# Patient Record
Sex: MALE | Race: WHITE | NOT HISPANIC OR LATINO | Employment: FULL TIME | URBAN - METROPOLITAN AREA
[De-identification: names, ages, dates, MRNs, and addresses within clinical notes are randomized per-mention and may not be internally consistent; named-entity substitution may affect disease eponyms.]

---

## 2018-07-30 ENCOUNTER — OFFICE VISIT (OUTPATIENT)
Dept: OBGYN CLINIC | Facility: HOSPITAL | Age: 48
End: 2018-07-30
Payer: COMMERCIAL

## 2018-07-30 VITALS
HEIGHT: 70 IN | HEART RATE: 64 BPM | BODY MASS INDEX: 28.06 KG/M2 | DIASTOLIC BLOOD PRESSURE: 81 MMHG | SYSTOLIC BLOOD PRESSURE: 128 MMHG | WEIGHT: 196 LBS

## 2018-07-30 DIAGNOSIS — M54.50 ACUTE MIDLINE LOW BACK PAIN WITHOUT SCIATICA: Primary | ICD-10-CM

## 2018-07-30 PROCEDURE — 99203 OFFICE O/P NEW LOW 30 MIN: CPT | Performed by: PHYSICIAN ASSISTANT
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
## 2018-07-30 NOTE — PATIENT INSTRUCTIONS
Call or follow up with any new or worsening symptoms as discussed  Ice Pack Application   WHAT YOU NEED TO KNOW:   Ice can be used to decrease swelling and pain after an injury or surgery  Common injuries that may benefit from ice therapy are sprains, strains, and bruises  The use of ice is most effective in the first 1 to 3 days after an injury  DISCHARGE INSTRUCTIONS:   How to apply ice:   · Fill a bag with crushed ice about half full  Remove the air from the bag before you close it  You can also use a bag of frozen vegetables  · Wrap the ice pack in a cloth to protect your skin from frostbite or other injury  · Put the ice over the injured area for 20 to 30 minutes or as long as directed  · Check your skin after about 30 seconds for color changes or blistering  Remove the ice if you notice skin changes or you feel burning or numbness in the area  · Throw the ice pack away after use  · Apply ice to your injured area 4 times each day or as directed  Ask your healthcare provider how many days you should apply ice  Contact your healthcare provider if:   · You see blisters, whitening of your skin, or a bluish color to your skin after using ice  · You feel burning or numbness when using ice  · You have questions about the use of ice packs  © 2017 [employer]  Information is for End User's use only and may not be sold, redistributed or otherwise used for commercial purposes  All illustrations and images included in CareNotes® are the copyrighted property of A [employer] M [employer]  or Brandon Herman  The above information is an  only  It is not intended as medical advice for individual conditions or treatments  Talk to your doctor, nurse or pharmacist before following any medical regimen to see if it is safe and effective for you                Safe Use of NSAIDs   WHAT YOU NEED TO KNOW:   NSAIDs are medicines that are used to decrease pain, swelling, and fever  NSAIDs are available with or without a doctor's order  NSAIDs that you can buy without a doctor's order include aspirin, ibuprofen, and naproxen  DISCHARGE INSTRUCTIONS:   Return to the emergency department if:   · You have swelling around your mouth or trouble breathing  · You are breathing fast or you have a fast heartbeat  · You have nausea, vomiting, or abdominal pain  · You have blood in your vomit or bowel movements  · You have a seizure  Contact your healthcare provider if:   · You have a headache or become confused  · You develop hearing loss or ringing in your ears  · You develop itching, a rash, or hives  · You have swelling around your lower legs, feet, ankles, and hands  · You do not know how much NSAIDs to give to your child  · You have questions or concerns about your condition or care  How to give NSAIDs to your child safely:   · Read the directions on the label  Find out if the medicine is right for your child's age and how much to give to your child  The dose for your child's weight or age should be listed  Do not  give your child more than the recommended amount  · Use the measuring tool that came with the medicine  Do not  use another measuring tool, such as a kitchen spoon  Other measuring tools do not provide the right amount of medicine  How to take NSAIDs safely:   · Read the directions on the label to learn how much medicine you should take and often to take it  Do not take more than the recommended amount  · Talk to your healthcare provider if you need take NSAIDs for more than 30 days  The longer you take NSAIDs, the higher your risk of side effects will be  You may need to take other medicines to decrease your risk of side effects such as stomach bleeding  · Do not take an over-the-counter NSAIDs with prescription NSAIDs  The combined amount of NSAIDs may be too high  · Tell your healthcare provider about other medicines you take  Some medicines can increase the risk of side effects from NSAIDs  Your healthcare provider will tell you if it is okay to take NSAIDs and how to take them  Who should not take NSAIDs:  Certain people should avoid or limit NSAIDs  Do not  give NSAIDs to children under 10months of age without direction from your child's doctor  Do not give aspirin to children under 25years of age  Your child could develop Reye syndrome if he takes aspirin  Reye syndrome can cause life-threatening brain and liver damage  Check your child's medicine labels for aspirin, salicylates, or oil of wintergreen  Talk to your healthcare provider before you take NSAIDs if any of the following apply to you:  · You have reflux disease, a peptic ulcer, H pylori infection, or bleeding in your stomach or intestines  · You have a bleeding disorder, or you take blood-thinning medicine  · You are allergic to aspirin or other NSAIDs  · You have liver or kidney or disease  · You have high blood pressure or heart disease  · You have 3 or more alcoholic drinks each day  · You are pregnant  What you need to know about an NSAID overdose:  Certain health problems can occur if you take too much NSAID medicine at one time or over time  Problems include nausea, vomiting, and abdominal pain  You may develop gastritis, peptic ulcers, and stomach bleeding  You may also develop fluid retention, heart problems, and kidney problems  NSAIDs can worsen high blood pressure  You may become confused, or you may have a headache, hearing loss, or hallucinations  An overdose of aspirin may also cause rapid breathing, a rapid heartbeat, or seizures  What to do if you think you or your child took too much NSAID medicine:  Call the [facility] at [phone] immediately  © 2017 [employer] Information is for End User's use only and may not be sold, redistributed or otherwise used for commercial purposes   All illustrations and images included in CareNotes® are the copyrighted property of [employer] Inc  or Brandon Herman  The above information is an  only  It is not intended as medical advice for individual conditions or treatments  Talk to your doctor, nurse or pharmacist before following any medical regimen to see if it is safe and effective for you

## 2018-08-02 ENCOUNTER — EVALUATION (OUTPATIENT)
Dept: PHYSICAL THERAPY | Age: 48
End: 2018-08-02
Payer: COMMERCIAL

## 2018-08-02 DIAGNOSIS — M54.50 ACUTE MIDLINE LOW BACK PAIN WITHOUT SCIATICA: Primary | ICD-10-CM

## 2018-08-02 PROCEDURE — 97112 NEUROMUSCULAR REEDUCATION: CPT | Performed by: PHYSICAL THERAPIST

## 2018-08-02 PROCEDURE — 97110 THERAPEUTIC EXERCISES: CPT | Performed by: PHYSICAL THERAPIST

## 2018-08-02 PROCEDURE — G8990 OTHER PT/OT CURRENT STATUS: HCPCS | Performed by: PHYSICAL THERAPIST

## 2018-08-02 PROCEDURE — 97161 PT EVAL LOW COMPLEX 20 MIN: CPT | Performed by: PHYSICAL THERAPIST

## 2018-08-02 PROCEDURE — G8991 OTHER PT/OT GOAL STATUS: HCPCS | Performed by: PHYSICAL THERAPIST

## 2018-08-02 NOTE — PROGRESS NOTES
PT Evaluation     Today's date: 2018  Patient name: Flash Nixon  : 1970  MRN: [id number]  Referring provider: Kathy Morris  Dx:   Encounter Diagnosis     ICD-10-CM    1  Acute midline low back pain without sciatica M54 5 Ambulatory referral to Physical Therapy                  Assessment/Plan    Flash Nixon is a 46 y o  male who was referred to physical therapy with complaint of LBP  Symptom report and clinical presentation consistent with hypermobility classification  Primary impairments include poor TVA/multifidus activation, decreased gluteal recruitment/strength, lumbar segmental and gross hypermobility, and report of constant pain that can exacerbate to 8/10 on NPRS  Consequently, patient reports increased difficulty with sit to stand transfers following long periods of inactivity and ability to ambulate first thing in the morning  He would benefit from skilled intervention to address all deficits and improve patient's tolerance to ADLs  He is a good candidate for therapy, as he is motivated to participate and reports no lower extremity referral of sx  Thank you for the referral and please do not hesitate to contact me with any questions or concerns regarding Angelo's care! Plan  2 x week for 6 weeks  Therapeutic exercise/activity, neuromuscular reeducation, manual therapy, and modalities  Patient understands and agrees to plan of care  Goals  Short Term--4 weeks  1  Good TVA/Multifidus activation  2   1/2 point increase in hip MMT  3  Patient will report 2 point decrease in pain levels  Long Term--By Discharge  1  Patient will report that pain is no longer constant  2   Patient will report <2 point increase in pain with morning ADLs  3   Patient will demonstrate 4+/5 hip abduction and 5/5 hip EXT strength with good gluteal activation  4   FOTO score will increase by 5 points       Subjective Evaluation    History of Present Illness  Date of onset: 2018  Mechanism of injury: Patient reports that he started a new job in January where he is required to stand for prolonged periods of time and some heavy lifting is involved as well   6 months ago, he noticed that he would experience pain in his lumbar spine for a day or two and then symptoms would fully resolve for a while  After about three months, he noticed that the pain was consistently occurring after his work shifts when he would be resting in the evening or when he first got up in the morning  Over the past month, pain has been constant throughout the entire day  He saw a specialist, who ordered radiographs that were unremarkable, and referred him to physical therapy to address muscular-related sx  Patient was also prescribed Mobic, which has provided significant relief  Symptoms  Constant midline pain in his lumbar spine and upper buttocks that he describes as a "steady ache "  Pain is a 4/10 at its best and 8/10 at its worst  He is unable to relate any specific activities or movements that provoke or exacerbate symptoms but does notice that pain is worse when he is inactive  He denies any referral into his lower extremities, numbness, paresthesia, or bowel/bladder issues related to the low back pain  Social Support  Steps to enter house: yes  Stairs in house: yes   Lives in: multiple-level home  Lives with: spouse and adult children    Employment status: working    Diagnostic Tests  X-ray: normal        Objective  GAIT: unremarkable  Squat assess: Patient able to squat to parallel, painfree, inc anterior tib translation    SLS: RLE: 30 sec, contralateral hip drop,   LLE: 30 sec, contralateral hip drop      Lumbar  % of normal   Flex  100   Extn  100   SB Left 100   SB Right 100   ROT Left 100   ROT Right 100   Repetitive testing: extension= no change    Flexion= no change      MMT         AROM          PROM    Hip       L       R        L           R      L     R   Flex   4+ 4+ WNL WNL WNL WNL   Extn  4 (inc HS actn) 4 (inc HS act)       Abd  4- 4-       Add  5 5       IR  5 5 WNL WNL WNL WNL   ER  4+ 4+ WNL WNL WNL WNL                 MMT    Knee         L        R   Flex  5 5   Extn  5 5                     MMT    Ankle       L        R   PF (heel raises) 15  15   DF  5 5   EHL 5 5   Inv  5 5   Ever   5 5     Palpation: No tenderness reported with PA spring testing or sidelying segmental mobility  Myotomes (L/R):intact  Dermatome: (pinprick- L/R):  intact       Slump test: L= neg    R=  neg     Straight leg raise:   L= neg     R=  neg            Transverse abdominis: Bent knee fall out= Poor supine march=Poor       Hip/SI joint:   scour= neg      haleigh = neg    capsular mobility=  normal      Active SLR=  neg               Innominate position= L AI        Flowsheet Rows      Most Recent Value   PT/OT G-Codes   Current Score  83   Projected Score  85   FOTO information reviewed  Yes   G code set  Other PT/OT Primary          Precautions: None    Daily Treatment Diary   8/2 patient education re: findings, POC, pathoanatomy  Manual  8/2            L AI correction performed                                                                    Exercise Diary  8/2            Transverse abd HEP; 10x10"            Clamshells; bilat 2x10; 5"                                                                                                                                                                                                                                                          Modalities

## 2018-08-09 ENCOUNTER — OFFICE VISIT (OUTPATIENT)
Dept: PHYSICAL THERAPY | Age: 48
End: 2018-08-09
Payer: COMMERCIAL

## 2018-08-09 DIAGNOSIS — M54.50 ACUTE MIDLINE LOW BACK PAIN WITHOUT SCIATICA: Primary | ICD-10-CM

## 2018-08-09 PROCEDURE — 97110 THERAPEUTIC EXERCISES: CPT | Performed by: PHYSICAL THERAPIST

## 2018-08-09 PROCEDURE — 97112 NEUROMUSCULAR REEDUCATION: CPT | Performed by: PHYSICAL THERAPIST

## 2018-08-09 NOTE — PROGRESS NOTES
Daily Note     Today's date: 2018  Patient name: Hilaria Evans  : 1970  MRN: [id number]  Referring provider: Jean Dixon  Dx:   Encounter Diagnosis     ICD-10-CM    1  Acute midline low back pain without sciatica M54 5                   Subjective: Increased fatigue in his lower back today following long shift at work  Objective: See treatment diary below      Assessment: patient tolerated first treatment well with no aggravation of sx or adverse side effects reported  He was challenged with progression of TVA isometrics today and cueing required in order to properly maintain contraction with addition of dynamic LE movements  Plan: Per POC with progression of stabilization exercises, as tolerated by patient       Daily Treatment Diary    patient education re: findings, POC, pathoanatomy  Manual                       L AI correction performed                                                                                                                           Exercise Diary                     Retro treadmill  4% incline 1 0 MPH           Lateral treadmill  0 5 MPH  3' R, L           Transverse abd HEP; 10x10"  10 x10"  DC home                   Clamshells; bilat 2x10; 5"  2x10 5"  DC home                    TVA w/ ball squeeze    20 x 5"                    TVA w/ hip ABD    20 x 5"; RTB                    TVA w/ bridge    20 x 5"                                                                                                                                                                                                                                                                                                                                                                                                 Modalities

## 2018-08-13 ENCOUNTER — OFFICE VISIT (OUTPATIENT)
Dept: PHYSICAL THERAPY | Age: 48
End: 2018-08-13
Payer: COMMERCIAL

## 2018-08-13 DIAGNOSIS — M54.50 ACUTE MIDLINE LOW BACK PAIN WITHOUT SCIATICA: Primary | ICD-10-CM

## 2018-08-13 PROCEDURE — 97110 THERAPEUTIC EXERCISES: CPT | Performed by: PHYSICAL THERAPIST

## 2018-08-13 PROCEDURE — 97112 NEUROMUSCULAR REEDUCATION: CPT | Performed by: PHYSICAL THERAPIST

## 2018-08-13 NOTE — PROGRESS NOTES
Daily Note     Today's date: 2018  Patient name: Claudio Holt  : 1970  MRN: [id number]  Referring provider: Amy Bhatt  Dx:   Encounter Diagnosis     ICD-10-CM    1  Acute midline low back pain without sciatica M54 5        Subjective: No adverse effects following last session  He reports that TVA isometrics are easier to control with HEP but still has difficulty with bridging exercise  Objective: See treatment diary below      Assessment: Patient able to progress TVA isometrics today with initiation of BKFO and marching  Significant difficulty demonstrated attempting to avoid excessive lumbopelvic movement with these exercises, but he was able to correct with cueing  Gluteal musculature also fatigued quickly with prone hip extension exercise today  Plan: Per POC with progression of stabilization exercises, as tolerated by patient       Daily Treatment Diary    patient education re: findings, POC, pathoanatomy  Manual                       L AI correction performed                                                                                                                           Exercise Diary                   Retro treadmill  4% incline 1 0 MPH 4% inc 1 0 MPH; 5'          Lateral treadmill  0 5 MPH  3' R, L 0 5MPH 3' R,L          Transverse abd HEP; 10x10"  10 x10"  DC home                   Clamshells; bilat 2x10; 5"  2x10 5"  DC home                    TVA w/ ball squeeze    20 x 5"                    TVA w/ hip ABD    20 x 5"; RTB                    TVA w/ bridge    20 x 5"  10 x 5"                  TVA w/ march      20x                  TVA w/ BKFO      20x                  prone hip ext w/ knee bent      15 x 5" R,L                                           fwd/retro monster walks  (length of gym)      2 trips with RTB                  lateral step (length of gym)      2 trips with RTB                                                                                                                                                                                                                                             Modalities

## 2018-08-15 ENCOUNTER — OFFICE VISIT (OUTPATIENT)
Dept: PHYSICAL THERAPY | Age: 48
End: 2018-08-15
Payer: COMMERCIAL

## 2018-08-15 DIAGNOSIS — M54.50 ACUTE MIDLINE LOW BACK PAIN WITHOUT SCIATICA: Primary | ICD-10-CM

## 2018-08-15 PROCEDURE — 97110 THERAPEUTIC EXERCISES: CPT | Performed by: PHYSICAL THERAPIST

## 2018-08-15 PROCEDURE — 97112 NEUROMUSCULAR REEDUCATION: CPT | Performed by: PHYSICAL THERAPIST

## 2018-08-15 NOTE — PROGRESS NOTES
Daily Note     Today's date: 8/15/2018  Patient name: Siria José  : 1970  MRN: [id number]  Referring provider: Bernardo Ruvalcaba PA-C  Dx:   No diagnosis found  Subjective: No adverse effects following last session  He reports minimal pain following today's work shift  Objective: See treatment diary below      Assessment: Patient challenged with addition of hip abduction to bridging exercise today and min cueing required to avoid excessive pelvic rotation  Cueing also provided during [address] to maintain upright trunk  Plan: Per POC with progression of stabilization exercises, as tolerated by patient       Daily Treatment Diary    patient education re: findings, POC, pathoanatomy  Manual                       L AI correction performed                                                                                                                           Exercise Diary  8/2  8/9  8/13  8/15               Retro treadmill  4% incline 1 0 MPH 4% inc 1 0 MPH; 5'     "         Lateral treadmill  0 5 MPH  3' R, L 0 5MPH 3' R,L     "         Transverse abd HEP; 10x10"  10 x10"  DC home                   Clamshells; bilat 2x10; 5"  2x10 5"  DC home                    TVA w/ ball squeeze    20 x 5"                    TVA w/ hip ABD    20 x 5"; RTB                    TVA w/ bridge    20 x 5"  10 x 5"  2 x10 5" with hip ABD GTB                TVA w/ march      20x                  TVA w/ BKFO      20x                  prone hip ext w/ knee bent      15 x 5" R,L                                           fwd/retro monster walks  (length of gym)      2 trips with RTB                  lateral step (length of gym)      2 trips with RTB                  3-way hip kicks        YTB; 3 x10 R,L                [employer]        20# 3 x 20 R,L                                                                                                                                                                                             Modalities

## 2018-08-20 ENCOUNTER — OFFICE VISIT (OUTPATIENT)
Dept: PHYSICAL THERAPY | Age: 48
End: 2018-08-20
Payer: COMMERCIAL

## 2018-08-20 DIAGNOSIS — M54.50 ACUTE MIDLINE LOW BACK PAIN WITHOUT SCIATICA: Primary | ICD-10-CM

## 2018-08-20 PROCEDURE — 97112 NEUROMUSCULAR REEDUCATION: CPT | Performed by: PHYSICAL THERAPIST

## 2018-08-20 NOTE — PROGRESS NOTES
Daily Note     Today's date: 2018  Patient name: Tahmina Ruiz  : 1970  MRN: [id number]  Referring provider: Fernando Sheldon  Dx:   Encounter Diagnosis     ICD-10-CM    1  Acute midline low back pain without sciatica M54 5        Subjective: Patient reports occasional "pinch" in his lower back but pain is significantly improved compared to initial evaluation  Objective: See treatment diary below      Assessment: Patient challenged with progression of TVA activities today  Dead bug exercise was modified to avoid excessive lumbar extension  Min cueing provided during quadruped hip ABD to avoid compensatory hip flexion  With this correction, excursion of motion significantly decreased  Plan: Per POC with progression of stabilization exercises, as tolerated by patient       Daily Treatment Diary    patient education re: findings, POC, pathoanatomy  Manual                       L AI correction performed                                                                                                                           Exercise Diary  8/2  8/9  8/13  8/15  8/20             Retro treadmill  4% incline 1 0 MPH 4% inc 1 0 MPH; 5'     "     "        Lateral treadmill  0 5 MPH  3' R, L 0 5MPH 3' R,L     "      "        Transverse abd HEP; 10x10"  10 x10"  DC home                   Clamshells; bilat 2x10; 5"  2x10 5"  DC home                    TVA w/ ball squeeze    20 x 5"                    TVA w/ hip ABD    20 x 5"; RTB                    TVA w/ bridge    20 x 5"  10 x 5"  2 x10 5" with hip ABD GTB  with march 20 x              TVA w/ march      20x                  TVA w/ BKFO      20x                  prone hip ext w/ knee bent      15 x 5" R,L                                           fwd/retro monster walks  (length of gym)      2 trips with RTB                  lateral step (length of gym)      2 trips with RTB                  3-way hip kicks        YTB; 3 x10 R,L              [employer]        20# 3 x 20 R,L  25# 3 x15 R,L              fire hydrants          15 x R, L 5"              mod dead bug          3 x 5 each                                                                                                                                           Modalities

## 2018-08-22 ENCOUNTER — OFFICE VISIT (OUTPATIENT)
Dept: PHYSICAL THERAPY | Age: 48
End: 2018-08-22
Payer: COMMERCIAL

## 2018-08-22 DIAGNOSIS — M54.50 ACUTE MIDLINE LOW BACK PAIN WITHOUT SCIATICA: Primary | ICD-10-CM

## 2018-08-22 PROCEDURE — 97112 NEUROMUSCULAR REEDUCATION: CPT | Performed by: PHYSICAL THERAPIST

## 2018-08-22 NOTE — PROGRESS NOTES
Daily Note     Today's date: 2018  Patient name: Hilaria Evans  : 1970  MRN: [id number]  Referring provider: Jean Dixon  Dx:   Encounter Diagnosis     ICD-10-CM    1  Acute midline low back pain without sciatica M54 5        Subjective: Patient reports increased discomfort in his R lumbar spine today that was worse after bending forward for an extended period of time  He felt as if he could not "straighten out" for a while after  Objective: See treatment diary below      Assessment: Patient continues to be challenged with progression of core stability exercises and required moderate cueing to avoid excessive pelvic movement with quadruped exercise today  SIJ correction performed today, and patient to monitor effect on workday tomrorow  Plan: Per POC with progression of stabilization exercises, as tolerated by patient       Daily Treatment Diary    patient education re: findings, POC, pathoanatomy  Manual       L AI correction performed            R upslip and L s/l gr 5 4-5 mob                                         Exercise Diary  8/2  8/9  8/13  8/15  8/20  8/22           Retro treadmill  4% incline 1 0 MPH 4% inc 1 0 MPH; 5'     "     "    "       Lateral treadmill  0 5 MPH  3' R, L 0 5MPH 3' R,L     "      "    "       Transverse abd HEP; 10x10"  10 x10"  DC home                   Clamshells; bilat 2x10; 5"  2x10 5"  DC home                    TVA w/ ball squeeze    20 x 5"                    TVA w/ hip ABD    20 x 5"; RTB                    TVA w/ bridge    20 x 5"  10 x 5"  2 x10 5" with hip ABD GTB  with  x  with march 20x            TVA w/ march      20x                  TVA w/ BKFO      20x                  prone hip ext w/ knee bent      15 x 5" R,L                                           fwd/retro monster walks  (length of gym)      2 trips with RTB      retro monster walks  4 trips            lateral step (length of gym)      2 trips with RTB                  3-way hip kicks        YTB; 3 x10 R,L                Pallof Press        20# 3 x 20 R,L  25# 3 x15 R,L  25# 3 x 15 on AIREX            fire hydrants          15 x R, L 5"              mod dead bug          3 x 5 each              quad alt LE            15x ea                                                                                                                 Modalities

## 2018-08-27 ENCOUNTER — OFFICE VISIT (OUTPATIENT)
Dept: PHYSICAL THERAPY | Age: 48
End: 2018-08-27
Payer: COMMERCIAL

## 2018-08-27 DIAGNOSIS — M54.50 ACUTE MIDLINE LOW BACK PAIN WITHOUT SCIATICA: Primary | ICD-10-CM

## 2018-08-27 PROCEDURE — 97112 NEUROMUSCULAR REEDUCATION: CPT | Performed by: PHYSICAL THERAPIST

## 2018-08-27 PROCEDURE — 97110 THERAPEUTIC EXERCISES: CPT | Performed by: PHYSICAL THERAPIST

## 2018-08-27 NOTE — PROGRESS NOTES
Daily Note     Today's date: 2018  Patient name: Ned Greenwood  : 1970  MRN: [id number]  Referring provider: Liyah Gillette  Dx:   Encounter Diagnosis     ICD-10-CM    1  Acute midline low back pain without sciatica M54 5        Subjective: Patient reports that he may continue to experience some discomfort when he lifts heavy objects at work but pain quickly resolves upon completion of the task  Objective: See treatment diary below      Assessment: Trap strengthening initiated today to address poor posture with heavy lifting tasks  Patient noted significant challenge with these exercises  Plan: Per POC with progression of stabilization exercises, as tolerated by patient       Daily Treatment Diary    patient education re: findings, POC, pathoanatomy  Manual       L AI correction performed            R upslip and L s/l gr 5 4-5 mob                                         Exercise Diary  8/2  8/9  8/13  8/15  8/20  8/22  8/27         Retro treadmill  4% incline 1 0 MPH 4% inc 1 0 MPH; 5'     "     "    "   "      Lateral treadmill  0 5 MPH  3' R, L 0 5MPH 3' R,L     "      "    "  3' YTB R,L      Transverse abd HEP; 10x10"  10 x10"  DC home                   Clamshells; bilat 2x10; 5"  2x10 5"  DC home                    TVA w/ ball squeeze    20 x 5"                    TVA w/ hip ABD    20 x 5"; RTB                    TVA w/ bridge    20 x 5"  10 x 5"  2 x10 5" with hip ABD GTB  with march 20 x  with march 20x            TVA w/ march      20x                  TVA w/ BKFO      20x                  prone hip ext w/ knee bent      15 x 5" R,L                                           fwd/retro monster walks  (length of gym)      2 trips with RTB      retro monster walks  4 trips            lateral step (length of gym)      2 trips with RTB                  3-way hip kicks        YTB; 3 x10 R,L                Pallof Press        20# 3 x 20 R,L  25# 3 x15 R,L  25# 3 x 15 on AIREX            fire hydrants          15 x R, L 5"              mod dead bug          3 x 5 each              quad alt LE            15x ea            Chops              20# 3 x 10 R,L          lifts               15# 3 x 10 R, L          Prone Is Ts              2x10 5"                                       Modalities

## 2018-08-29 ENCOUNTER — APPOINTMENT (OUTPATIENT)
Dept: PHYSICAL THERAPY | Age: 48
End: 2018-08-29
Payer: COMMERCIAL

## 2018-09-04 ENCOUNTER — OFFICE VISIT (OUTPATIENT)
Dept: PHYSICAL THERAPY | Age: 48
End: 2018-09-04
Payer: COMMERCIAL

## 2018-09-04 DIAGNOSIS — M54.50 ACUTE MIDLINE LOW BACK PAIN WITHOUT SCIATICA: Primary | ICD-10-CM

## 2018-09-04 PROCEDURE — G8991 OTHER PT/OT GOAL STATUS: HCPCS | Performed by: PHYSICAL THERAPIST

## 2018-09-04 PROCEDURE — 97112 NEUROMUSCULAR REEDUCATION: CPT

## 2018-09-04 PROCEDURE — G8990 OTHER PT/OT CURRENT STATUS: HCPCS | Performed by: PHYSICAL THERAPIST

## 2018-09-04 PROCEDURE — 97110 THERAPEUTIC EXERCISES: CPT

## 2018-09-04 NOTE — PROGRESS NOTES
Daily Note     Today's date: 2018  Patient name: Dana Yoon  : 1970  MRN: [id number]  Referring provider: Jany Garcia  Dx:   Encounter Diagnosis     ICD-10-CM    1  Acute midline low back pain without sciatica M54 5        Subjective: Patient reported pain is getting better but feels like coming here is a good reminder to perform his exercises  Objective: See treatment diary below      Assessment: Strengthening along with education on performing body mechanics  While lifting and performing every day tasks  Time from [facility]  [facility] 445-5pm    Plan: Per POC with progression of stabilization exercises, as tolerated by patient       Daily Treatment Diary    patient education re: findings, POC, pathoanatomy  Manual       L AI correction performed            R upslip and L s/l gr 5 4-5 mob                                         Exercise Diary  8/2  8/9  8/13  8/15  8/20  8/22  8/27  9       Retro treadmill  4% incline 1 0 MPH 4% inc 1 0 MPH; 5'     "     "    "   " 4 incline 1 0 6 min     Lateral treadmill  0 5 MPH  3' R, L 0 5MPH 3' R,L     "      "    "  3' YTB R,L 3 min each side      Transverse abd HEP; 10x10"  10 x10"  DC home            D/C       Clamshells; bilat 2x10; 5"  2x10 5"  DC home            D/C        TVA w/ ball squeeze    20 x 5"            d/C        TVA w/ hip ABD    20 x 5"; RTB                    TVA w/ bridge    20 x 5"  10 x 5"  2 x10 5" with hip ABD GTB  with  x  with march 20x            TVA w/ march      20x                  TVA w/ BKFO      20x                  prone hip ext w/ knee bent      15 x 5" R,L                                           fwd/retro monster walks  (length of gym)      2 trips with RTB      retro monster walks  4 trips            lateral step (length of gym)      2 trips with RTB                  3-way hip kicks        YTB; 3 x10 R,L                Pallof Press        20# 3 x 20 R,L  25# 3 x15 R,L  25# 3 x 15 on AIREX            fire hydrants          15 x R, L 5"              mod dead bug          3 x 5 each              quad alt LE            15x ea            Chops              20# 3 x 10 R,L  15# 3x10        lifts               15# 3 x 10 R, L  20# 3x10        Prone Is Ts              2x10 5"  2x10 5 "                                     Modalities

## 2018-09-12 ENCOUNTER — OFFICE VISIT (OUTPATIENT)
Dept: GASTROENTEROLOGY | Facility: CLINIC | Age: 48
End: 2018-09-12
Payer: COMMERCIAL

## 2018-09-12 ENCOUNTER — APPOINTMENT (OUTPATIENT)
Dept: PHYSICAL THERAPY | Age: 48
End: 2018-09-12
Payer: COMMERCIAL

## 2018-09-12 VITALS
HEART RATE: 82 BPM | HEIGHT: 70 IN | BODY MASS INDEX: 28.2 KG/M2 | DIASTOLIC BLOOD PRESSURE: 82 MMHG | SYSTOLIC BLOOD PRESSURE: 126 MMHG | WEIGHT: 197 LBS | TEMPERATURE: 98.3 F

## 2018-09-12 DIAGNOSIS — R10.13 DYSPEPSIA: Primary | ICD-10-CM

## 2018-09-12 DIAGNOSIS — Z12.11 COLON CANCER SCREENING: ICD-10-CM

## 2018-09-12 PROCEDURE — 99204 OFFICE O/P NEW MOD 45 MIN: CPT | Performed by: INTERNAL MEDICINE

## 2018-09-12 RX ORDER — SUCRALFATE 1 G/1
1 TABLET ORAL 4 TIMES DAILY PRN
Qty: 120 TABLET | Refills: 5 | Status: ON HOLD | OUTPATIENT
Start: 2018-09-12 | End: 2018-10-18 | Stop reason: ALTCHOICE

## 2018-09-12 RX ORDER — MELOXICAM 15 MG/1
15 TABLET ORAL DAILY
Refills: 1 | Status: ON HOLD | COMMUNITY
Start: 2018-08-17 | End: 2018-10-18 | Stop reason: ALTCHOICE

## 2018-09-12 NOTE — LETTER
September 12, 2018     Cris Conway MD  [address]  [place] [address]    Patient: Dana Yoon   YOB: 1970   Date of Visit: 9/12/2018       Dear Dr Pederson Comp:    Thank you for referring Nahomy Farnsworth to me for evaluation  Below are my notes for this consultation  If you have questions, please do not hesitate to call me  I look forward to following your patient along with you  Sincerely,        Mayra Delay, DO        CC: No Recipients  Mayra De Dios, DO  9/12/2018  1:33 PM  Sign at close encounter  [facility] - Outpatient Consultation  Dana Yoon 46 y o  male MRN: [id number]  Encounter: [id number]      ASSESSMENT AND PLAN:    46year old male referred by PCP  1  Dyspepsia- will do EGD given his age and it's new onset, trial of carafate    2  Colon cancer screening- do for colonoscopy so will arrange     3  Night sweats- recent CBC per pt showed mild leukopenia , will try to obtain records from Pt first, he needs a primary doctor, will have my office give recs for a PCP within [facility]     Follow up in a few months time      ______________________________________________________________________    HPI:  46year old male referred by PCP due to dyspepsia for the past few months  Happens in the am   Has the sensation of fullness and discomfort  Denies GERD, nausea, vomiting, dysphagia, or odynophagia, rectal bleeding  Denies family history of colon cancer  Mom had pancreatic cancer diagnosed in her late 63's  Does have some bouts of alternating constipation with then looser stools but denies any diarrhea  REVIEW OF SYSTEMS:    CONSTITUTIONAL: Denies any fever, chills, rigors, and weight loss  HEENT: No earache or tinnitus  Denies hearing loss or visual disturbances  CARDIOVASCULAR: No chest pain or palpitations  RESPIRATORY: Denies any cough, hemoptysis, shortness of breath or dyspnea on exertion    GASTROINTESTINAL: As noted in the History of Present Illness  GENITOURINARY: No problems with urination  Denies any hematuria or dysuria  NEUROLOGIC: No dizziness or vertigo, denies headaches  MUSCULOSKELETAL: Denies any muscle or joint pain  SKIN: Denies skin rashes or itching  ENDOCRINE: Denies excessive thirst  Denies intolerance to heat or cold  PSYCHOSOCIAL: Denies depression or anxiety  Denies any recent memory loss  Historical Information   Past Medical History:   Diagnosis Date    Arthritis      No past surgical history on file  Social History   History   Alcohol use Not on file     History   Drug use: Unknown     History   Smoking Status    Current Every Day Smoker   Smokeless Tobacco    Never Used     Comment: cigars     No family history on file  Meds/Allergies     Current Outpatient Prescriptions:     Cetirizine HCl (ZYRTEC ALLERGY) 10 MG CAPS    meloxicam (MOBIC) 15 mg tablet    No Known Allergies      Objective     Blood pressure 126/82, pulse 82, temperature 98 3 °F (36 8 °C), temperature source Tympanic, height 5' 10" (1 778 m), weight 89 4 kg (197 lb)  Body mass index is 28 27 kg/m²  PHYSICAL EXAM:      General Appearance:   Alert, cooperative, no distress   HEENT:   Normocephalic, atraumatic, anicteric      Neck:  Supple, symmetrical, trachea midline   Lungs:   Clear to auscultation bilaterally; no rales, rhonchi or wheezing; respirations unlabored    Heart[de-identified]   Regular rate and rhythm; no murmur, rub, or gallop  Abdomen:   Soft, non-tender, non-distended; normal bowel sounds; no masses, no organomegaly    Genitalia:   Deferred    Rectal:   Deferred    Extremities:  No cyanosis, clubbing or edema    Pulses:  2+ and symmetric    Skin:  No jaundice, rashes, or lesions    Lymph nodes:  No palpable cervical lymphadenopathy        Lab Results:   No visits with results within 1 Day(s) from this visit  Latest known visit with results is:   No results found for any previous visit         Radiology Results:   No results found

## 2018-09-12 NOTE — PROGRESS NOTES
[facility] - Outpatient Consultation  Heraclio Alcantara 46 y o  male MRN: [id number]  Encounter: [id number]      ASSESSMENT AND PLAN:    46year old male referred by PCP  1  Dyspepsia- will do EGD given his age and it's new onset, trial of carafate    2  Colon cancer screening- do for colonoscopy so will arrange     3  Night sweats- recent CBC per pt showed mild leukopenia , will try to obtain records from Pt first, he needs a primary doctor, will have my office give recs for a PCP within [facility]     Follow up in a few months time      ______________________________________________________________________    HPI:  46year old male referred by PCP due to dyspepsia for the past few months  Happens in the am   Has the sensation of fullness and discomfort  Denies GERD, nausea, vomiting, dysphagia, or odynophagia, rectal bleeding  Denies family history of colon cancer  Mom had pancreatic cancer diagnosed in her late 63's  Does have some bouts of alternating constipation with then looser stools but denies any diarrhea  REVIEW OF SYSTEMS:    CONSTITUTIONAL: Denies any fever, chills, rigors, and weight loss  HEENT: No earache or tinnitus  Denies hearing loss or visual disturbances  CARDIOVASCULAR: No chest pain or palpitations  RESPIRATORY: Denies any cough, hemoptysis, shortness of breath or dyspnea on exertion  GASTROINTESTINAL: As noted in the History of Present Illness  GENITOURINARY: No problems with urination  Denies any hematuria or dysuria  NEUROLOGIC: No dizziness or vertigo, denies headaches  MUSCULOSKELETAL: Denies any muscle or joint pain  SKIN: Denies skin rashes or itching  ENDOCRINE: Denies excessive thirst  Denies intolerance to heat or cold  PSYCHOSOCIAL: Denies depression or anxiety  Denies any recent memory loss         Historical Information   Past Medical History:   Diagnosis Date    Arthritis      No past surgical history on file   Social History   History   Alcohol use Not on file     History   Drug use: Unknown     History   Smoking Status    Current Every Day Smoker   Smokeless Tobacco    Never Used     Comment: cigars     No family history on file  Meds/Allergies     Current Outpatient Prescriptions:     Cetirizine HCl (ZYRTEC ALLERGY) 10 MG CAPS    meloxicam (MOBIC) 15 mg tablet    No Known Allergies      Objective     Blood pressure 126/82, pulse 82, temperature 98 3 °F (36 8 °C), temperature source Tympanic, height 5' 10" (1 778 m), weight 89 4 kg (197 lb)  Body mass index is 28 27 kg/m²  PHYSICAL EXAM:      General Appearance:   Alert, cooperative, no distress   HEENT:   Normocephalic, atraumatic, anicteric      Neck:  Supple, symmetrical, trachea midline   Lungs:   Clear to auscultation bilaterally; no rales, rhonchi or wheezing; respirations unlabored    Heart[de-identified]   Regular rate and rhythm; no murmur, rub, or gallop  Abdomen:   Soft, non-tender, non-distended; normal bowel sounds; no masses, no organomegaly    Genitalia:   Deferred    Rectal:   Deferred    Extremities:  No cyanosis, clubbing or edema    Pulses:  2+ and symmetric    Skin:  No jaundice, rashes, or lesions    Lymph nodes:  No palpable cervical lymphadenopathy        Lab Results:   No visits with results within 1 Day(s) from this visit  Latest known visit with results is:   No results found for any previous visit  Radiology Results:   No results found

## 2018-09-19 ENCOUNTER — APPOINTMENT (OUTPATIENT)
Dept: PHYSICAL THERAPY | Age: 48
End: 2018-09-19
Payer: COMMERCIAL

## 2018-09-26 ENCOUNTER — APPOINTMENT (OUTPATIENT)
Dept: PHYSICAL THERAPY | Age: 48
End: 2018-09-26
Payer: COMMERCIAL

## 2018-10-17 ENCOUNTER — ANESTHESIA EVENT (OUTPATIENT)
Dept: GASTROENTEROLOGY | Facility: MEDICAL CENTER | Age: 48
End: 2018-10-17
Payer: COMMERCIAL

## 2018-10-18 ENCOUNTER — HOSPITAL ENCOUNTER (OUTPATIENT)
Facility: MEDICAL CENTER | Age: 48
Setting detail: OUTPATIENT SURGERY
Discharge: HOME | End: 2018-10-18
Attending: INTERNAL MEDICINE | Admitting: INTERNAL MEDICINE
Payer: COMMERCIAL

## 2018-10-18 ENCOUNTER — ANESTHESIA (OUTPATIENT)
Dept: GASTROENTEROLOGY | Facility: MEDICAL CENTER | Age: 48
End: 2018-10-18
Payer: COMMERCIAL

## 2018-10-18 VITALS
TEMPERATURE: 98.2 F | DIASTOLIC BLOOD PRESSURE: 71 MMHG | RESPIRATION RATE: 16 BRPM | HEART RATE: 61 BPM | SYSTOLIC BLOOD PRESSURE: 111 MMHG | OXYGEN SATURATION: 95 % | WEIGHT: 195 LBS | BODY MASS INDEX: 27.92 KG/M2 | HEIGHT: 70 IN

## 2018-10-18 DIAGNOSIS — R10.13 DYSPEPSIA: ICD-10-CM

## 2018-10-18 DIAGNOSIS — Z12.11 COLON CANCER SCREENING: ICD-10-CM

## 2018-10-18 PROCEDURE — 88342 IMHCHEM/IMCYTCHM 1ST ANTB: CPT | Performed by: PATHOLOGY

## 2018-10-18 PROCEDURE — 88305 TISSUE EXAM BY PATHOLOGIST: CPT | Performed by: PATHOLOGY

## 2018-10-18 PROCEDURE — G0121 COLON CA SCRN NOT HI RSK IND: HCPCS | Performed by: INTERNAL MEDICINE

## 2018-10-18 PROCEDURE — 43239 EGD BIOPSY SINGLE/MULTIPLE: CPT | Performed by: INTERNAL MEDICINE

## 2018-10-18 RX ORDER — SODIUM CHLORIDE 9 MG/ML
125 INJECTION, SOLUTION INTRAVENOUS CONTINUOUS
Status: DISCONTINUED | OUTPATIENT
Start: 2018-10-18 | End: 2018-10-18 | Stop reason: HOSPADM

## 2018-10-18 RX ORDER — PROPOFOL 10 MG/ML
INJECTION, EMULSION INTRAVENOUS AS NEEDED
Status: DISCONTINUED | OUTPATIENT
Start: 2018-10-18 | End: 2018-10-18 | Stop reason: SURG

## 2018-10-18 RX ORDER — LIDOCAINE HYDROCHLORIDE 20 MG/ML
INJECTION, SOLUTION EPIDURAL; INFILTRATION; INTRACAUDAL; PERINEURAL AS NEEDED
Status: DISCONTINUED | OUTPATIENT
Start: 2018-10-18 | End: 2018-10-18 | Stop reason: SURG

## 2018-10-18 RX ADMIN — PROPOFOL 50 MG: 10 INJECTION, EMULSION INTRAVENOUS at 11:05

## 2018-10-18 RX ADMIN — PROPOFOL 30 MG: 10 INJECTION, EMULSION INTRAVENOUS at 11:21

## 2018-10-18 RX ADMIN — PROPOFOL 40 MG: 10 INJECTION, EMULSION INTRAVENOUS at 11:14

## 2018-10-18 RX ADMIN — SODIUM CHLORIDE: 0.9 INJECTION, SOLUTION INTRAVENOUS at 10:50

## 2018-10-18 RX ADMIN — PROPOFOL 50 MG: 10 INJECTION, EMULSION INTRAVENOUS at 11:06

## 2018-10-18 RX ADMIN — PROPOFOL 180 MG: 10 INJECTION, EMULSION INTRAVENOUS at 10:59

## 2018-10-18 RX ADMIN — LIDOCAINE HYDROCHLORIDE 3 ML: 20 INJECTION, SOLUTION EPIDURAL; INFILTRATION; INTRACAUDAL; PERINEURAL at 10:59

## 2018-10-18 RX ADMIN — PROPOFOL 50 MG: 10 INJECTION, EMULSION INTRAVENOUS at 11:09

## 2018-10-18 RX ADMIN — PROPOFOL 30 MG: 10 INJECTION, EMULSION INTRAVENOUS at 11:13

## 2018-10-18 RX ADMIN — PROPOFOL 30 MG: 10 INJECTION, EMULSION INTRAVENOUS at 11:18

## 2018-10-18 RX ADMIN — PROPOFOL 50 MG: 10 INJECTION, EMULSION INTRAVENOUS at 11:01

## 2018-10-18 NOTE — DISCHARGE INSTRUCTIONS
Upper Endoscopy   WHAT YOU NEED TO KNOW:   An upper endoscopy is also called an upper gastrointestinal (GI) endoscopy, or an esophagogastroduodenoscopy (EGD)  You may feel bloated, gassy, or have some abdominal discomfort after your procedure  Your throat may be sore for 24 to 36 hours  You may burp or pass gas from air that is still inside your body  DISCHARGE INSTRUCTIONS:   Call 911 for any of the following:   · You have sudden chest pain or trouble breathing  Seek care immediately if:   · You feel dizzy or faint  · You have trouble swallowing  · Your bowel movements are very dark or black  · Your abdomen is hard and firm and you have severe pain  · You vomit blood  Contact your healthcare provider if:   · You feel full or bloated and cannot burp or pass gas  · You have not had a bowel movement for 3 days after your procedure  · You have neck pain  · You have a fever or chills  · You have nausea or are vomiting  · You have a rash or hives  · You have questions or concerns about your endoscopy  Relieve a sore throat:  Suck on throat lozenges or crushed ice  Gargle with a small amount of warm salt water  Mix 1 teaspoon of salt and 1 cup of warm water to make salt water  Relieve gas and discomfort from bloating:  Lie on your right side with a heating pad on your abdomen  Take short walks to help pass gas  Eat small meals until bloating is relieved  Rest after your procedure: You have been given medicine to relax you  Do not  drive or make important decisions until the day after your procedure  Return to your normal activity as directed  You can usually return to work the day after your procedure  Follow up with your healthcare provider as directed:  Write down your questions so you remember to ask them during your visits     © 2017 [address] is for End User's use only and may not be sold, redistributed or otherwise used for commercial purposes  All illustrations and images included in CareNotes® are the copyrighted property of [employer]  or Brandon Herman  The above information is an  only  It is not intended as medical advice for individual conditions or treatments  Talk to your doctor, nurse or pharmacist before following any medical regimen to see if it is safe and effective for you  Colonoscopy   WHAT YOU NEED TO KNOW:   A colonoscopy is a procedure to examine the inside of your colon (intestine) with a scope  Polyps or tissue growths may have been removed during your colonoscopy  It is normal to feel bloated and to have some abdominal discomfort  You should be passing gas  If you have hemorrhoids or you had polyps removed, you may have a small amount of bleeding  DISCHARGE INSTRUCTIONS:   Seek care immediately if:   · You have a large amount of bright red blood in your bowel movements  · Your abdomen is hard and firm and you have severe pain  · You have sudden trouble breathing  Contact your healthcare provider if:   · You develop a rash or hives  · You have a fever within 24 hours of your procedure       · You have not had a bowel movement for 3 days after your procedure  · You have questions or concerns about your condition or care  Activity:   · Do not lift, strain, or run  for 3 days after your procedure  · Rest after your procedure  You have been given medicine to relax you  Do not  drive or make important decisions until the day after your procedure  Return to your normal activity as directed  · Relieve gas and discomfort from bloating  by lying on your right side with a heating pad on your abdomen  You may need to take short walks to help the gas move out  Eat small meals until bloating is relieved  If you had polyps removed: For 7 days after your procedure:  · Do not  take aspirin  · Do not  go on long car rides    Follow up with your healthcare provider as directed: Write down your questions so you remember to ask them during your visits  © 2017 [address] is for End User's use only and may not be sold, redistributed or otherwise used for commercial purposes  All illustrations and images included in CareNotes® are the copyrighted property of [employer]  or Brandon Herman  The above information is an  only  It is not intended as medical advice for individual conditions or treatments  Talk to your doctor, nurse or pharmacist before following any medical regimen to see if it is safe and effective for you

## 2018-10-18 NOTE — H&P
History and Physical - [facility]  Mary Abraham 46 y o  male MRN: [id number]                  HPI: Mary Abraham is a 46y o  year old male who presents for EGD due to dyspepsia and colonoscopy for colon cancer screening    REVIEW OF SYSTEMS: Per the HPI, and otherwise unremarkable  Historical Information   Past Medical History:   Diagnosis Date    Arthritis      History reviewed  No pertinent surgical history  Social History   History   Alcohol Use    Yes     Comment: socially     History   Drug Use No     History   Smoking Status    Current Every Day Smoker   Smokeless Tobacco    Never Used     Comment: cigars daily     History reviewed  No pertinent family history  Meds/Allergies     Prescriptions Prior to Admission   Medication    Cetirizine HCl (ZYRTEC ALLERGY) 10 MG CAPS    Na Sulfate-K Sulfate-Mg Sulf (SUPREP BOWEL PREP KIT) 17 5-3 13-1 6 GM/180ML SOLN       No Known Allergies    Objective     Blood pressure 139/70, pulse 60, temperature 98 2 °F (36 8 °C), temperature source Temporal, resp  rate 16, height 5' 10" (1 778 m), weight 88 5 kg (195 lb), SpO2 98 %  PHYSICAL EXAM    Gen: NAD  CV: RRR  CHEST: Clear  ABD: soft, NT/ND  EXT: no edema      ASSESSMENT/PLAN:  This is a 46y o  year old male here for EGD and colonoscopy, and he is stable and optimized for his procedure

## 2018-10-18 NOTE — DISCHARGE INSTR - AVS FIRST PAGE
OPERATIVE REPORT  PATIENT NAME: Johnny Dowd    :  1970  MRN: [id number]  Pt Location: [facility] GI ROOM 01    SURGERY DATE: 10/18/2018    Surgeon(s) and Role:     Merle Jean DO - Primary    Preop Diagnosis:  Dyspepsia [R10 13]  Colon cancer screening [Z12 11]    Post-Op Diagnosis Codes: * Dyspepsia [R10 13]     * Colon cancer screening [Z12 11]    Procedure(s) (LRB):  EGD AND COLONOSCOPY (N/A)    Specimen(s):  ID Type Source Tests Collected by Time Destination   1 : Duodenum r/o celiac Normal Tissue Duodenum TISSUE EXAM Saba Amaya, DO 10/18/2018 1102    2 : Stomach r/o h pylori Normal  Tissue Stomach TISSUE EXAM Sam Nielsen, DO 10/18/2018 1104        Estimated Blood Loss:   Minimal    Drains:       Anesthesia Type:   Choice    Operative Indications:  Dyspepsia [R10 13]  Colon cancer screening [Z12 11]      Operative Findings:    ESOPHAGOGASTRODUODENOSCOPY    PROCEDURE: EGD    SEDATION: Monitored anesthesia care, check anesthesia records    ASA Class: 2    INDICATIONS:     CONSENT:  Informed consent was obtained for the procedure, including sedation after explaining the risks and benefits of the procedure  Risks including but not limited to bleeding, perforation, infection, and missed lesion  PREPARATION:   Telemetry, pulse oximetry, blood pressure were monitored throughout the procedure  Patient was identified by myself both verbally and by visual inspection of ID band  DESCRIPTION:   Patient was placed in the left lateral decubitus position and was sedated with the above medication  The gastroscope was introduced in to the oropharynx and the esophagus was intubated under direct visualization  Scope was passed down the esophagus up to 2nd part of the duodenum  A careful inspection was made as the gastroscope was withdrawn, including a retroflexed view of the stomach; findings and interventions are described below  FINDINGS:    #1  Esophagus- normal esophagus    #2   Stomach- normal stomach on direct and retroflexed views, biopsied with cold biopsy forceps    #3  Duodenum- normal 1st and 2nd part of the duodenum, biopsied with cold biopsy forceps         IMPRESSIONS:      Normal EGD  Biopsied from the stomach and duodenum  RECOMMENDATIONS:     Await biopsy results  Proceed with colonoscopy now  COMPLICATIONS:  None; patient tolerated the procedure well  DISPOSITION: PACU           CONDITION: Stable      Colonoscopy Procedure Note    Procedure: Colonoscopy    Sedation: Monitored anesthesia care, check anesthesia records      ASA Class: 2    INDICATIONS:  Colon cancer screening    POST-OP DIAGNOSIS: See the impression below    Procedure Details     Prior colonoscopy: No prior colonoscopy  Informed consent was obtained for the procedure, including sedation  Risks of perforation, hemorrhage, adverse drug reaction and aspiration were discussed  The patient was placed in the left lateral decubitus position  Based on the pre-procedure assessment, including review of the patient's medical history, medications, allergies, and review of systems, he had been deemed to be an appropriate candidate for conscious sedation; he was therefore sedated with the medications listed below  The patient was monitored continuously with telemetry, pulse oximetry, blood pressure monitoring, and direct observations  A rectal examination was performed  The colonoscope was inserted into the rectum and advanced under direct vision to the cecum, which was identified by the ileocecal valve and appendiceal orifice  The quality of the colonic preparation was good  A careful inspection was made as the colonoscope was withdrawn, including a retroflexed view of the rectum; findings and interventions are described below  Findings:  Normal colonoscopy on direct and retroflexed views  Complications: None; patient tolerated the procedure well      Impression:    Normal colonoscopy on direct and retroflexed views  Recommendations:  Repeat colonoscopy in 10 years or sooner if clinically indicated                    SIGNATURE: Rajesh Chaves DO  DATE: October 18, 2018  TIME: 11:26 AM

## 2018-10-18 NOTE — OP NOTE
OPERATIVE REPORT  PATIENT NAME: Marisela Montgomery    :  1970  MRN: [id number]  Pt Location: [facility] GI ROOM 01    SURGERY DATE: 10/18/2018    Surgeon(s) and Role:     Farhad Magallanes DO - Primary    Preop Diagnosis:  Dyspepsia [R10 13]  Colon cancer screening [Z12 11]    Post-Op Diagnosis Codes: * Dyspepsia [R10 13]     * Colon cancer screening [Z12 11]    Procedure(s) (LRB):  EGD AND COLONOSCOPY (N/A)    Specimen(s):  ID Type Source Tests Collected by Time Destination   1 : Duodenum r/o celiac Normal Tissue Duodenum TISSUE EXAM Saba Amaya, DO 10/18/2018 1102    2 : Stomach r/o h pylori Normal  Tissue Stomach TISSUE EXAM Josy Rollins, DO 10/18/2018 1104        Estimated Blood Loss:   Minimal    Drains:       Anesthesia Type:   Choice    Operative Indications:  Dyspepsia [R10 13]  Colon cancer screening [Z12 11]      Operative Findings:    ESOPHAGOGASTRODUODENOSCOPY    PROCEDURE: EGD    SEDATION: Monitored anesthesia care, check anesthesia records    ASA Class: 2    INDICATIONS:     CONSENT:  Informed consent was obtained for the procedure, including sedation after explaining the risks and benefits of the procedure  Risks including but not limited to bleeding, perforation, infection, and missed lesion  PREPARATION:   Telemetry, pulse oximetry, blood pressure were monitored throughout the procedure  Patient was identified by myself both verbally and by visual inspection of ID band  DESCRIPTION:   Patient was placed in the left lateral decubitus position and was sedated with the above medication  The gastroscope was introduced in to the oropharynx and the esophagus was intubated under direct visualization  Scope was passed down the esophagus up to 2nd part of the duodenum  A careful inspection was made as the gastroscope was withdrawn, including a retroflexed view of the stomach; findings and interventions are described below  FINDINGS:    #1  Esophagus- normal esophagus    #2   Stomach- normal stomach on direct and retroflexed views, biopsied with cold biopsy forceps    #3  Duodenum- normal 1st and 2nd part of the duodenum, biopsied with cold biopsy forceps         IMPRESSIONS:      Normal EGD  Biopsied from the stomach and duodenum  RECOMMENDATIONS:     Await biopsy results  Proceed with colonoscopy now  COMPLICATIONS:  None; patient tolerated the procedure well  DISPOSITION: PACU           CONDITION: Stable      Colonoscopy Procedure Note    Procedure: Colonoscopy    Sedation: Monitored anesthesia care, check anesthesia records      ASA Class: 2    INDICATIONS:  Colon cancer screening    POST-OP DIAGNOSIS: See the impression below    Procedure Details     Prior colonoscopy: No prior colonoscopy  Informed consent was obtained for the procedure, including sedation  Risks of perforation, hemorrhage, adverse drug reaction and aspiration were discussed  The patient was placed in the left lateral decubitus position  Based on the pre-procedure assessment, including review of the patient's medical history, medications, allergies, and review of systems, he had been deemed to be an appropriate candidate for conscious sedation; he was therefore sedated with the medications listed below  The patient was monitored continuously with telemetry, pulse oximetry, blood pressure monitoring, and direct observations  A rectal examination was performed  The colonoscope was inserted into the rectum and advanced under direct vision to the cecum, which was identified by the ileocecal valve and appendiceal orifice  The quality of the colonic preparation was good  A careful inspection was made as the colonoscope was withdrawn, including a retroflexed view of the rectum; findings and interventions are described below  Findings:  Normal colonoscopy on direct and retroflexed views  Complications: None; patient tolerated the procedure well      Impression:    Normal colonoscopy on direct and retroflexed views  Recommendations:  Repeat colonoscopy in 10 years or sooner if clinically indicated                    SIGNATURE: Lavonne Anne DO  DATE: October 18, 2018  TIME: 11:26 AM

## 2018-10-18 NOTE — ANESTHESIA PREPROCEDURE EVALUATION
Review of Systems/Medical History  Patient summary reviewed        Cardiovascular  Negative cardio ROS Exercise tolerance (METS): >4,     Pulmonary  Smoker cigarette smoker  , Tobacco cessation counseling given ,        GI/Hepatic    Bowel prep       Negative  ROS        Endo/Other  Negative endo/other ROS      GYN       Hematology  Negative hematology ROS      Musculoskeletal    Arthritis     Neurology  Negative neurology ROS      Psychology   Negative psychology ROS              Physical Exam    Airway    Mallampati score: II  TM Distance: >3 FB  Neck ROM: full     Dental   No notable dental hx     Cardiovascular  Comment: Negative ROS, Rhythm: regular, Rate: normal,     Pulmonary  Breath sounds clear to auscultation,     Other Findings        Anesthesia Plan  ASA Score- 2     Anesthesia Type- IV sedation with anesthesia with ASA Monitors  Additional Monitors:   Airway Plan:         Plan Factors- Patient instructed to abstain from smoking on day of procedure  Patient smoked on day of surgery  Induction- intravenous  Postoperative Plan-     Informed Consent- Anesthetic plan and risks discussed with patient

## 2019-02-13 ENCOUNTER — OFFICE VISIT (OUTPATIENT)
Dept: GASTROENTEROLOGY | Facility: CLINIC | Age: 49
End: 2019-02-13
Payer: COMMERCIAL

## 2019-02-13 VITALS
HEART RATE: 76 BPM | SYSTOLIC BLOOD PRESSURE: 148 MMHG | WEIGHT: 202 LBS | BODY MASS INDEX: 28.92 KG/M2 | HEIGHT: 70 IN | DIASTOLIC BLOOD PRESSURE: 89 MMHG | TEMPERATURE: 98.6 F

## 2019-02-13 DIAGNOSIS — K59.04 CHRONIC IDIOPATHIC CONSTIPATION: Primary | ICD-10-CM

## 2019-02-13 DIAGNOSIS — R14.0 BLOATING: ICD-10-CM

## 2019-02-13 PROCEDURE — 99213 OFFICE O/P EST LOW 20 MIN: CPT | Performed by: PHYSICIAN ASSISTANT

## 2019-02-13 NOTE — PATIENT INSTRUCTIONS
1  Trial Citrucel (fiber supplement)- take daily per bottle instruction  2  Start Miralax - 1 capful daily; can decrease to every other day (or less frequently) or increase to twice a day  3  Maintain high fiber diet  4  Will check thyroid level and follow it up with you    High Fiber Diet   AMBULATORY CARE:   A high-fiber diet  includes foods that have a high amount of fiber  Fiber is the part of fruits, vegetables, and grains that is not broken down by your body  Fiber keeps your bowel movements regular  Fiber can also help lower your cholesterol level, control blood sugar in people with diabetes, and relieve constipation  Fiber can also help you control your weight because it helps you feel full faster  Most adults should eat 25 to 35 grams of fiber each day  Talk to your dietitian or healthcare provider about the amount of fiber you need  Good sources of fiber:   · Foods with at least 4 grams of fiber per serving:      ¨ ? to ½ cup of high-fiber cereal (check the nutrition label on the box)    ¨ ½ cup of blackberries or raspberries    ¨ 4 dried prunes    ¨ 1 cooked artichoke    ¨ ½ cup of cooked legumes, such as lentils, or red, kidney, and gunn beans    · Foods with 1 to 3 grams of fiber per serving:      ¨ 1 slice of whole-wheat, pumpernickel, or rye bread    ¨ ½ cup of cooked brown rice    ¨ 4 whole-wheat crackers    ¨ 1 cup of oatmeal    ¨ ½ cup of cereal with 1 to 3 grams of fiber per serving (check the nutrition label on the box)    ¨ 1 small piece of fruit, such as an apple, banana, pear, kiwi, or orange    ¨ 3 dates    ¨ ½ cup of canned apricots, fruit cocktail, peaches, or pears    ¨ ½ cup of raw or cooked vegetables, such as carrots, cauliflower, cabbage, spinach, squash, or corn  Ways that you can increase fiber in your diet:   · Choose brown or wild rice instead of white rice  · Use whole wheat flour in recipes instead of white or all-purpose flour       · Add beans and peas to casseroles or soups      · Choose fresh fruit and vegetables with peels or skins on instead of juices  Other diet guidelines to follow:   · Add fiber to your diet slowly  You may have abdominal discomfort, bloating, and gas if you add fiber to your diet too quickly  · Drink plenty of liquids as you add fiber to your diet  You may have nausea or develop constipation if you do not drink enough water  Ask how much liquid to drink each day and which liquids are best for you  © 2017 [employer] Information is for End User's use only and may not be sold, redistributed or otherwise used for commercial purposes  All illustrations and images included in CareNotes® are the copyrighted property of [employer]  or Brandon Herman  The above information is an  only  It is not intended as medical advice for individual conditions or treatments  Talk to your doctor, nurse or pharmacist before following any medical regimen to see if it is safe and effective for you

## 2019-02-25 ENCOUNTER — OFFICE VISIT (OUTPATIENT)
Dept: FAMILY MEDICINE CLINIC | Facility: CLINIC | Age: 49
End: 2019-02-25
Payer: COMMERCIAL

## 2019-02-25 VITALS
OXYGEN SATURATION: 99 % | SYSTOLIC BLOOD PRESSURE: 150 MMHG | DIASTOLIC BLOOD PRESSURE: 78 MMHG | RESPIRATION RATE: 16 BRPM | HEART RATE: 66 BPM | BODY MASS INDEX: 29.68 KG/M2 | HEIGHT: 69 IN | WEIGHT: 200.4 LBS

## 2019-02-25 DIAGNOSIS — K59.09 CHRONIC CONSTIPATION: Primary | Chronic | ICD-10-CM

## 2019-02-25 DIAGNOSIS — R53.83 FATIGUE, UNSPECIFIED TYPE: ICD-10-CM

## 2019-02-25 DIAGNOSIS — R14.0 BLOATING: ICD-10-CM

## 2019-02-25 DIAGNOSIS — Z13.1 SCREENING FOR DIABETES MELLITUS: ICD-10-CM

## 2019-02-25 DIAGNOSIS — Z12.5 SCREENING FOR PROSTATE CANCER: ICD-10-CM

## 2019-02-25 PROCEDURE — 99203 OFFICE O/P NEW LOW 30 MIN: CPT | Performed by: FAMILY MEDICINE

## 2019-02-25 PROCEDURE — 3008F BODY MASS INDEX DOCD: CPT | Performed by: FAMILY MEDICINE

## 2019-02-25 NOTE — PROGRESS NOTES
Assessment/Plan:    No problem-specific Assessment & Plan notes found for this encounter  Diagnoses and all orders for this visit:    Chronic constipation  Comments:  Chronic; stable  Followed by GI  Continue high-fiber diet, with good PO hydration; get back to regular exercise  TSH incl with FBW  Orders:  -     TSH, 3rd generation with Free T4 reflex; Future    Bloating    Fatigue, unspecified type  -     CBC and differential; Future  -     TSH, 3rd generation with Free T4 reflex; Future    Screening for diabetes mellitus  Comments:  FBW ordered for the pt  He was also urged to work on stopping smoking  Orders:  -     Lipid Panel with Direct LDL reflex; Future  -     Comprehensive metabolic panel; Future    Screening for prostate cancer  Comments:  PSA incl with FBW  Orders:  -     PSA, Total Screen; Future          Subjective:      Patient ID: Ji Pinedo is a 44 y o  male  Pt referred to our office by GI - pt has issues with constipation chronically  He bloats regularly  Pt just had colonoscopy / EGD in 10/2018  Pt notes only gaining about 5 lbs recently  No real hair loss  The following portions of the patient's history were reviewed and updated as appropriate: allergies, current medications, past family history, past social history, past surgical history and problem list     Past Medical History:   Diagnosis Date    Arthritis      Past Surgical History:   Procedure Laterality Date    COLONOSCOPY      [place] ESOPHAGOGASTRODUODENOSCOPY TRANSORAL DIAGNOSTIC N/A 10/18/2018    Procedure: EGD AND COLONOSCOPY;  Surgeon: Desean Oliver DO;  Location: [facility] GI LAB; Service: Gastroenterology       Current Outpatient Medications:     Cetirizine HCl (ZYRTEC ALLERGY) 10 MG CAPS, Take by mouth, Disp: , Rfl:     No Known Allergies    No family history on file      Social History     Tobacco Use    Smoking status: Light Tobacco Smoker     Packs/day: 0 25    Smokeless tobacco: Never Used   [place] Tobacco comment: cigars daily   Substance Use Topics    Alcohol use: Yes     Frequency: 2-4 times a month     Comment: socially    Drug use: No   ; 2 sons  Pt works in a [employer]  No regular exercise  Review of Systems   Constitutional: Negative for activity change and unexpected weight change  Respiratory: Negative for shortness of breath  Cardiovascular: Negative for chest pain  Gastrointestinal: Positive for constipation  Negative for blood in stool  Endocrine:        No hair loss  Genitourinary: Negative for dysuria and frequency  Stronger odor to his urine  Objective:      /78 (BP Location: Left arm, Patient Position: Sitting, Cuff Size: Standard)   Pulse 66   Resp 16   Ht 5' 9 09" (1 755 m)   Wt 90 9 kg (200 lb 6 4 oz)   SpO2 99%   BMI 29 51 kg/m²   Repeat BP = 146/82 RA seated  Physical Exam   Constitutional: He is oriented to person, place, and time  He appears well-developed and well-nourished  No distress  HENT:   Head: Normocephalic and atraumatic  Right Ear: Hearing, tympanic membrane, external ear and ear canal normal    Left Ear: Hearing, tympanic membrane, external ear and ear canal normal    Mouth/Throat: Oropharynx is clear and moist  No oropharyngeal exudate  Eyes: Conjunctivae are normal    Neck: Normal range of motion  Neck supple  No thyromegaly present  Cardiovascular: Normal rate, regular rhythm and normal heart sounds  Exam reveals no gallop and no friction rub  No murmur heard  Pulmonary/Chest: Effort normal and breath sounds normal  No stridor  No respiratory distress  He has no wheezes  He has no rales  Abdominal: Soft  Bowel sounds are normal  He exhibits no distension and no mass  There is no tenderness  There is no rebound and no guarding  Lymphadenopathy:     He has no cervical adenopathy  Neurological: He is alert and oriented to person, place, and time  Skin: He is not diaphoretic  Psychiatric: He has a normal mood and affect  His behavior is normal  Judgment and thought content normal    Nursing note and vitals reviewed

## 2019-05-22 ENCOUNTER — OFFICE VISIT (OUTPATIENT)
Dept: FAMILY MEDICINE CLINIC | Facility: CLINIC | Age: 49
End: 2019-05-22
Payer: COMMERCIAL

## 2019-05-22 ENCOUNTER — OFFICE VISIT (OUTPATIENT)
Dept: GASTROENTEROLOGY | Facility: CLINIC | Age: 49
End: 2019-05-22
Payer: COMMERCIAL

## 2019-05-22 VITALS
SYSTOLIC BLOOD PRESSURE: 130 MMHG | DIASTOLIC BLOOD PRESSURE: 80 MMHG | OXYGEN SATURATION: 96 % | HEIGHT: 69 IN | BODY MASS INDEX: 28.64 KG/M2 | HEART RATE: 91 BPM | WEIGHT: 193.4 LBS | TEMPERATURE: 98.3 F | RESPIRATION RATE: 15 BRPM

## 2019-05-22 VITALS
WEIGHT: 197.8 LBS | TEMPERATURE: 97.2 F | DIASTOLIC BLOOD PRESSURE: 82 MMHG | HEIGHT: 70 IN | SYSTOLIC BLOOD PRESSURE: 136 MMHG | HEART RATE: 66 BPM | BODY MASS INDEX: 28.32 KG/M2

## 2019-05-22 DIAGNOSIS — G89.29 CHRONIC BILATERAL LOW BACK PAIN WITHOUT SCIATICA: ICD-10-CM

## 2019-05-22 DIAGNOSIS — Z00.00 WELLNESS EXAMINATION: Primary | ICD-10-CM

## 2019-05-22 DIAGNOSIS — E78.5 HYPERLIPIDEMIA, UNSPECIFIED HYPERLIPIDEMIA TYPE: ICD-10-CM

## 2019-05-22 DIAGNOSIS — R10.9 ABDOMINAL DISCOMFORT: ICD-10-CM

## 2019-05-22 DIAGNOSIS — M54.50 CHRONIC BILATERAL LOW BACK PAIN WITHOUT SCIATICA: ICD-10-CM

## 2019-05-22 DIAGNOSIS — K58.1 IRRITABLE BOWEL SYNDROME WITH CONSTIPATION: ICD-10-CM

## 2019-05-22 DIAGNOSIS — K58.1 IRRITABLE BOWEL SYNDROME WITH CONSTIPATION: Primary | ICD-10-CM

## 2019-05-22 DIAGNOSIS — Z23 NEED FOR PROPHYLACTIC VACCINATION WITH COMBINED DIPHTHERIA-TETANUS-PERTUSSIS (DTP) VACCINE: ICD-10-CM

## 2019-05-22 PROCEDURE — 90715 TDAP VACCINE 7 YRS/> IM: CPT

## 2019-05-22 PROCEDURE — 99396 PREV VISIT EST AGE 40-64: CPT | Performed by: FAMILY MEDICINE

## 2019-05-22 PROCEDURE — 90471 IMMUNIZATION ADMIN: CPT

## 2019-05-22 PROCEDURE — 99213 OFFICE O/P EST LOW 20 MIN: CPT | Performed by: PHYSICIAN ASSISTANT

## 2019-05-22 RX ORDER — FLUTICASONE PROPIONATE 50 MCG
SPRAY, SUSPENSION (ML) NASAL AS NEEDED
COMMUNITY

## 2019-05-22 RX ORDER — NAPROXEN 500 MG/1
500 TABLET ORAL 2 TIMES DAILY WITH MEALS
Qty: 60 TABLET | Refills: 1 | Status: SHIPPED | OUTPATIENT
Start: 2019-05-22 | End: 2020-09-29

## 2019-06-25 DIAGNOSIS — R10.13 DYSPEPSIA: ICD-10-CM

## 2019-06-25 DIAGNOSIS — K58.1 IRRITABLE BOWEL SYNDROME WITH CONSTIPATION: ICD-10-CM

## 2019-06-25 DIAGNOSIS — R10.9 ABDOMINAL DISCOMFORT: Primary | ICD-10-CM

## 2019-06-25 DIAGNOSIS — R14.0 BLOATING: ICD-10-CM

## 2019-07-02 ENCOUNTER — TELEPHONE (OUTPATIENT)
Dept: GASTROENTEROLOGY | Facility: CLINIC | Age: 49
End: 2019-07-02

## 2019-07-02 NOTE — TELEPHONE ENCOUNTER
Abdomen Ct approved by the insurance  Spoke Lea S  Insurance rep  She couldn't give me an authorization number (computer difficulty)  Telephone reference # is [id number]  Authorization valid July 1/2019 till August 31/2019

## 2019-07-03 ENCOUNTER — HOSPITAL ENCOUNTER (OUTPATIENT)
Dept: RADIOLOGY | Facility: HOSPITAL | Age: 49
Discharge: HOME | End: 2019-07-03
Payer: COMMERCIAL

## 2019-07-03 DIAGNOSIS — K58.1 IRRITABLE BOWEL SYNDROME WITH CONSTIPATION: ICD-10-CM

## 2019-07-03 DIAGNOSIS — R10.13 DYSPEPSIA: ICD-10-CM

## 2019-07-03 DIAGNOSIS — R14.0 BLOATING: ICD-10-CM

## 2019-07-03 DIAGNOSIS — R10.9 ABDOMINAL DISCOMFORT: ICD-10-CM

## 2019-07-03 PROCEDURE — 74177 CT ABD & PELVIS W/CONTRAST: CPT

## 2019-07-03 RX ADMIN — IOHEXOL 100 ML: 350 INJECTION, SOLUTION INTRAVENOUS at 20:28

## 2019-07-08 ENCOUNTER — TELEPHONE (OUTPATIENT)
Dept: OTHER | Facility: HOSPITAL | Age: 49
End: 2019-07-08

## 2019-08-20 ENCOUNTER — OFFICE VISIT (OUTPATIENT)
Dept: GASTROENTEROLOGY | Facility: CLINIC | Age: 49
End: 2019-08-20
Payer: COMMERCIAL

## 2019-08-20 VITALS
WEIGHT: 198.8 LBS | HEART RATE: 58 BPM | DIASTOLIC BLOOD PRESSURE: 80 MMHG | TEMPERATURE: 97.8 F | BODY MASS INDEX: 29.44 KG/M2 | HEIGHT: 69 IN | SYSTOLIC BLOOD PRESSURE: 129 MMHG

## 2019-08-20 DIAGNOSIS — K58.1 IRRITABLE BOWEL SYNDROME WITH CONSTIPATION: Primary | ICD-10-CM

## 2019-08-20 PROCEDURE — 99213 OFFICE O/P EST LOW 20 MIN: CPT | Performed by: PHYSICIAN ASSISTANT

## 2019-08-20 NOTE — PROGRESS NOTES
[facility] - Outpatient Follow-up Note  Harley Livers 44 y o  male MRN: [id number]  Encounter: [id number]          ASSESSMENT AND PLAN:      1  Irritable bowel syndrome with constipation  Increase Citrucel to twice daily  Start higher dose Linzess 290 mcg daily 30 minutes before breakfast  He can also take MiraLAX with Linzess  Increase fluid intake and physical activity  Continue probiotics and could consider adding IBGard which is a peppermint oil supplement for gas and bloating (explained this is not FDA approved)  If these things are ineffective, would recommend switching to Amitiza twice daily  Follow-up with Dr Brown Anthony in 3 months  ______________________________________________________________________    SUBJECTIVE:  75-year-old male with history of irritable bowel syndrome with constipation presenting for office follow-up  He has been taking Citrucel daily in the morning  He also takes Linzess 145 mcg daily 30 minutes before breakfast   Unfortunately, he still complains of severe bloating and constipation  He has about 2 bowel movements per week  Sometimes he will go 1 full week without moving his bowels  His stool is generally hard  He occasionally has a sticking sensation in his epigastric region  The discomfort is mild  He complains of fullness after eating  He denies nausea or vomiting  He had a normal colonoscopy in 2018  Recent TSH within normal limits  REVIEW OF SYSTEMS IS OTHERWISE NEGATIVE  Historical Information   Past Medical History:   Diagnosis Date    Arthritis      Past Surgical History:   Procedure Laterality Date    COLONOSCOPY      [place] ESOPHAGOGASTRODUODENOSCOPY TRANSORAL DIAGNOSTIC N/A 10/18/2018    Procedure: EGD AND COLONOSCOPY;  Surgeon: Duke Martinez DO;  Location: [facility] GI LAB;   Service: Gastroenterology    UPPER GASTROINTESTINAL ENDOSCOPY       Social History   Social History     Substance and Sexual Activity   Alcohol Use Yes    Frequency: 2-4 times a month    Comment: socially     Social History     Substance and Sexual Activity   Drug Use No     Social History     Tobacco Use   Smoking Status Light Tobacco Smoker    Packs/day: 0 25   Smokeless Tobacco Never Used   Tobacco Comment    cigars daily     Family History   Problem Relation Age of Onset    Pancreatic cancer Mother     Hypertension Mother        Meds/Allergies       Current Outpatient Medications:     Cetirizine HCl (ZYRTEC ALLERGY) 10 MG CAPS    fluticasone (FLONASE ALLERGY RELIEF) 50 mcg/act nasal spray    Linaclotide (LINZESS) 145 MCG CAPS    naproxen (NAPROSYN) 500 mg tablet    No Known Allergies        Objective     Blood pressure 129/80, pulse 58, temperature 97 8 °F (36 6 °C), temperature source Tympanic, height 5' 9 09" (1 755 m), weight 90 2 kg (198 lb 12 8 oz)  Body mass index is 29 28 kg/m²  PHYSICAL EXAM:      General Appearance:   Alert, cooperative, no distress   HEENT:   Normocephalic, atraumatic, anicteric      Neck:  Supple, symmetrical, trachea midline   Lungs:   Clear to auscultation bilaterally    Heart[de-identified]   Regular rate and rhythm; no murmur, rub, or gallop  Abdomen:   Soft, non-tender, non-distended; normal bowel sounds    Genitalia:   Deferred    Rectal:   Deferred    Extremities:  No cyanosis, clubbing or edema    Pulses:  2+ and symmetric    Skin:  No jaundice, rashes, or lesions    Lymph nodes:  No palpable cervical lymphadenopathy        Lab Results:   No visits with results within 1 Day(s) from this visit     Latest known visit with results is:   Admission on 10/18/2018, Discharged on 10/18/2018   Component Date Value    Case Report 10/18/2018                      Value:Surgical Pathology Report                         Case: [id number]                                   Authorizing Provider:  Brinda Quinonez DO        Collected:           10/18/2018 1102              Ordering Location:     [facility]        Received: 10/18/2018 [address]  [facility]  Endoscopy                                                     Pathologist:           Jesus Dinh DO                                                            Specimens:   A) - Duodenum, Duodenum r/o celiac Normal                                                           B) - Stomach, Stomach r/o h pylori Normal                                                  Final Diagnosis 10/18/2018                      Value: This result contains rich text formatting which cannot be displayed here   Additional Information 10/18/2018                      Value: This result contains rich text formatting which cannot be displayed here  Gabriela Cole Gross Description 10/18/2018                      Value: This result contains rich text formatting which cannot be displayed here  Radiology Results:   No results found  Answers for HPI/ROS submitted by the patient on 8/19/2019   Abdominal pain  Chronicity: chronic  Onset: more than 1 year ago  Onset quality: undetermined  Frequency: daily  Episode duration: 1 hours  Progression since onset: unchanged  Pain location: epigastric region, periumbilical region  Pain - numeric: 1/10  Pain quality: dull, a sensation of fullness  Radiates to: does not radiate  anorexia: Yes  arthralgias: Yes  belching: No  constipation: Yes  diarrhea: Yes  dysuria: No  fever: No  flatus: No  frequency: No  headaches: Yes  hematochezia: No  hematuria: No  melena: No  myalgias: Yes  nausea:  No  weight loss: No  vomiting: No  Aggravated by: being still, certain positions  Relieved by: nothing  Diagnostic workup: CT scan, lower endoscopy, upper endoscopy

## 2019-08-20 NOTE — PATIENT INSTRUCTIONS
Increase Citrucel to twice daily  Take 2 capsules of Linzess daily 30 minutes before breakfast for 1-2 weeks  Increase fluids as much as possible  You can add MiraLAX to the Linzess if needed  Can try IBGard for bloating (contains peppermint oil)

## 2019-08-20 NOTE — LETTER
August 20, 2019     Shy Gan, [address]  [address] [place] [address]    Patient: Denise Bell   YOB: 1970   Date of Visit: 8/20/2019       Dear Dr Holly Huerta:    Thank you for referring Jose Cochran to me for evaluation  Below are my notes for this consultation  If you have questions, please do not hesitate to call me  I look forward to following your patient along with you  Sincerely,        Yessica Parker PA-C        CC: No Recipients  Yessica Parker PA-C  8/20/2019  8:38 AM  Sign at close encounter  [facility] - Outpatient Follow-up Note  Denise Bell 44 y o  male MRN: [id number]  Encounter: [id number]          ASSESSMENT AND PLAN:      1  Irritable bowel syndrome with constipation  Increase Citrucel to twice daily  Start higher dose Linzess 290 mcg daily 30 minutes before breakfast  He can also take MiraLAX with Linzess  Increase fluid intake and physical activity  Continue probiotics and could consider adding IBGard which is a peppermint oil supplement for gas and bloating (explained this is not FDA approved)  If these things are ineffective, would recommend switching to Amitiza twice daily  Follow-up with Dr Ned Stewart in 3 months  ______________________________________________________________________    SUBJECTIVE:  49-year-old male with history of irritable bowel syndrome with constipation presenting for office follow-up  He has been taking Citrucel daily in the morning  He also takes Linzess 145 mcg daily 30 minutes before breakfast   Unfortunately, he still complains of severe bloating and constipation  He has about 2 bowel movements per week  Sometimes he will go 1 full week without moving his bowels  His stool is generally hard  He occasionally has a sticking sensation in his epigastric region  The discomfort is mild  He complains of fullness after eating  He denies nausea or vomiting      He had a normal colonoscopy in 2018  Recent TSH within normal limits  REVIEW OF SYSTEMS IS OTHERWISE NEGATIVE  Historical Information   Past Medical History:   Diagnosis Date    Arthritis      Past Surgical History:   Procedure Laterality Date    COLONOSCOPY      CA ESOPHAGOGASTRODUODENOSCOPY TRANSORAL DIAGNOSTIC N/A 10/18/2018    Procedure: EGD AND COLONOSCOPY;  Surgeon: Angela Miller DO;  Location: [facility] GI LAB; Service: Gastroenterology    UPPER GASTROINTESTINAL ENDOSCOPY       Social History   Social History     Substance and Sexual Activity   Alcohol Use Yes    Frequency: 2-4 times a month    Comment: socially     Social History     Substance and Sexual Activity   Drug Use No     Social History     Tobacco Use   Smoking Status Light Tobacco Smoker    Packs/day: 0 25   Smokeless Tobacco Never Used   Tobacco Comment    cigars daily     Family History   Problem Relation Age of Onset    Pancreatic cancer Mother     Hypertension Mother        Meds/Allergies       Current Outpatient Medications:     Cetirizine HCl (ZYRTEC ALLERGY) 10 MG CAPS    fluticasone (FLONASE ALLERGY RELIEF) 50 mcg/act nasal spray    Linaclotide (LINZESS) 145 MCG CAPS    naproxen (NAPROSYN) 500 mg tablet    No Known Allergies        Objective     Blood pressure 129/80, pulse 58, temperature 97 8 °F (36 6 °C), temperature source Tympanic, height 5' 9 09" (1 755 m), weight 90 2 kg (198 lb 12 8 oz)  Body mass index is 29 28 kg/m²  PHYSICAL EXAM:      General Appearance:   Alert, cooperative, no distress   HEENT:   Normocephalic, atraumatic, anicteric      Neck:  Supple, symmetrical, trachea midline   Lungs:   Clear to auscultation bilaterally    Heart[de-identified]   Regular rate and rhythm; no murmur, rub, or gallop     Abdomen:   Soft, non-tender, non-distended; normal bowel sounds    Genitalia:   Deferred    Rectal:   Deferred    Extremities:  No cyanosis, clubbing or edema    Pulses:  2+ and symmetric    Skin:  No jaundice, rashes, or lesions    Lymph nodes:  No palpable cervical lymphadenopathy        Lab Results:   No visits with results within 1 Day(s) from this visit  Latest known visit with results is:   Admission on 10/18/2018, Discharged on 10/18/2018   Component Date Value    Case Report 10/18/2018                      Value:Surgical Pathology Report                         Case: [id number]                                   Authorizing Provider:  Nena Pineda DO        Collected:           10/18/2018 1102              Ordering Location:     [facility] End        Received:            10/18/2018 [address]  [facility]  Endoscopy                                                     Pathologist:           Noy Castillo DO                                                            Specimens:   A) - Duodenum, Duodenum r/o celiac Normal                                                           B) - Stomach, Stomach r/o h pylori Normal                                                  Final Diagnosis 10/18/2018                      Value: This result contains rich text formatting which cannot be displayed here   Additional Information 10/18/2018                      Value: This result contains rich text formatting which cannot be displayed here  [employer] Description 10/18/2018                      Value: This result contains rich text formatting which cannot be displayed here  Radiology Results:   No results found      Answers for HPI/ROS submitted by the patient on 8/19/2019   Abdominal pain  Chronicity: chronic  Onset: more than 1 year ago  Onset quality: undetermined  Frequency: daily  Episode duration: 1 hours  Progression since onset: unchanged  Pain location: epigastric region, periumbilical region  Pain - numeric: 1/10  Pain quality: dull, a sensation of fullness  Radiates to: does not radiate  anorexia: Yes  arthralgias: Yes  belching: No  constipation: Yes  diarrhea: Yes  dysuria: No  fever: No  flatus: No  frequency: No  headaches: Yes  hematochezia: No  hematuria: No  melena: No  myalgias: Yes  nausea:  No  weight loss: No  vomiting: No  Aggravated by: being still, certain positions  Relieved by: nothing  Diagnostic workup: CT scan, lower endoscopy, upper endoscopy

## 2019-09-23 ENCOUNTER — OFFICE VISIT (OUTPATIENT)
Dept: FAMILY MEDICINE CLINIC | Facility: CLINIC | Age: 49
End: 2019-09-23
Payer: COMMERCIAL

## 2019-09-23 VITALS
RESPIRATION RATE: 16 BRPM | SYSTOLIC BLOOD PRESSURE: 142 MMHG | BODY MASS INDEX: 28.14 KG/M2 | HEART RATE: 59 BPM | WEIGHT: 201 LBS | HEIGHT: 71 IN | DIASTOLIC BLOOD PRESSURE: 82 MMHG | OXYGEN SATURATION: 98 % | TEMPERATURE: 98.3 F

## 2019-09-23 DIAGNOSIS — Z12.5 SCREENING FOR PROSTATE CANCER: ICD-10-CM

## 2019-09-23 DIAGNOSIS — G89.29 CHRONIC BILATERAL LOW BACK PAIN WITHOUT SCIATICA: ICD-10-CM

## 2019-09-23 DIAGNOSIS — K58.1 IRRITABLE BOWEL SYNDROME WITH CONSTIPATION: ICD-10-CM

## 2019-09-23 DIAGNOSIS — M54.50 CHRONIC BILATERAL LOW BACK PAIN WITHOUT SCIATICA: ICD-10-CM

## 2019-09-23 DIAGNOSIS — L40.9 SCALP PSORIASIS: ICD-10-CM

## 2019-09-23 DIAGNOSIS — E78.2 MIXED HYPERLIPIDEMIA: Primary | ICD-10-CM

## 2019-09-23 PROCEDURE — 3008F BODY MASS INDEX DOCD: CPT | Performed by: FAMILY MEDICINE

## 2019-09-23 PROCEDURE — 99213 OFFICE O/P EST LOW 20 MIN: CPT | Performed by: FAMILY MEDICINE

## 2019-09-23 RX ORDER — CLOBETASOL PROPIONATE 0.46 MG/ML
SOLUTION TOPICAL DAILY
Qty: 50 ML | Refills: 5 | Status: SHIPPED | OUTPATIENT
Start: 2019-09-23 | End: 2020-11-27

## 2019-09-23 NOTE — PROGRESS NOTES
Assessment/Plan:    No problem-specific Assessment & Plan notes found for this encounter  Diagnoses and all orders for this visit:    Mixed hyperlipidemia  Comments:  Pt stable on exam - overall, this has improved  He is to focus on less fried food / read meat / fast food / butter / cheese in his diet, & regular exercise  Orders:  -     Comprehensive metabolic panel; Future  -     Lipid Panel with Direct LDL reflex; Future    Screening for prostate cancer  Comments:  Repeat FBW in 6 months - PSA will be incl with this  Orders:  -     PSA, Total Screen; Future    Irritable bowel syndrome with constipation  Comments:  Stable; under the care of GI - on Linzess  Scalp psoriasis  Comments: Will try Clobetasol scalp solution  Can use OTC HC Cream to spots on his body  Orders:  -     clobetasol (TEMOVATE) 0 05 % external solution; Apply topically daily    Chronic bilateral low back pain without sciatica  Comments:  Pt was wondering about referral to a back specialist -> had a name, but forgot at this time; he will get back to us with this  Subjective:      Patient ID: Bertha Mcbride is a 44 y o  male  Labs - Gluc 83, Cr/LFTs nml, HDL 77, LDL improved 151  Diet really has not changed; no regular exercise  Working with GI on his IBS - slight improvement with Linzess        The following portions of the patient's history were reviewed and updated as appropriate: allergies, current medications, past family history, past social history, past surgical history and problem list     Past Medical History:   Diagnosis Date    Arthritis        Current Outpatient Medications:     Cetirizine HCl (ZYRTEC ALLERGY) 10 MG CAPS, Take by mouth as needed , Disp: , Rfl:     fluticasone (FLONASE ALLERGY RELIEF) 50 mcg/act nasal spray, , Disp: , Rfl:     Linaclotide (LINZESS) 145 MCG CAPS, Take 1 capsule daily 30 minutes before breakfast , Disp: 90 capsule, Rfl: 2    clobetasol (TEMOVATE) 0 05 % external solution, Apply topically daily, Disp: 50 mL, Rfl: 5    naproxen (NAPROSYN) 500 mg tablet, Take 1 tablet (500 mg total) by mouth 2 (two) times a day with meals as needed  (Patient not taking: Reported on 9/23/2019), Disp: 60 tablet, Rfl: 1    No Known Allergies    Social History     Tobacco Use    Smoking status: Light Tobacco Smoker     Packs/day: 0 25    Smokeless tobacco: Never Used    Tobacco comment: cigars daily   Substance Use Topics    Alcohol use: Yes     Frequency: 2-4 times a month     Comment: socially    Drug use: No         Review of Systems   Constitutional: Negative for activity change  Respiratory: Negative for shortness of breath  Cardiovascular: Negative for chest pain  Gastrointestinal: Negative for abdominal pain  +Chronic bloating  Musculoskeletal: Positive for back pain  Pt with chronic low back pain  Objective:      /82   Pulse 59   Temp 98 3 °F (36 8 °C)   Resp 16   Ht 5' 10 95" (1 802 m)   Wt 91 2 kg (201 lb)   SpO2 98%   BMI 28 08 kg/m²          Physical Exam   Constitutional: He is oriented to person, place, and time  He appears well-developed and well-nourished  No distress  HENT:   Head: Normocephalic and atraumatic  Eyes: Conjunctivae are normal    Neck: Normal range of motion  Neck supple  No thyromegaly present  Cardiovascular: Normal rate, regular rhythm and normal heart sounds  Exam reveals no gallop and no friction rub  No murmur heard  Pulmonary/Chest: Effort normal and breath sounds normal  No stridor  No respiratory distress  He has no wheezes  He has no rales  Lymphadenopathy:     He has no cervical adenopathy  Neurological: He is alert and oriented to person, place, and time  Skin: He is not diaphoretic  Pt with small dry patches to the skin of his scalp and small spots on trunk and legs - possible psoriasis; no signs of secondary infection  Psychiatric: He has a normal mood and affect   His behavior is normal  Judgment and thought content normal    Nursing note and vitals reviewed

## 2019-10-07 DIAGNOSIS — K58.1 IRRITABLE BOWEL SYNDROME WITH CONSTIPATION: Primary | ICD-10-CM

## 2019-11-20 ENCOUNTER — OFFICE VISIT (OUTPATIENT)
Dept: GASTROENTEROLOGY | Facility: CLINIC | Age: 49
End: 2019-11-20
Payer: COMMERCIAL

## 2019-11-20 VITALS
HEIGHT: 71 IN | BODY MASS INDEX: 27.69 KG/M2 | WEIGHT: 197.8 LBS | HEART RATE: 58 BPM | TEMPERATURE: 97.4 F | SYSTOLIC BLOOD PRESSURE: 129 MMHG | DIASTOLIC BLOOD PRESSURE: 78 MMHG

## 2019-11-20 DIAGNOSIS — Z11.59 NEED FOR HEPATITIS C SCREENING TEST: ICD-10-CM

## 2019-11-20 DIAGNOSIS — K58.1 IRRITABLE BOWEL SYNDROME WITH CONSTIPATION: Primary | ICD-10-CM

## 2019-11-20 DIAGNOSIS — Z12.11 COLON CANCER SCREENING: ICD-10-CM

## 2019-11-20 PROCEDURE — 99214 OFFICE O/P EST MOD 30 MIN: CPT | Performed by: INTERNAL MEDICINE

## 2019-11-20 NOTE — PROGRESS NOTES
[facility] - Outpatient Follow-up Note  Trenton Hector 44 y o  male MRN: [id number]  Encounter: [id number]          ASSESSMENT AND PLAN:    Trenton Hector is a 44 y o  male here for a follow up regarding IBS with constipation  1  Irritable bowel syndrome with constipation:  Discontinue use of Linzess and begin taking Amitiza twice daily  continue taking Citrucel 30 minutes before breakfast     2  Colon cancer screening  -uptodate    3  Need for Hep C screening- will discuss at next visit and order    Follow up with PA in 3 months   ______________________________________________________________________    SUBJECTIVE:  Trenton Hector is a 44 y o  male here for a follow up regarding IBS with constipation  Patient was last seen in office on 8/20/19 by a PA, Mu Chacon where patient complained of severe bloating and constipation even with the use of Citrucel and Linzess 145 mcg daily before breakfast  Patient was only having about 0-2 BMs a week, with generally hard stool and a "sticking" sensation in his epigastric region  He also complained of fullness after eating  Patient had a normal colonoscopy in 2018, and his recent TSH was within normal limits at the time  Today patient states that he is still having a BM 0-2 times a week  He increased his dosage of Linzess, and believed it could be working as he was having more loose stools, however, it stopped working after some time and he went back to just having a BM 1-2 times a week  Patient states that he feels as if his stomach feels distended in the morning  He believes that constipation was an issue since he was a young adult, but now that he is older, he feels as if it has gotten worse  Patient states that he was taking MiraLax before but that was making his stool very loose  However, patient is still taking the Citrucel, which is helping his high cholesterol as well  Patient denies any heart burn or acid taste        REVIEW OF SYSTEMS IS OTHERWISE NEGATIVE  Historical Information   Past Medical History:   Diagnosis Date    Arthritis      Past Surgical History:   Procedure Laterality Date    COLONOSCOPY      [place] ESOPHAGOGASTRODUODENOSCOPY TRANSORAL DIAGNOSTIC N/A 10/18/2018    Procedure: EGD AND COLONOSCOPY;  Surgeon: Marvin Richardson DO;  Location: [facility] GI LAB; Service: Gastroenterology    UPPER GASTROINTESTINAL ENDOSCOPY       Social History   Social History     Substance and Sexual Activity   Alcohol Use Yes    Frequency: 2-4 times a month    Comment: socially     Social History     Substance and Sexual Activity   Drug Use No     Social History     Tobacco Use   Smoking Status Light Tobacco Smoker    Packs/day: 0 25   Smokeless Tobacco Never Used   Tobacco Comment    cigars daily     Family History   Problem Relation Age of Onset    Pancreatic cancer Mother     Hypertension Mother        Meds/Allergies       Current Outpatient Medications:     Cetirizine HCl (ZYRTEC ALLERGY) 10 MG CAPS    clobetasol (TEMOVATE) 0 05 % external solution    fluticasone (FLONASE ALLERGY RELIEF) 50 mcg/act nasal spray    linaCLOtide (LINZESS) 290 MCG CAPS    naproxen (NAPROSYN) 500 mg tablet    No Known Allergies        Objective     Blood pressure 129/78, pulse 58, temperature (!) 97 4 °F (36 3 °C), temperature source Tympanic, height 5' 10 95" (1 802 m), weight 89 7 kg (197 lb 12 8 oz)  Body mass index is 27 63 kg/m²  PHYSICAL EXAM:      General Appearance:   Alert, cooperative, no distress   HEENT:   Normocephalic, atraumatic, anicteric      Neck:  Supple, symmetrical, trachea midline   Lungs:   Clear to auscultation bilaterally; no rales, rhonchi or wheezing; respirations unlabored    Heart[de-identified]   Regular rate and rhythm; no murmur, rub, or gallop     Abdomen:   Soft, non-tender, non-distended; normal bowel sounds; no masses, no organomegaly    Genitalia:   Deferred    Rectal:   Deferred    Extremities:  No cyanosis, clubbing or edema    Pulses:  2+ and symmetric    Skin:  No jaundice, rashes, or lesions    Lymph nodes:  No palpable cervical lymphadenopathy        Lab Results:   No visits with results within 1 Day(s) from this visit  Latest known visit with results is:   Admission on 10/18/2018, Discharged on 10/18/2018   Component Date Value    Case Report 10/18/2018                      Value:Surgical Pathology Report                         Case: [id number]                                   Authorizing Provider:  Cynthia Latham DO        Collected:           10/18/2018 1102              Ordering Location:     [facility] End        Received:            10/18/2018 [address]  [facility]  Endoscopy                                                     Pathologist:           Linda Hawkins DO                                                            Specimens:   A) - Duodenum, Duodenum r/o celiac Normal                                                           B) - Stomach, Stomach r/o h pylori Normal                                                  Final Diagnosis 10/18/2018                      Value: This result contains rich text formatting which cannot be displayed here   Additional Information 10/18/2018                      Value: This result contains rich text formatting which cannot be displayed here  [employer] Gross Description 10/18/2018                      Value: This result contains rich text formatting which cannot be displayed here  Radiology Results:   No results found  Attestation:   By signing my name below, ILeonel, attest that this documentation has    been prepared under the direction and in the presence of GRAHAM Young  Electronically Signed: Eladia Smith  11/20/19        Cynthia URIZ, personally performed the services described in this    documentation   All medical record entries made by the scribjose were at my    direction and in my presence  I have reviewed the chart and discharge    instructions and agree that the record reflects my personal performance and is    accurate and complete   GRAHAM Whitfield  11/20/19

## 2019-12-11 ENCOUNTER — OFFICE VISIT (OUTPATIENT)
Dept: PODIATRY | Facility: CLINIC | Age: 49
End: 2019-12-11
Payer: COMMERCIAL

## 2019-12-11 VITALS
DIASTOLIC BLOOD PRESSURE: 75 MMHG | HEART RATE: 60 BPM | HEIGHT: 70 IN | SYSTOLIC BLOOD PRESSURE: 130 MMHG | WEIGHT: 197 LBS | BODY MASS INDEX: 28.2 KG/M2

## 2019-12-11 DIAGNOSIS — B35.3 TINEA PEDIS OF BOTH FEET: ICD-10-CM

## 2019-12-11 DIAGNOSIS — B35.1 ONYCHOMYCOSIS: Primary | ICD-10-CM

## 2019-12-11 DIAGNOSIS — L40.9 PSORIASIS: ICD-10-CM

## 2019-12-11 PROCEDURE — 99243 OFF/OP CNSLTJ NEW/EST LOW 30: CPT | Performed by: PODIATRIST

## 2019-12-11 RX ORDER — CLOTRIMAZOLE 1 %
CREAM (GRAM) TOPICAL 2 TIMES DAILY
Qty: 30 G | Refills: 0 | Status: SHIPPED | OUTPATIENT
Start: 2019-12-11 | End: 2021-06-02 | Stop reason: ALTCHOICE

## 2019-12-11 RX ORDER — TERBINAFINE HYDROCHLORIDE 250 MG/1
250 TABLET ORAL DAILY
Qty: 84 TABLET | Refills: 0 | Status: SHIPPED | OUTPATIENT
Start: 2019-12-11 | End: 2020-03-04

## 2019-12-11 NOTE — PROGRESS NOTES
Assessment/Plan:    Explained to patient that the differential for his rash on the soles of the feet includes chronic tinea pedis; psoriasis and eczema  His left great toenail appears mycotic  It is more cosmetically displeased and than painful  Reviewed recent lab work and liver enzymes were within normal limits  Discussed treatment options  Recommended 1st treating as chronic tinea  Patient was placed on Lamisil 250 mg orally for 84 days  This should treat both the tinea infection and the mycotic toenail  A prescription for clotrimazole cream was also given to the patient for b i d  Application  Patient was advised to contact [facility] dermatology for an appointment if he appears to be dealing with either psoriasis or eczema  No problem-specific Assessment & Plan notes found for this encounter  Diagnoses and all orders for this visit:    Onychomycosis  -     terbinafine (LamISIL) 250 mg tablet; Take 1 tablet (250 mg total) by mouth daily    Tinea pedis of both feet  -     clotrimazole (LOTRIMIN) 1 % cream; Apply topically 2 (two) times a day    Psoriasis          Subjective:      Patient ID: Lamine Giles is a 44 y o  male  HPI     Patient presents with concern regarding the soles of his feet  For at least 6 months he has been dealing with dry scaly skin on the bottom of each foot that is prone to cracking and fissuring  They are not fissured today but they remain dry and scaly  He relates a history of athlete's foot  On questioning, it is noted that there is patches of dry skin on his legs as well as his abdomen and scalp  He usually uses Temovate cream when these areas flare  He notes that he had a comprehensive metabolic profile performed in September results of which were within normal limits      The following portions of the patient's history were reviewed and updated as appropriate: allergies, current medications, past family history, past medical history, past social history, past surgical history and problem list     Review of Systems   Gastrointestinal: Negative  Musculoskeletal: Positive for back pain  Skin: Positive for rash  Objective:      /75   Pulse 60   Ht 5' 10" (1 778 m) Comment: verbal  Wt 89 4 kg (197 lb)   BMI 28 27 kg/m²          Physical Exam   Constitutional: He is oriented to person, place, and time  Cardiovascular: Regular rhythm and intact distal pulses  Musculoskeletal: Normal range of motion  He exhibits no tenderness or deformity  Neurological: He is alert and oriented to person, place, and time  No sensory deficit  He exhibits normal muscle tone  Skin: Rash noted  Soles of both feet dry and scaly  Healed fissures noted within each arch    Left hallux nail plate is thickened yellow with subungual debris

## 2020-01-09 ENCOUNTER — OFFICE VISIT (OUTPATIENT)
Dept: PODIATRY | Facility: CLINIC | Age: 50
End: 2020-01-09
Payer: COMMERCIAL

## 2020-01-09 VITALS
HEIGHT: 70 IN | HEART RATE: 63 BPM | SYSTOLIC BLOOD PRESSURE: 133 MMHG | BODY MASS INDEX: 28.92 KG/M2 | WEIGHT: 202 LBS | DIASTOLIC BLOOD PRESSURE: 77 MMHG

## 2020-01-09 DIAGNOSIS — B35.1 ONYCHOMYCOSIS: Primary | ICD-10-CM

## 2020-01-09 DIAGNOSIS — B35.3 TINEA PEDIS OF BOTH FEET: ICD-10-CM

## 2020-01-09 DIAGNOSIS — L40.9 PSORIASIS: ICD-10-CM

## 2020-01-09 PROCEDURE — 99213 OFFICE O/P EST LOW 20 MIN: CPT | Performed by: PODIATRIST

## 2020-01-09 RX ORDER — CLOTRIMAZOLE 1 %
CREAM (GRAM) TOPICAL 2 TIMES DAILY
Qty: 30 G | Refills: 0 | Status: SHIPPED | OUTPATIENT
Start: 2020-01-09 | End: 2020-02-10

## 2020-01-09 NOTE — PROGRESS NOTES
Assessment/Plan:    Patient advised to can please his Lamisil prescription  Another 30 g tube of clotrimazole for b i d  Application prescribed  Patient to call dermatologist for appointment as I am concerned that he may have psoriasis  He is rescheduled for 6 months  No problem-specific Assessment & Plan notes found for this encounter  Diagnoses and all orders for this visit:    Onychomycosis    Tinea pedis of both feet  -     clotrimazole (LOTRIMIN) 1 % cream; Apply topically 2 (two) times a day    Psoriasis          Subjective:      Patient ID: Braxton Reynaga is a 44 y o  male  HPI     Patient presents for follow-up an assessment  Improvement noted in the soles of both feet with decrease in scaling  Left great toenail even appears improved  Patient still has rash on chest and body  The following portions of the patient's history were reviewed and updated as appropriate: allergies, current medications, past family history, past medical history, past social history, past surgical history and problem list     Review of Systems   Constitutional: Negative  Respiratory: Negative  Cardiovascular: Negative  Gastrointestinal: Negative  Skin: Positive for rash  Objective:      /77   Pulse 63   Ht 5' 10" (1 778 m)   Wt 91 6 kg (202 lb)   BMI 28 98 kg/m²          Physical Exam   Cardiovascular: Regular rhythm and intact distal pulses  Musculoskeletal: Normal range of motion  He exhibits no deformity  Skin: Rash noted  Yellow scales on the plantar soles of both feet  Left great toenail thickened yellow with subungual debris distally

## 2020-01-17 ENCOUNTER — OFFICE VISIT (OUTPATIENT)
Dept: URGENT CARE | Age: 50
End: 2020-01-17
Payer: COMMERCIAL

## 2020-01-17 VITALS
HEART RATE: 102 BPM | OXYGEN SATURATION: 98 % | TEMPERATURE: 99.5 F | RESPIRATION RATE: 18 BRPM | DIASTOLIC BLOOD PRESSURE: 76 MMHG | SYSTOLIC BLOOD PRESSURE: 129 MMHG

## 2020-01-17 DIAGNOSIS — J11.1 INFLUENZA-LIKE ILLNESS: Primary | ICD-10-CM

## 2020-01-17 DIAGNOSIS — M67.442 GANGLION OF LEFT HAND: ICD-10-CM

## 2020-01-17 PROCEDURE — 99213 OFFICE O/P EST LOW 20 MIN: CPT | Performed by: FAMILY MEDICINE

## 2020-01-17 RX ORDER — OSELTAMIVIR PHOSPHATE 75 MG/1
75 CAPSULE ORAL EVERY 12 HOURS SCHEDULED
Qty: 10 CAPSULE | Refills: 0 | Status: SHIPPED | OUTPATIENT
Start: 2020-01-17 | End: 2020-01-22

## 2020-01-17 NOTE — PATIENT INSTRUCTIONS
Rest, limit activity  Tamiflu twice a day until finished (10 doses)  Tylenol, or ibuprofen (Advil/Motrin), or try Aleve (please take with food) as needed  Sinus medication as needed (Flonase nasal spray 1 spray in each nostril once or twice a day)  Recheck/follow-up with family physician and Orthopedics ([phone]) as discussed  Please go to the hospital emergency department if needed

## 2020-01-17 NOTE — PROGRESS NOTES
[facility]        NAME: Steph Gonzales is a 44 y o  male  : 1970    MRN: [id number]  DATE: 2020  TIME: 12:39 PM    Assessment and Plan   Influenza-like illness [R69]  1  Influenza-like illness  oseltamivir (TAMIFLU) 75 mg capsule   2  Ganglion of left hand           Patient Instructions     Patient Instructions   Rest, limit activity  Tamiflu twice a day until finished (10 doses)  Tylenol, or ibuprofen (Advil/Motrin), or try Aleve (please take with food) as needed  Sinus medication as needed (Flonase nasal spray 1 spray in each nostril once or twice a day)  Recheck/follow-up with family physician and Orthopedics ([phone]) as discussed  Please go to the hospital emergency department if needed  Follow up with PCP/Orthopedics in 3-5 days  Proceed to  ER if symptoms worsen  Chief Complaint     Chief Complaint   Patient presents with    Fever      and bodyaches this AM, denies cough , pressure between eyes    with use of motrin prn  History of Present Illness       Sudden onset of fever, body aches, and sinus pressure starting this morning; patient states he did not get the flu vaccine this year; patient also has a ganglion dorsal aspect of the left hand (patient is right-hand dominant)      Review of Systems   Review of Systems   Constitutional: Positive for fever  HENT: Positive for sinus pressure  Respiratory: Negative  Cardiovascular: Negative  Musculoskeletal: Positive for myalgias  Ganglion present dorsal aspect of the left hand   Skin: Negative            Current Medications       Current Outpatient Medications:     Cetirizine HCl (ZYRTEC ALLERGY) 10 MG CAPS, Take by mouth as needed , Disp: , Rfl:     clobetasol (TEMOVATE) 0 05 % external solution, Apply topically daily, Disp: 50 mL, Rfl: 5    clotrimazole (LOTRIMIN) 1 % cream, Apply topically 2 (two) times a day, Disp: 30 g, Rfl: 0    clotrimazole (LOTRIMIN) 1 % cream, Apply topically 2 (two) times a day, Disp: 30 g, Rfl: 0    fluticasone (FLONASE ALLERGY RELIEF) 50 mcg/act nasal spray, as needed , Disp: , Rfl:     linaCLOtide (LINZESS) 290 MCG CAPS, Take 1 capsule 30 minutes before breakfast daily  (Patient not taking: Reported on 12/11/2019), Disp: 90 capsule, Rfl: 2    lubiprostone (AMITIZA) 24 mcg capsule, Take 1 capsule (24 mcg total) by mouth 2 (two) times a day with meals, Disp: 60 capsule, Rfl: 3    naproxen (NAPROSYN) 500 mg tablet, Take 1 tablet (500 mg total) by mouth 2 (two) times a day with meals as needed  (Patient not taking: Reported on 1/9/2020), Disp: 60 tablet, Rfl: 1    oseltamivir (TAMIFLU) 75 mg capsule, Take 1 capsule (75 mg total) by mouth every 12 (twelve) hours for 10 doses, Disp: 10 capsule, Rfl: 0    terbinafine (LamISIL) 250 mg tablet, Take 1 tablet (250 mg total) by mouth daily, Disp: 84 tablet, Rfl: 0    Current Allergies     Allergies as of 01/17/2020    (No Known Allergies)            The following portions of the patient's history were reviewed and updated as appropriate: allergies, current medications, past family history, past medical history, past social history, past surgical history and problem list      Past Medical History:   Diagnosis Date    Arthritis     Constipation        Past Surgical History:   Procedure Laterality Date    COLONOSCOPY      TN ESOPHAGOGASTRODUODENOSCOPY TRANSORAL DIAGNOSTIC N/A 10/18/2018    Procedure: EGD AND COLONOSCOPY;  Surgeon: Marvin Richardson DO;  Location: [facility]; Service: Gastroenterology    UPPER GASTROINTESTINAL ENDOSCOPY         Family History   Problem Relation Age of Onset    Pancreatic cancer Mother     Hypertension Mother          Medications have been verified  Objective   /76   Pulse 102   Temp 99 5 °F (37 5 °C) (Temporal)   Resp 18   SpO2 98%        Physical Exam     Physical Exam   Constitutional: He is oriented to person, place, and time   He appears well-developed and well-nourished  HENT:   Right Ear: External ear normal    Left Ear: External ear normal    Discomfort over the ethmoid sinuses   Neck: Normal range of motion  Neck supple  Cardiovascular: Normal rate, regular rhythm and normal heart sounds  Pulmonary/Chest: Effort normal and breath sounds normal    Musculoskeletal:   Ganglion present dorsum of the left hand   Neurological: He is alert and oriented to person, place, and time  No nuchal rigidity   Skin:   Fair color and turgor   Psychiatric: He has a normal mood and affect  His behavior is normal    Nursing note and vitals reviewed

## 2020-02-09 DIAGNOSIS — B35.3 TINEA PEDIS OF BOTH FEET: ICD-10-CM

## 2020-02-10 RX ORDER — CLOTRIMAZOLE 1 %
CREAM (GRAM) TOPICAL
Qty: 30 G | Refills: 0 | Status: SHIPPED | OUTPATIENT
Start: 2020-02-10 | End: 2020-02-25 | Stop reason: CLARIF

## 2020-02-25 ENCOUNTER — OFFICE VISIT (OUTPATIENT)
Dept: GASTROENTEROLOGY | Facility: CLINIC | Age: 50
End: 2020-02-25
Payer: COMMERCIAL

## 2020-02-25 VITALS
DIASTOLIC BLOOD PRESSURE: 80 MMHG | BODY MASS INDEX: 28.92 KG/M2 | HEIGHT: 70 IN | SYSTOLIC BLOOD PRESSURE: 126 MMHG | HEART RATE: 60 BPM | TEMPERATURE: 97.8 F | WEIGHT: 202 LBS

## 2020-02-25 DIAGNOSIS — K59.04 CHRONIC IDIOPATHIC CONSTIPATION: ICD-10-CM

## 2020-02-25 DIAGNOSIS — K58.1 IRRITABLE BOWEL SYNDROME WITH CONSTIPATION: ICD-10-CM

## 2020-02-25 DIAGNOSIS — Z12.11 COLON CANCER SCREENING: Primary | ICD-10-CM

## 2020-02-25 DIAGNOSIS — R10.13 DYSPEPSIA: ICD-10-CM

## 2020-02-25 DIAGNOSIS — Z00.00 HEALTHCARE MAINTENANCE: ICD-10-CM

## 2020-02-25 DIAGNOSIS — Z11.59 NEED FOR HEPATITIS C SCREENING TEST: ICD-10-CM

## 2020-02-25 PROBLEM — R14.0 BLOATING: Status: RESOLVED | Noted: 2019-02-13 | Resolved: 2020-02-25

## 2020-02-25 PROCEDURE — 3008F BODY MASS INDEX DOCD: CPT | Performed by: INTERNAL MEDICINE

## 2020-02-25 PROCEDURE — 99214 OFFICE O/P EST MOD 30 MIN: CPT | Performed by: INTERNAL MEDICINE

## 2020-02-25 PROCEDURE — 4004F PT TOBACCO SCREEN RCVD TLK: CPT | Performed by: INTERNAL MEDICINE

## 2020-02-25 NOTE — PROGRESS NOTES
[facility] - Outpatient Follow-up Note  Khoi Hilario 44 y o  male MRN: [id number]  Encounter: [id number]          ASSESSMENT AND PLAN:  Mr Rashel Dos Santos was seen today for constipation  Diagnoses and all orders for this visit:    Colon cancer screening: He had normal colonoscopy in October 2018  He will be due for repeat screening in 2028  Need for hepatitis C screening test: I will order hepatitis C antibody, as he does not have documented screening  Healthcare maintenance:  I will also order rapid HIV 1/2 AB-AG combo, as he does not have documented HIV screening  Due to social alcohol consumption and age, I will check his liver enzymes to assess his liver health  Chronic idiopathic constipation: He continues Amitiza 24 mcg bid and Citrucel and states he is feeling better compared to our last visit in November 2019  Amitiza 24 mcg bid improves feeling that bowel movements are complete  He has 1 to several bowel movements per week  I will refill this for him today  He also continues Citrucel each morning  I recommended that he increase water intake and add Miralax as needed to improve frequency of bowel movements  Due to chronic constipation, I will order CBC to rule our anemia  Follow-up in 6 months    ______________________________________________________________________    SUBJECTIVE:  Khoi Hilario is a 44 y o  male with mixed HLD in the office today for management of constipation  I last saw Mr Rashel Dos Santos in the office 11/20/19  We discontinued Linzess and I prescribed Amitiza bid at that time  I recommended that he continue to take Citrucel 30 minutes before breakfast each morning  As of today, he continues Amitiza 24 mcg bid and Citrucel  He states Amitiza improves feeling that bowel movements are complete  He has 1 to several bowel movements per week  He has some continued bloating, but this has improved to the point that his appetite has improved   He had normal colonoscopy in October 2018  He is unaware of FHx of colon cancer  He reports that Dr Mcfarland Knows believes this Citrucel has helped improve his cholesterol as well  He smokes a cigar occasionally and drink alcohol socially on some weekends  REVIEW OF SYSTEMS IS OTHERWISE NEGATIVE  Historical Information   Past Medical History:   Diagnosis Date    Arthritis     Constipation      Past Surgical History:   Procedure Laterality Date    COLONOSCOPY      MD ESOPHAGOGASTRODUODENOSCOPY TRANSORAL DIAGNOSTIC N/A 10/18/2018    Procedure: EGD AND COLONOSCOPY;  Surgeon: Sharita Burns DO;  Location: [facility]; Service: Gastroenterology    UPPER GASTROINTESTINAL ENDOSCOPY       Social History   Social History     Substance and Sexual Activity   Alcohol Use Yes    Frequency: 2-4 times a month    Comment: socially     Social History     Substance and Sexual Activity   Drug Use No     Social History     Tobacco Use   Smoking Status Light Tobacco Smoker    Packs/day: 0 25   Smokeless Tobacco Never Used   Tobacco Comment    cigars daily     Family History   Problem Relation Age of Onset    Pancreatic cancer Mother     Hypertension Mother        Meds/Allergies       Current Outpatient Medications:     Cetirizine HCl (ZYRTEC ALLERGY) 10 MG CAPS    clobetasol (TEMOVATE) 0 05 % external solution    clotrimazole (LOTRIMIN) 1 % cream    fluticasone (FLONASE ALLERGY RELIEF) 50 mcg/act nasal spray    lubiprostone (AMITIZA) 24 mcg capsule    terbinafine (LamISIL) 250 mg tablet    lubiprostone (AMITIZA) 24 mcg capsule    naproxen (NAPROSYN) 500 mg tablet    No Known Allergies        Objective     Blood pressure 126/80, pulse 60, temperature 97 8 °F (36 6 °C), temperature source Tympanic, height 5' 10" (1 778 m), weight 91 6 kg (202 lb)  Body mass index is 28 98 kg/m²        PHYSICAL EXAM:      General Appearance:   Alert, cooperative, no distress   HEENT:   Normocephalic, atraumatic, anicteric      Neck:  Supple, symmetrical, trachea midline   Lungs:   Clear to auscultation bilaterally; no rales, rhonchi or wheezing; respirations unlabored    Heart[de-identified]   Regular rate and rhythm; no murmur, rub, or gallop  Abdomen:   Soft, non-tender, non-distended; normal bowel sounds; no masses, no organomegaly    Genitalia:   Deferred    Rectal:   Deferred    Extremities:  No cyanosis, clubbing or edema    Pulses:  2+ and symmetric    Skin:  No jaundice, rashes, or lesions    Lymph nodes:  No palpable cervical lymphadenopathy        Lab Results:     He does not have documented screening for hepatitis C or HIV  No visits with results within 1 Day(s) from this visit  Latest known visit with results is:   Admission on 10/18/2018, Discharged on 10/18/2018   Component Date Value    Case Report 10/18/2018                      Value:Surgical Pathology Report                         Case: [id number]                                   Authorizing Provider:  Kelechi Tran DO        Collected:           10/18/2018 1102              Ordering Location:     [facility]        Received:            10/18/2018 [address]  [facility]  Endoscopy                                                     Pathologist:           Aviva Coulter DO                                                            Specimens:   A) - Duodenum, Duodenum r/o celiac Normal                                                           B) - Stomach, Stomach r/o h pylori Normal                                                  Final Diagnosis 10/18/2018                      Value: This result contains rich text formatting which cannot be displayed here   Additional Information 10/18/2018                      Value: This result contains rich text formatting which cannot be displayed here  Lynne Mccormack Gross Description 10/18/2018                      Value: This result contains rich text formatting which cannot be displayed here  Radiology Results:   No results found  Attestation:   By signing my name below, Dhaval Ballesteros, attest that this documentation has been prepared under the direction and in the presence of GRAHAM Khan  Electronically Signed: Eladia Goel  2/25/2020       I, Azeem Velez, personally performed the services described in this documentation  All medical record entries made by the scribe were at my direction and in my presence  I have reviewed the chart and discharge instructions and agree that the record reflects my personal performance and is accurate and complete   GRAHAM Khan  2/25/2020

## 2020-03-20 LAB
EXTERNAL HIV SCREEN: NORMAL
HCV AB SER-ACNC: NEGATIVE

## 2020-03-30 ENCOUNTER — OFFICE VISIT (OUTPATIENT)
Dept: FAMILY MEDICINE CLINIC | Facility: CLINIC | Age: 50
End: 2020-03-30
Payer: COMMERCIAL

## 2020-03-30 VITALS
OXYGEN SATURATION: 98 % | HEIGHT: 69 IN | BODY MASS INDEX: 28.5 KG/M2 | RESPIRATION RATE: 18 BRPM | HEART RATE: 95 BPM | WEIGHT: 192.4 LBS | DIASTOLIC BLOOD PRESSURE: 78 MMHG | SYSTOLIC BLOOD PRESSURE: 142 MMHG | TEMPERATURE: 99 F

## 2020-03-30 DIAGNOSIS — E78.00 PURE HYPERCHOLESTEROLEMIA: ICD-10-CM

## 2020-03-30 DIAGNOSIS — Z13.1 SCREENING FOR DIABETES MELLITUS: ICD-10-CM

## 2020-03-30 DIAGNOSIS — R03.0 ELEVATED BP WITHOUT DIAGNOSIS OF HYPERTENSION: ICD-10-CM

## 2020-03-30 DIAGNOSIS — Z13.6 SCREENING FOR CARDIOVASCULAR CONDITION: ICD-10-CM

## 2020-03-30 DIAGNOSIS — K58.1 IRRITABLE BOWEL SYNDROME WITH CONSTIPATION: ICD-10-CM

## 2020-03-30 DIAGNOSIS — Z00.00 ANNUAL PHYSICAL EXAM: Primary | ICD-10-CM

## 2020-03-30 PROCEDURE — 99396 PREV VISIT EST AGE 40-64: CPT | Performed by: FAMILY MEDICINE

## 2020-03-30 PROCEDURE — 3008F BODY MASS INDEX DOCD: CPT | Performed by: DERMATOLOGY

## 2020-03-30 NOTE — PATIENT INSTRUCTIONS

## 2020-03-30 NOTE — PROGRESS NOTES
[facility]    NAME: Lamine Giles  AGE: 44 y o  SEX: male  : 1970     DATE: 3/30/2020     Assessment and Plan:     Problem List Items Addressed This Visit        Digestive    Irritable bowel syndrome with constipation      Other Visit Diagnoses     Annual physical exam    -  Primary    Darius Ty is healthy on exam   He is to continue to focus on less carbs (also salt/saturated fats) in his diet, to continue to get regular exercise  Screening for diabetes mellitus        Relevant Orders    Comprehensive metabolic panel    Screening for cardiovascular condition        Relevant Orders    Lipid Panel with Direct LDL reflex    Pure hypercholesterolemia        Mild -> diet and exercise as above; HDL is excellent  Repeat FBW prior to next OV  Relevant Orders    Lipid Panel with Direct LDL reflex    Elevated BP without diagnosis of hypertension        Diet and exercise as above -> we venus reassess at f/u (see how he responds to dietary/lifestyle mods)  Immunizations and preventive care screenings were discussed with patient today  Appropriate education was printed on patient's after visit summary  Counseling:  Dental Health: discussed importance of regular tooth brushing, flossing, and dental visits  · Exercise: the importance of regular exercise/physical activity was discussed  Recommend exercise 3-5 times per week for at least 30 minutes  Return in 6 months (on 2020) for Recheck  Chief Complaint:     Chief Complaint   Patient presents with    Annual Exam     Patient here for annual wellness exam     Labs Only     Patient here to review labs       History of Present Illness:     Adult Annual Physical   Patient here for a comprehensive physical exam  The patient reports no problems  IBS is under control with Amitiza; under the care of GI    Labs for GI - mildly leukopenic (reports being told that in the past) at 2 6 - reports that he does not get sick  Labs for us - PSA 0 60 (normal / stable), HDL 80,  (improved), Gluc 98, Cr/LFTs normal   Started exercising  Trying to eat healthier  Had Colonoscopy in 2018  Has seen a Podiatrist   Had Adacel in 2019  Diet and Physical Activity  · Diet/Nutrition: well balanced diet  · Exercise: 3-4 times a week on average  Depression Screening  PHQ-9 Depression Screening    PHQ-9:    Frequency of the following problems over the past two weeks:            General Health  · Sleep: sleeps well  · Hearing: normal - bilateral   · Vision: no vision problems  · Dental: no dental visits for >1 year  We discussed   Health  · Symptoms include: none     Review of Systems:     Review of Systems   Constitutional: Negative for activity change  Respiratory: Negative for shortness of breath  Cardiovascular: Negative for chest pain  Gastrointestinal: Negative for abdominal pain and blood in stool  Genitourinary: Negative for decreased urine volume and difficulty urinating  Psychiatric/Behavioral: Negative for dysphoric mood  The patient is not nervous/anxious  Past Medical History:     Past Medical History:   Diagnosis Date    Arthritis     Constipation       Past Surgical History:     Past Surgical History:   Procedure Laterality Date    COLONOSCOPY      AR ESOPHAGOGASTRODUODENOSCOPY TRANSORAL DIAGNOSTIC N/A 10/18/2018    Procedure: EGD AND COLONOSCOPY;  Surgeon: Gilmar Montgomery DO;  Location: [facility];   Service: Gastroenterology    UPPER GASTROINTESTINAL ENDOSCOPY        Family History:     Family History   Problem Relation Age of Onset    Pancreatic cancer Mother     Hypertension Mother       Social History:        Social History     Socioeconomic History    Marital status: /Civil Union     Spouse name: None    Number of children: None    Years of education: None    Highest education level: None   Occupational History    None   Social Needs    Financial resource strain: None    Food insecurity:     Worry: None     Inability: None    Transportation needs:     Medical: None     Non-medical: None   Tobacco Use    Smoking status: Light Tobacco Smoker     Packs/day: 0 25    Smokeless tobacco: Never Used    Tobacco comment: cigars daily   Substance and Sexual Activity    Alcohol use: Yes     Frequency: 2-4 times a month     Comment: socially    Drug use: No    Sexual activity: None   Lifestyle    Physical activity:     Days per week: None     Minutes per session: None    Stress: None   Relationships    Social connections:     Talks on phone: None     Gets together: None     Attends Anabaptism service: None     Active member of club or organization: None     Attends meetings of clubs or organizations: None     Relationship status: None    Intimate partner violence:     Fear of current or ex partner: None     Emotionally abused: None     Physically abused: None     Forced sexual activity: None   Other Topics Concern    None   Social History Narrative    None      Current Medications:     Current Outpatient Medications   Medication Sig Dispense Refill    Cetirizine HCl (ZYRTEC ALLERGY) 10 MG CAPS Take by mouth as needed       clobetasol (TEMOVATE) 0 05 % external solution Apply topically daily 50 mL 5    clotrimazole (LOTRIMIN) 1 % cream Apply topically 2 (two) times a day 30 g 0    fluticasone (FLONASE ALLERGY RELIEF) 50 mcg/act nasal spray as needed       lubiprostone (AMITIZA) 24 mcg capsule Take 1 capsule (24 mcg total) by mouth 2 (two) times a day with meals 60 capsule 3    naproxen (NAPROSYN) 500 mg tablet Take 1 tablet (500 mg total) by mouth 2 (two) times a day with meals as needed  (Patient not taking: Reported on 1/9/2020) 60 tablet 1     No current facility-administered medications for this visit         Allergies:     No Known Allergies   Physical Exam:     /78 (BP Location: Left arm, Patient Position: Sitting, Cuff Size: Standard)   Pulse 95   Temp 99 °F (37 2 °C)   Resp 18   Ht 5' 9 13" (1 756 m)   Wt 87 3 kg (192 lb 6 4 oz)   SpO2 98%   BMI 28 30 kg/m²     Physical Exam   Constitutional: He is oriented to person, place, and time  He appears well-developed and well-nourished  No distress  HENT:   Head: Normocephalic and atraumatic  Eyes: Conjunctivae are normal    Neck: Normal range of motion  Neck supple  No thyromegaly present  Cardiovascular: Normal rate, regular rhythm and normal heart sounds  Exam reveals no gallop and no friction rub  No murmur heard  Pulmonary/Chest: Effort normal and breath sounds normal  No stridor  No respiratory distress  He has no wheezes  He has no rales  Abdominal: Soft  Bowel sounds are normal  He exhibits no distension and no mass  There is no tenderness  There is no rebound and no guarding  Genitourinary: Rectum normal and prostate normal    Genitourinary Comments: No gross blood on the examining finger  Lymphadenopathy:     He has no cervical adenopathy  Neurological: He is alert and oriented to person, place, and time  Skin: He is not diaphoretic  Psychiatric: He has a normal mood and affect  His behavior is normal  Judgment and thought content normal    Nursing note and vitals reviewed        Gr[employer] Simms, [facility]

## 2020-05-04 ENCOUNTER — TELEMEDICINE (OUTPATIENT)
Dept: DERMATOLOGY | Facility: CLINIC | Age: 50
End: 2020-05-04
Payer: COMMERCIAL

## 2020-05-04 DIAGNOSIS — L40.9 PSORIASIS: Primary | ICD-10-CM

## 2020-05-04 PROCEDURE — 99243 OFF/OP CNSLTJ NEW/EST LOW 30: CPT | Performed by: DERMATOLOGY

## 2020-05-04 RX ORDER — CALCIPOTRIENE 50 UG/G
OINTMENT TOPICAL 2 TIMES DAILY
Qty: 60 G | Refills: 6 | Status: SHIPPED | OUTPATIENT
Start: 2020-05-04 | End: 2022-03-18 | Stop reason: CLARIF

## 2020-05-08 ENCOUNTER — APPOINTMENT (OUTPATIENT)
Dept: RADIOLOGY | Age: 50
End: 2020-05-08
Payer: COMMERCIAL

## 2020-05-08 DIAGNOSIS — L40.9 PSORIASIS: ICD-10-CM

## 2020-05-08 PROCEDURE — 72110 X-RAY EXAM L-2 SPINE 4/>VWS: CPT

## 2020-05-08 PROCEDURE — 73030 X-RAY EXAM OF SHOULDER: CPT

## 2020-09-24 ENCOUNTER — TELEPHONE (OUTPATIENT)
Dept: GASTROENTEROLOGY | Facility: CLINIC | Age: 50
End: 2020-09-24

## 2020-09-29 ENCOUNTER — OFFICE VISIT (OUTPATIENT)
Dept: FAMILY MEDICINE CLINIC | Facility: CLINIC | Age: 50
End: 2020-09-29
Payer: COMMERCIAL

## 2020-09-29 VITALS
HEIGHT: 70 IN | DIASTOLIC BLOOD PRESSURE: 80 MMHG | HEART RATE: 68 BPM | TEMPERATURE: 97.8 F | SYSTOLIC BLOOD PRESSURE: 140 MMHG | WEIGHT: 194.8 LBS | OXYGEN SATURATION: 98 % | BODY MASS INDEX: 27.89 KG/M2

## 2020-09-29 DIAGNOSIS — I10 ESSENTIAL HYPERTENSION: ICD-10-CM

## 2020-09-29 DIAGNOSIS — E78.2 MIXED HYPERLIPIDEMIA: Primary | ICD-10-CM

## 2020-09-29 PROCEDURE — 3079F DIAST BP 80-89 MM HG: CPT | Performed by: FAMILY MEDICINE

## 2020-09-29 PROCEDURE — 99214 OFFICE O/P EST MOD 30 MIN: CPT | Performed by: FAMILY MEDICINE

## 2020-09-29 RX ORDER — LISINOPRIL 10 MG/1
10 TABLET ORAL DAILY
Qty: 90 TABLET | Refills: 1 | Status: SHIPPED | OUTPATIENT
Start: 2020-09-29 | End: 2021-04-05

## 2020-09-29 RX ORDER — ROSUVASTATIN CALCIUM 10 MG/1
10 TABLET, COATED ORAL DAILY
Qty: 30 TABLET | Refills: 1 | Status: SHIPPED | OUTPATIENT
Start: 2020-09-29 | End: 2020-10-21

## 2020-09-29 NOTE — PROGRESS NOTES
Assessment/Plan:    No problem-specific Assessment & Plan notes found for this encounter  Diagnoses and all orders for this visit:    Mixed hyperlipidemia  Comments:  Anna Simental is stable on exam  He is to work on a lower carb/salt/saturated fat diet, & continue exercise  Start Rosuvastatin  Repeat FBW prior to next OV  Orders:  -     rosuvastatin (CRESTOR) 10 MG tablet; Take 1 tablet (10 mg total) by mouth daily  -     Comprehensive metabolic panel; Future  -     Lipid Panel with Direct LDL reflex; Future    Essential hypertension  Comments:  BP needs to be addressesd as well - start low dose Lisinopril  Disc potential R/SE of all meds  Pt urged to stop all tobacco intake (cigars)  Orders:  -     lisinopril (ZESTRIL) 10 mg tablet; Take 1 tablet (10 mg total) by mouth daily          Subjective:      Patient ID: Ji Pinedo is a 44 y o  male  Anna Simental is in f/u today  Did see Dermatology via TeleVideo Visit  Labs reviewed - Gluc 75, Cr/LFTs nml, TChol 247, Trig 57, HDL 81,  (ASCVD Score of 8 5%; pt smokes a cigar per day)  Overall, his diet is pretty good  He has been exercising  Works out with a friend of his          The following portions of the patient's history were reviewed and updated as appropriate: allergies, current medications, past family history, past social history, past surgical history and problem list     Past Medical History:   Diagnosis Date    Arthritis     Constipation      Current Outpatient Medications   Medication Instructions    calcipotriene (DOVONOX) 0 005 % ointment Topical, 2 times daily, Saturday and Sunday    Cetirizine HCl (ZYRTEC ALLERGY) 10 MG CAPS Oral, As needed    clobetasol (TEMOVATE) 0 05 % external solution Topical, Daily    clotrimazole (LOTRIMIN) 1 % cream Topical, 2 times daily    fluticasone (FLONASE ALLERGY RELIEF) 50 mcg/act nasal spray As needed    lisinopril (ZESTRIL) 10 mg, Oral, Daily    lubiprostone (AMITIZA) 24 mcg, Oral, 2 times daily with meals    rosuvastatin (CRESTOR) 10 mg, Oral, Daily    triamcinolone (KENALOG) 0 1 % ointment Topical, 2 times daily, Monday thru Friday     No Known Allergies    Social History     Tobacco Use    Smoking status: Light Tobacco Smoker     Packs/day: 0 25    Smokeless tobacco: Never Used    Tobacco comment: cigars daily   Substance Use Topics    Alcohol use: Yes     Frequency: 2-4 times a month     Comment: socially    Drug use: No         Review of Systems   Constitutional: Negative for activity change  Eyes: Negative for visual disturbance  Respiratory: Negative for shortness of breath  Cardiovascular: Negative for chest pain  Neurological: Negative for headaches  Objective:      /80   Pulse 68   Temp 97 8 °F (36 6 °C) (Skin)   Ht 5' 10" (1 778 m)   Wt 88 4 kg (194 lb 12 8 oz)   SpO2 98%   BMI 27 95 kg/m²   Repeat BP at 142/78 LA seated  Physical Exam  Vitals signs and nursing note reviewed  Constitutional:       General: He is not in acute distress  Appearance: Normal appearance  He is not ill-appearing, toxic-appearing or diaphoretic  HENT:      Head: Normocephalic and atraumatic  Eyes:      General: No scleral icterus  Conjunctiva/sclera: Conjunctivae normal    Neck:      Musculoskeletal: Normal range of motion and neck supple  No neck rigidity or muscular tenderness  Cardiovascular:      Rate and Rhythm: Normal rate and regular rhythm  Heart sounds: Normal heart sounds  No murmur  No friction rub  No gallop  Pulmonary:      Effort: Pulmonary effort is normal  No respiratory distress  Breath sounds: Normal breath sounds  No stridor  No wheezing, rhonchi or rales  Lymphadenopathy:      Cervical: No cervical adenopathy  Neurological:      Mental Status: He is alert and oriented to person, place, and time  Psychiatric:         Mood and Affect: Mood normal          Behavior: Behavior normal          Thought Content:  Thought content normal          Judgment: Judgment normal

## 2020-10-16 ENCOUNTER — TELEPHONE (OUTPATIENT)
Dept: FAMILY MEDICINE CLINIC | Facility: CLINIC | Age: 50
End: 2020-10-16

## 2020-10-21 DIAGNOSIS — E78.2 MIXED HYPERLIPIDEMIA: ICD-10-CM

## 2020-10-21 RX ORDER — ROSUVASTATIN CALCIUM 10 MG/1
TABLET, COATED ORAL
Qty: 30 TABLET | Refills: 1 | Status: SHIPPED | OUTPATIENT
Start: 2020-10-21 | End: 2020-11-20

## 2020-11-04 ENCOUNTER — TELEPHONE (OUTPATIENT)
Dept: DERMATOLOGY | Facility: CLINIC | Age: 50
End: 2020-11-04

## 2020-11-16 ENCOUNTER — TELEPHONE (OUTPATIENT)
Dept: DERMATOLOGY | Facility: CLINIC | Age: 50
End: 2020-11-16

## 2020-11-16 ENCOUNTER — OFFICE VISIT (OUTPATIENT)
Dept: DERMATOLOGY | Facility: CLINIC | Age: 50
End: 2020-11-16
Payer: COMMERCIAL

## 2020-11-16 DIAGNOSIS — L40.9 PSORIASIS: Primary | ICD-10-CM

## 2020-11-16 PROCEDURE — 96910 PHOTCHMTX TAR&UVB/PTRLTM&UVB: CPT | Performed by: DERMATOLOGY

## 2020-11-18 ENCOUNTER — OFFICE VISIT (OUTPATIENT)
Dept: DERMATOLOGY | Facility: CLINIC | Age: 50
End: 2020-11-18
Payer: COMMERCIAL

## 2020-11-18 DIAGNOSIS — L40.9 PSORIASIS: Primary | ICD-10-CM

## 2020-11-18 PROCEDURE — 96900 ACTINOTHERAPY UV LIGHT: CPT | Performed by: DERMATOLOGY

## 2020-11-20 ENCOUNTER — OFFICE VISIT (OUTPATIENT)
Dept: DERMATOLOGY | Facility: CLINIC | Age: 50
End: 2020-11-20
Payer: COMMERCIAL

## 2020-11-20 DIAGNOSIS — E78.2 MIXED HYPERLIPIDEMIA: ICD-10-CM

## 2020-11-20 DIAGNOSIS — L40.9 PSORIASIS: Primary | ICD-10-CM

## 2020-11-20 PROCEDURE — 96910 PHOTCHMTX TAR&UVB/PTRLTM&UVB: CPT | Performed by: DERMATOLOGY

## 2020-11-20 RX ORDER — ROSUVASTATIN CALCIUM 10 MG/1
TABLET, COATED ORAL
Qty: 30 TABLET | Refills: 1 | Status: SHIPPED | OUTPATIENT
Start: 2020-11-20 | End: 2020-12-04

## 2020-11-23 ENCOUNTER — OFFICE VISIT (OUTPATIENT)
Dept: DERMATOLOGY | Facility: CLINIC | Age: 50
End: 2020-11-23
Payer: COMMERCIAL

## 2020-11-23 DIAGNOSIS — L40.9 PSORIASIS: Primary | ICD-10-CM

## 2020-11-23 PROCEDURE — 96910 PHOTCHMTX TAR&UVB/PTRLTM&UVB: CPT | Performed by: DERMATOLOGY

## 2020-11-25 ENCOUNTER — OFFICE VISIT (OUTPATIENT)
Dept: DERMATOLOGY | Facility: CLINIC | Age: 50
End: 2020-11-25
Payer: COMMERCIAL

## 2020-11-25 DIAGNOSIS — L40.9 PSORIASIS: Primary | ICD-10-CM

## 2020-11-25 PROCEDURE — 96910 PHOTCHMTX TAR&UVB/PTRLTM&UVB: CPT | Performed by: DERMATOLOGY

## 2020-11-27 DIAGNOSIS — L40.9 SCALP PSORIASIS: ICD-10-CM

## 2020-11-27 RX ORDER — CLOBETASOL PROPIONATE 0.46 MG/ML
SOLUTION TOPICAL
Qty: 50 ML | Refills: 5 | Status: SHIPPED | OUTPATIENT
Start: 2020-11-27 | End: 2021-12-17

## 2020-11-30 ENCOUNTER — OFFICE VISIT (OUTPATIENT)
Dept: DERMATOLOGY | Facility: CLINIC | Age: 50
End: 2020-11-30
Payer: COMMERCIAL

## 2020-11-30 DIAGNOSIS — L40.9 PSORIASIS: Primary | ICD-10-CM

## 2020-11-30 PROCEDURE — 96900 ACTINOTHERAPY UV LIGHT: CPT | Performed by: DERMATOLOGY

## 2020-12-02 ENCOUNTER — OFFICE VISIT (OUTPATIENT)
Dept: DERMATOLOGY | Facility: CLINIC | Age: 50
End: 2020-12-02
Payer: COMMERCIAL

## 2020-12-02 DIAGNOSIS — L40.9 PSORIASIS: Primary | ICD-10-CM

## 2020-12-02 PROCEDURE — 96900 ACTINOTHERAPY UV LIGHT: CPT | Performed by: DERMATOLOGY

## 2020-12-03 DIAGNOSIS — E78.2 MIXED HYPERLIPIDEMIA: ICD-10-CM

## 2020-12-04 RX ORDER — ROSUVASTATIN CALCIUM 10 MG/1
TABLET, COATED ORAL
Qty: 90 TABLET | Refills: 1 | Status: SHIPPED | OUTPATIENT
Start: 2020-12-04 | End: 2021-06-07

## 2020-12-07 ENCOUNTER — OFFICE VISIT (OUTPATIENT)
Dept: DERMATOLOGY | Facility: CLINIC | Age: 50
End: 2020-12-07
Payer: COMMERCIAL

## 2020-12-07 DIAGNOSIS — L40.9 PSORIASIS: Primary | ICD-10-CM

## 2020-12-07 PROCEDURE — 96900 ACTINOTHERAPY UV LIGHT: CPT | Performed by: DERMATOLOGY

## 2020-12-09 ENCOUNTER — OFFICE VISIT (OUTPATIENT)
Dept: DERMATOLOGY | Facility: CLINIC | Age: 50
End: 2020-12-09
Payer: COMMERCIAL

## 2020-12-09 DIAGNOSIS — L40.9 PSORIASIS: Primary | ICD-10-CM

## 2020-12-09 PROCEDURE — 96900 ACTINOTHERAPY UV LIGHT: CPT | Performed by: DERMATOLOGY

## 2020-12-14 ENCOUNTER — OFFICE VISIT (OUTPATIENT)
Dept: DERMATOLOGY | Facility: CLINIC | Age: 50
End: 2020-12-14
Payer: COMMERCIAL

## 2020-12-14 DIAGNOSIS — L40.9 PSORIASIS: Primary | ICD-10-CM

## 2020-12-14 PROCEDURE — 96900 ACTINOTHERAPY UV LIGHT: CPT | Performed by: STUDENT IN AN ORGANIZED HEALTH CARE EDUCATION/TRAINING PROGRAM

## 2020-12-16 ENCOUNTER — OFFICE VISIT (OUTPATIENT)
Dept: DERMATOLOGY | Facility: CLINIC | Age: 50
End: 2020-12-16
Payer: COMMERCIAL

## 2020-12-16 DIAGNOSIS — L40.9 PSORIASIS: Primary | ICD-10-CM

## 2020-12-16 PROCEDURE — 96900 ACTINOTHERAPY UV LIGHT: CPT | Performed by: STUDENT IN AN ORGANIZED HEALTH CARE EDUCATION/TRAINING PROGRAM

## 2020-12-21 ENCOUNTER — OFFICE VISIT (OUTPATIENT)
Dept: DERMATOLOGY | Facility: CLINIC | Age: 50
End: 2020-12-21
Payer: COMMERCIAL

## 2020-12-21 DIAGNOSIS — L40.9 PSORIASIS: Primary | ICD-10-CM

## 2020-12-21 PROCEDURE — 96900 ACTINOTHERAPY UV LIGHT: CPT | Performed by: DERMATOLOGY

## 2020-12-23 ENCOUNTER — OFFICE VISIT (OUTPATIENT)
Dept: DERMATOLOGY | Facility: CLINIC | Age: 50
End: 2020-12-23
Payer: COMMERCIAL

## 2020-12-23 DIAGNOSIS — L40.9 PSORIASIS: Primary | ICD-10-CM

## 2020-12-23 PROCEDURE — 96900 ACTINOTHERAPY UV LIGHT: CPT | Performed by: DERMATOLOGY

## 2020-12-24 ENCOUNTER — TELEPHONE (OUTPATIENT)
Dept: DERMATOLOGY | Facility: CLINIC | Age: 50
End: 2020-12-24

## 2020-12-28 ENCOUNTER — OFFICE VISIT (OUTPATIENT)
Dept: DERMATOLOGY | Facility: CLINIC | Age: 50
End: 2020-12-28
Payer: COMMERCIAL

## 2020-12-28 DIAGNOSIS — L40.9 PSORIASIS: Primary | ICD-10-CM

## 2020-12-28 PROCEDURE — 96900 ACTINOTHERAPY UV LIGHT: CPT | Performed by: STUDENT IN AN ORGANIZED HEALTH CARE EDUCATION/TRAINING PROGRAM

## 2020-12-29 ENCOUNTER — OFFICE VISIT (OUTPATIENT)
Dept: FAMILY MEDICINE CLINIC | Facility: CLINIC | Age: 50
End: 2020-12-29
Payer: COMMERCIAL

## 2020-12-29 VITALS
OXYGEN SATURATION: 98 % | BODY MASS INDEX: 28.46 KG/M2 | HEIGHT: 70 IN | HEART RATE: 68 BPM | RESPIRATION RATE: 14 BRPM | WEIGHT: 198.8 LBS | SYSTOLIC BLOOD PRESSURE: 144 MMHG | DIASTOLIC BLOOD PRESSURE: 70 MMHG | TEMPERATURE: 97.6 F

## 2020-12-29 DIAGNOSIS — M25.511 CHRONIC RIGHT SHOULDER PAIN: Primary | ICD-10-CM

## 2020-12-29 DIAGNOSIS — G89.29 CHRONIC RIGHT SHOULDER PAIN: Primary | ICD-10-CM

## 2020-12-29 PROCEDURE — 3077F SYST BP >= 140 MM HG: CPT | Performed by: FAMILY MEDICINE

## 2020-12-29 PROCEDURE — 3078F DIAST BP <80 MM HG: CPT | Performed by: FAMILY MEDICINE

## 2020-12-29 PROCEDURE — 4004F PT TOBACCO SCREEN RCVD TLK: CPT | Performed by: FAMILY MEDICINE

## 2020-12-29 PROCEDURE — 3008F BODY MASS INDEX DOCD: CPT | Performed by: FAMILY MEDICINE

## 2020-12-29 PROCEDURE — 99213 OFFICE O/P EST LOW 20 MIN: CPT | Performed by: FAMILY MEDICINE

## 2020-12-30 ENCOUNTER — OFFICE VISIT (OUTPATIENT)
Dept: DERMATOLOGY | Facility: CLINIC | Age: 50
End: 2020-12-30
Payer: COMMERCIAL

## 2020-12-30 DIAGNOSIS — L40.9 PSORIASIS: Primary | ICD-10-CM

## 2020-12-30 PROCEDURE — 96900 ACTINOTHERAPY UV LIGHT: CPT | Performed by: DERMATOLOGY

## 2021-01-04 ENCOUNTER — OFFICE VISIT (OUTPATIENT)
Dept: DERMATOLOGY | Facility: CLINIC | Age: 51
End: 2021-01-04
Payer: COMMERCIAL

## 2021-01-04 DIAGNOSIS — L40.9 PSORIASIS: Primary | ICD-10-CM

## 2021-01-04 PROCEDURE — 96900 ACTINOTHERAPY UV LIGHT: CPT | Performed by: DERMATOLOGY

## 2021-01-04 NOTE — PROGRESS NOTES
PHOTOTHERAPY NURSE VISIT    Physical Exam   Diagnosis: Psoriasis   Anatomic location affected: Back, abdomen, chest, thighs, right shin, scalp, and ears   Severity: Mild   BSA: 40%    Plan:   Treatment Schedule: MW   Reaction from previous therapy: None   mJoules today: 751 - patient reached maximum dose   Topical Applied: yes - mineral oil    Secondary Treatment: None      Based on a thorough discussion of this condition and the management approach to it (including a comprehensive discussion of the known risks, side effects and potential benefits of treatment), the patient (family) agrees to implement the following specific plan:    Patient wore appropriate eye protection, protective clothing to cover unaffected skin, approved footwear if needed   Topical was applied by nurse and patient     All questions were answered no complications reported   Patient's next scheduled appointment: 1/6/21

## 2021-01-06 ENCOUNTER — OFFICE VISIT (OUTPATIENT)
Dept: DERMATOLOGY | Facility: CLINIC | Age: 51
End: 2021-01-06
Payer: COMMERCIAL

## 2021-01-06 DIAGNOSIS — L40.9 PSORIASIS: Primary | ICD-10-CM

## 2021-01-06 PROCEDURE — 96900 ACTINOTHERAPY UV LIGHT: CPT | Performed by: DERMATOLOGY

## 2021-01-06 NOTE — PROGRESS NOTES
PHOTOTHERAPY NURSE VISIT    Physical Exam   Diagnosis: Psoriasis   Anatomic location affected: Back, abdomen, chest, thighs, right shin, scalp, and ears   Severity: Mild   BSA: 40%    Plan:   Treatment Schedule: MW   Reaction from previous therapy: None   mJoules today: 756   Topical Applied: yes - mineral oil    Secondary Treatment: None      Based on a thorough discussion of this condition and the management approach to it (including a comprehensive discussion of the known risks, side effects and potential benefits of treatment), the patient (family) agrees to implement the following specific plan:    Patient wore appropriate eye protection, protective clothing to cover unaffected skin, approved footwear if needed   Topical was applied by nurse and patient     All questions were answered no complications reported   Patient's next scheduled appointment: 1/11/21

## 2021-01-11 ENCOUNTER — OFFICE VISIT (OUTPATIENT)
Dept: DERMATOLOGY | Facility: CLINIC | Age: 51
End: 2021-01-11
Payer: COMMERCIAL

## 2021-01-11 DIAGNOSIS — L40.9 PSORIASIS: Primary | ICD-10-CM

## 2021-01-11 PROCEDURE — 96900 ACTINOTHERAPY UV LIGHT: CPT | Performed by: DERMATOLOGY

## 2021-01-11 NOTE — PROGRESS NOTES
PHOTOTHERAPY NURSE VISIT    Physical Exam   Diagnosis: Psoriasis    Anatomic location affected: Back, abdomen, chest, thighs, right shin, scalp, and ears    Severity: Mild   BSA: 40%    Plan:   Treatment Schedule: MW   Reaction from previous therapy: none   mJoules today: 752   Topical Applied: yes - mineral oil     Secondary Treatment: none       Based on a thorough discussion of this condition and the management approach to it (including a comprehensive discussion of the known risks, side effects and potential benefits of treatment), the patient (family) agrees to implement the following specific plan:    Patient wore appropriate eye protection, protective clothing to cover unaffected skin, approved footwear if needed     Topical was applied by nurse and patient    All questions were answered no complications reported   Patient's next scheduled appointment: 1/13/21

## 2021-01-12 DIAGNOSIS — N52.9 ERECTILE DYSFUNCTION, UNSPECIFIED ERECTILE DYSFUNCTION TYPE: Primary | ICD-10-CM

## 2021-01-12 RX ORDER — SILDENAFIL 50 MG/1
50 TABLET, FILM COATED ORAL DAILY PRN
Qty: 10 TABLET | Refills: 5 | Status: SHIPPED | OUTPATIENT
Start: 2021-01-12

## 2021-01-13 ENCOUNTER — OFFICE VISIT (OUTPATIENT)
Dept: DERMATOLOGY | Facility: CLINIC | Age: 51
End: 2021-01-13
Payer: COMMERCIAL

## 2021-01-13 ENCOUNTER — CONSULT (OUTPATIENT)
Dept: OBGYN CLINIC | Facility: CLINIC | Age: 51
End: 2021-01-13
Payer: COMMERCIAL

## 2021-01-13 VITALS
SYSTOLIC BLOOD PRESSURE: 136 MMHG | HEIGHT: 70 IN | DIASTOLIC BLOOD PRESSURE: 72 MMHG | WEIGHT: 198 LBS | BODY MASS INDEX: 28.35 KG/M2 | HEART RATE: 76 BPM

## 2021-01-13 DIAGNOSIS — M25.511 CHRONIC RIGHT SHOULDER PAIN: Primary | ICD-10-CM

## 2021-01-13 DIAGNOSIS — G89.29 CHRONIC RIGHT SHOULDER PAIN: Primary | ICD-10-CM

## 2021-01-13 DIAGNOSIS — L40.9 PSORIASIS: Primary | ICD-10-CM

## 2021-01-13 PROCEDURE — 99213 OFFICE O/P EST LOW 20 MIN: CPT | Performed by: ORTHOPAEDIC SURGERY

## 2021-01-13 PROCEDURE — 4004F PT TOBACCO SCREEN RCVD TLK: CPT | Performed by: ORTHOPAEDIC SURGERY

## 2021-01-13 PROCEDURE — 3075F SYST BP GE 130 - 139MM HG: CPT | Performed by: ORTHOPAEDIC SURGERY

## 2021-01-13 PROCEDURE — 3078F DIAST BP <80 MM HG: CPT | Performed by: ORTHOPAEDIC SURGERY

## 2021-01-13 PROCEDURE — 96900 ACTINOTHERAPY UV LIGHT: CPT | Performed by: DERMATOLOGY

## 2021-01-13 NOTE — PROGRESS NOTES
PHOTOTHERAPY NURSE VISIT    Physical Exam   Diagnosis: Psoriasis    Anatomic location affected: Back, abdomen, chest, thighs, right shin, and ears    Severity: Mild   BSA: 40%    Plan:   Treatment Schedule: MW   Reaction from previous therapy: none   mJoules today: 757   Topical Applied: yes - mineral oil     Secondary Treatment: none      Based on a thorough discussion of this condition and the management approach to it (including a comprehensive discussion of the known risks, side effects and potential benefits of treatment), the patient (family) agrees to implement the following specific plan:    Patient wore appropriate eye protection, protective clothing to cover unaffected skin, approved footwear if needed     Topical was applied by nurse and patient    All questions were answered no complications reported   Patient's next scheduled appointment: 01/18/2021

## 2021-01-13 NOTE — PROGRESS NOTES
Assessment:  1  Right Shoulder Pain  Ambulatory referral to Orthopedic Surgery    Ambulatory referral to Physical Therapy   Patient's symptoms are probably from degenerative changes of labrum over time with possible biceps tendonitis all causing restriction in internal rotation  Plan      The decision was made to pursue non-operative treatment  We will have the patient start physical therapy for stretching and strengthening of right shoulder  A steroid injection was offered to the patient for acute pain relief, which he decided to hold off on  We will follow up with the 6 weeks to check on clinical improvement after starting therapy  He will call us sooner if he wishes for steroid injection at any time if symptoms worsen  Subjective:    Chief Complaint:Angelo Perry 44 y o  male with right shoulder pain  HPI    Patient comes in today with regards to right shoulder pain  The patient reports that the pain is in the right shoulder and has been going on for 8 months without an inciting injury  The pain is rated at5 at its best and0 at its worst   The pain is described as achy  It is worsened with holding shoulder in elevated position and after activity, and is made better with rest when holding in fully adducted position towards body  The patient has taken NSAIDs for treatment but has not tried any other treatment  He is a RHD male with no previous history of injury or pain to right shoulder        The following portions of the patient's history were reviewed and updated as appropriate: allergies, current medications, past family history, past social history, past surgical history and problem list         Social History     Socioeconomic History    Marital status: /Civil Union     Spouse name: Not on file    Number of children: Not on file    Years of education: Not on file    Highest education level: Not on file   Occupational History    Not on file   Social Needs    Financial resource strain: Not on file    Food insecurity     Worry: Not on file     Inability: Not on file    Transportation needs     Medical: Not on file     Non-medical: Not on file   Tobacco Use    Smoking status: Light Tobacco Smoker     Packs/day: 0 25    Smokeless tobacco: Never Used    Tobacco comment: cigars daily   Substance and Sexual Activity    Alcohol use: Yes     Frequency: 2-4 times a month     Comment: socially    Drug use: No    Sexual activity: Not on file   Lifestyle    Physical activity     Days per week: Not on file     Minutes per session: Not on file    Stress: Not on file   Relationships    Social connections     Talks on phone: Not on file     Gets together: Not on file     Attends Bahai service: Not on file     Active member of club or organization: Not on file     Attends meetings of clubs or organizations: Not on file     Relationship status: Not on file    Intimate partner violence     Fear of current or ex partner: Not on file     Emotionally abused: Not on file     Physically abused: Not on file     Forced sexual activity: Not on file   Other Topics Concern    Not on file   Social History Narrative    Not on file     Past Medical History:   Diagnosis Date    Arthritis     Constipation      Past Surgical History:   Procedure Laterality Date    COLONOSCOPY      [place] ESOPHAGOGASTRODUODENOSCOPY TRANSORAL DIAGNOSTIC N/A 10/18/2018    Procedure: EGD AND COLONOSCOPY;  Surgeon: Melissa Valentine DO;  Location: [facility];   Service: Gastroenterology    UPPER GASTROINTESTINAL ENDOSCOPY       No Known Allergies  Current Outpatient Medications on File Prior to Visit   Medication Sig Dispense Refill    calcipotriene (DOVONOX) 0 005 % ointment Apply topically 2 (two) times a day Saturday and Sunday 60 g 6    Cetirizine HCl (ZYRTEC ALLERGY) 10 MG CAPS Take by mouth as needed       clobetasol (TEMOVATE) 0 05 % external solution APPLY TO AFFECTED AREA EVERY DAY 50 mL 5    clotrimazole (LOTRIMIN) 1 % cream Apply topically 2 (two) times a day 30 g 0    fluticasone (FLONASE ALLERGY RELIEF) 50 mcg/act nasal spray as needed       lisinopril (ZESTRIL) 10 mg tablet Take 1 tablet (10 mg total) by mouth daily 90 tablet 1    lubiprostone (AMITIZA) 24 mcg capsule Take 1 capsule (24 mcg total) by mouth 2 (two) times a day with meals 60 capsule 3    rosuvastatin (CRESTOR) 10 MG tablet TAKE 1 TABLET BY MOUTH EVERY DAY 90 tablet 1    sildenafil (VIAGRA) 50 MG tablet Take 1 tablet (50 mg total) by mouth daily as needed for erectile dysfunction 10 tablet 5    triamcinolone (KENALOG) 0 1 % ointment Apply topically 2 (two) times a day Monday thru Friday 80 g 6     No current facility-administered medications on file prior to visit  Objective:    Review of Systems   Constitutional: Negative  HENT: Negative  Eyes: Negative  Respiratory: Negative  Cardiovascular: Negative  Gastrointestinal: Negative  Endocrine: Negative  Genitourinary: Negative  Musculoskeletal: Positive for arthralgias and myalgias  Skin: Negative  Allergic/Immunologic: Negative  Neurological: Negative  Hematological: Negative  Psychiatric/Behavioral: Negative  All other systems reviewed and are negative  Right Shoulder Exam:  TTP Right Shoulder over [address] Joint  [facility]  Forward Flexion-130 degrees  Abduction-130 degrees  External Rotation-110 degrees  Internal Rotation-45 (80 degrees on contralateral side)  Positive [place]'s Test  Negative Neer's Test  Negative Theo's Test  Negative Bear Hug and Belly Press Test      Physical Exam  Constitutional:       Appearance: Normal appearance  HENT:      Head: Normocephalic  Nose: Nose normal    Eyes:      Conjunctiva/sclera: Conjunctivae normal    Neck:      Musculoskeletal: Normal range of motion  Cardiovascular:      Rate and Rhythm: Normal rate and regular rhythm  Pulses: Normal pulses     Pulmonary:      Effort: Pulmonary effort is normal    Abdominal:      Palpations: Abdomen is soft  Musculoskeletal:         General: Tenderness present  Skin:     General: Skin is warm  Neurological:      General: No focal deficit present  Mental Status: He is alert  Psychiatric:         Mood and Affect: Mood normal          Behavior: Behavior normal          No Procedures performed today    I have personally reviewed pertinent films in PACS and my interpretation is X-rays of right shoulder show minimal degenerative changes at glenohumeral joint with no acute fractures or dislocations  There is degenerative changes at the [facility] joint as well         Portions of the record may have been created with voice recognition software   Occasional wrong word or "sound a like" substitutions may have occurred due to the inherent limitations of voice recognition software   Read the chart carefully and recognize, using context, where substitutions have occurred

## 2021-01-18 ENCOUNTER — OFFICE VISIT (OUTPATIENT)
Dept: DERMATOLOGY | Facility: CLINIC | Age: 51
End: 2021-01-18
Payer: COMMERCIAL

## 2021-01-18 DIAGNOSIS — L40.9 PSORIASIS: Primary | ICD-10-CM

## 2021-01-18 PROCEDURE — 96900 ACTINOTHERAPY UV LIGHT: CPT | Performed by: STUDENT IN AN ORGANIZED HEALTH CARE EDUCATION/TRAINING PROGRAM

## 2021-01-18 NOTE — PROGRESS NOTES
PHOTOTHERAPY NURSE VISIT    Physical Exam   Diagnosis: Psoriasis   Anatomic location affected: Back, abdomen, chest, thighs, right shin, and ears   Severity: Mild   BSA: 40%    Plan:   Treatment Schedule: MW   Reaction from previous therapy: None   mJoules today: 751   Topical Applied: yes - mineral oil    Secondary Treatment: None      Based on a thorough discussion of this condition and the management approach to it (including a comprehensive discussion of the known risks, side effects and potential benefits of treatment), the patient (family) agrees to implement the following specific plan:    Patient wore appropriate eye protection, protective clothing to cover unaffected skin, approved footwear if needed   Topical was applied by nurse and patient     All questions were answered no complications reported   Patient's next scheduled appointment: 1/20/21

## 2021-01-20 ENCOUNTER — OFFICE VISIT (OUTPATIENT)
Dept: DERMATOLOGY | Facility: CLINIC | Age: 51
End: 2021-01-20
Payer: COMMERCIAL

## 2021-01-20 DIAGNOSIS — L40.9 PSORIASIS: Primary | ICD-10-CM

## 2021-01-20 PROCEDURE — 96900 ACTINOTHERAPY UV LIGHT: CPT | Performed by: STUDENT IN AN ORGANIZED HEALTH CARE EDUCATION/TRAINING PROGRAM

## 2021-01-20 NOTE — PROGRESS NOTES
PHOTOTHERAPY NURSE VISIT    Physical Exam   Diagnosis: Psoriasis   Anatomic location affected: Back, abdomen, chest, thighs, right shin, and ears   Severity: Mild   BSA: 40%    Plan:   Treatment Schedule: MW   Reaction from previous therapy: None   mJoules today: 754   Topical Applied: yes - mineral oil    Secondary Treatment: None      Based on a thorough discussion of this condition and the management approach to it (including a comprehensive discussion of the known risks, side effects and potential benefits of treatment), the patient (family) agrees to implement the following specific plan:    Patient wore appropriate eye protection, protective clothing to cover unaffected skin, approved footwear if needed   Topical was applied by nurse and patient     All questions were answered no complications reported   Patient's next scheduled appointment: 1/21/21 - patients 2 month follow-up appointment with Dr Haylee Munoz

## 2021-01-21 ENCOUNTER — OFFICE VISIT (OUTPATIENT)
Dept: DERMATOLOGY | Facility: CLINIC | Age: 51
End: 2021-01-21
Payer: COMMERCIAL

## 2021-01-21 ENCOUNTER — TELEPHONE (OUTPATIENT)
Dept: DERMATOLOGY | Facility: CLINIC | Age: 51
End: 2021-01-21

## 2021-01-21 VITALS — HEIGHT: 70 IN | TEMPERATURE: 97.9 F | WEIGHT: 200 LBS | BODY MASS INDEX: 28.63 KG/M2

## 2021-01-21 DIAGNOSIS — L40.9 PSORIASIS: Primary | ICD-10-CM

## 2021-01-21 PROCEDURE — 3008F BODY MASS INDEX DOCD: CPT | Performed by: ORTHOPAEDIC SURGERY

## 2021-01-21 PROCEDURE — 99213 OFFICE O/P EST LOW 20 MIN: CPT | Performed by: DERMATOLOGY

## 2021-01-21 NOTE — TELEPHONE ENCOUNTER
You can increase phototherapy to max 850 mJ    Skin Type:  II     Treatment Type:  Lo UVB     Schedule:  3 X WEEK      Secondary Treatment:  No     Topical Application:  Mineral oil      Mjoules: increase 15 mJ per session to max 850 mJ

## 2021-01-21 NOTE — PROGRESS NOTES
[facility] Follow Up Note    Patient Name: Kamilah Ortiz  Encounter Date: 01/21/2021    Today's Chief Concerns:  [facility] Concern #1:  Psoriasis follow up     Current Medications:    Current Outpatient Medications:     Cetirizine HCl (ZYRTEC ALLERGY) 10 MG CAPS, Take by mouth as needed , Disp: , Rfl:     clobetasol (TEMOVATE) 0 05 % external solution, APPLY TO AFFECTED AREA EVERY DAY, Disp: 50 mL, Rfl: 5    clotrimazole (LOTRIMIN) 1 % cream, Apply topically 2 (two) times a day, Disp: 30 g, Rfl: 0    fluticasone (FLONASE ALLERGY RELIEF) 50 mcg/act nasal spray, as needed , Disp: , Rfl:     lisinopril (ZESTRIL) 10 mg tablet, Take 1 tablet (10 mg total) by mouth daily, Disp: 90 tablet, Rfl: 1    lubiprostone (AMITIZA) 24 mcg capsule, Take 1 capsule (24 mcg total) by mouth 2 (two) times a day with meals, Disp: 60 capsule, Rfl: 3    rosuvastatin (CRESTOR) 10 MG tablet, TAKE 1 TABLET BY MOUTH EVERY DAY, Disp: 90 tablet, Rfl: 1    calcipotriene (DOVONOX) 0 005 % ointment, Apply topically 2 (two) times a day Saturday and Sunday (Patient not taking: Reported on 1/21/2021), Disp: 60 g, Rfl: 6    sildenafil (VIAGRA) 50 MG tablet, Take 1 tablet (50 mg total) by mouth daily as needed for erectile dysfunction, Disp: 10 tablet, Rfl: 5    triamcinolone (KENALOG) 0 1 % ointment, Apply topically 2 (two) times a day Monday thru Friday (Patient not taking: Reported on 1/21/2021), Disp: 80 g, Rfl: 6    CONSTITUTIONAL:   Vitals:    01/21/21 1445   Temp: 97 9 °F (36 6 °C)   TempSrc: Tympanic   Weight: 90 7 kg (200 lb)   Height: 5' 10" (1 778 m)       Specific Alerts:    Have you been seen by a [facility] in the last 3 years? YES    Are you pregnant or planning to become pregnant? No    Are you currently or planning to be nursing or breast feeding? No    No Known Allergies    May we call your Preferred Phone number to discuss your specific medical information?  YES    May we leave a detailed message that includes your specific medical information? YES    Have you traveled outside of the [facility] in the past 3 months? No    Do you currently have a pacemaker or defibrillator? No    Do you have any artificial heart valves, joints, plates, screws, rods, stents, pins, etc? No   - If Yes, were any placed within the last 2 years? Do you require any medications prior to a surgical procedure? No   - If Yes, for which procedure? n a   - If Yes, what medications to you require? n a    Are you taking any medications that cause you to bleed more easily ("blood thinners") No    Have you ever experienced a rapid heartbeat with epinephrine? No    Have you ever been treated with "gold" (gold sodium thiomalate) therapy? No    [facility] can help with wrinkles, "laugh lines," facial volume loss, "double chin," "love handles," age spots, and more  Are you interested in learning today about some of the skin enhancement procedures that we offer? (If Yes, please provide more detail) No    Review of Systems:  Have you recently had or currently have any of the following?     · Fever or chills: No  · Night Sweats: No  · Headaches: No  · Weight Gain: No  · Weight Loss: No  · Blurry Vision: No  · Nausea: No  · Vomiting: No  · Diarrhea: No  · Blood in Stool: No  · Abdominal Pain: No  · Itchy Skin: YES  · Painful Joints: YES  · Swollen Joints: No  · Muscle Pain: No  · Irregular Mole: No  · Sun Burn: No  · Dry Skin: YES  · Skin Color Changes: No  · Scar or Keloid: No  · Cold Sores/Fever Blisters: No  · Bacterial Infections/MRSA: No  · Anxiety: No  · Depression: No  · Suicidal or Homicidal Thoughts: No      PSYCH: Normal mood and affect  EYES: Normal conjunctiva  ENT: Normal lips and oral mucosa  CARDIOVASCULAR: No edema  RESPIRATORY: Normal respirations  HEME/LYMPH/IMMUNO:  No regional lymphadenopathy except as noted below in ASSESSMENT AND PLAN BY DIAGNOSIS    FULL ORGAN SYSTEM SKIN EXAM (SKIN)   Hair, Scalp, Ears, Face Normal except as noted below in Assessment   Neck, Cervical Chain Nodes Normal except as noted below in Assessment   Right Arm/Hand/Fingers Normal except as noted below in Assessment   Left Arm/Hand/Fingers Normal except as noted below in Assessment   Chest/Breasts/Axillae Viewed areas Normal except as noted below in Assessment   Abdomen, Umbilicus Normal except as noted below in Assessment   Back/Spine Normal except as noted below in Assessment                   1  FOLLOW UP PSORIASIS    Physical Exam:   Anatomic Location Affected:  Trunk and extremities    Morphological Description:  Scaly pink plaques    Severity: mild   Body Percent Affected: 4%   Pertinent Positives:   Pertinent Negatives:    Assessment and Plan:  Based on a thorough discussion of this condition and the management approach to it (including a comprehensive discussion of the known risks, side effects and potential benefits of treatment), the patient (family) agrees to implement the following specific plan:   Continue phototherapy treatments    We will work on home phototherapy denton    Continue topicals as needed     Psoriasis is a chronic inflammatory condition that causes the body to make new skin cells in days rather than weeks  As these cells pile up on the surface of the skin, you may see thick, scaly patches of thickened skin  Psoriasis affects 2-4% of males and females  It can start at any age including childhood, with peaks of onset at 15-25 years and 50-60 years  It tends to persist lifelong, fluctuating in extent and severity  It is particularly common in Caucasians but may affect people of any race  About one-third of patients with psoriasis have family members with psoriasis  Psoriasis is multifactorial  It is classified as an immune-mediated inflammatory disease (IMID)  Genetic factors are important and influence the type of psoriasis and response to treatment       What are the signs and symptoms of psoriasis? There are many different types of psoriasis that each have present uniquely  The types of psoriasis include:    Plaque psoriasis: About 80% to 90% of people who have psoriasis develop this type  When plaque psoriasis appears, you may see:  Plaque psoriasis usually presents with symmetrically distributed, red, scaly plaques with well-defined edges  The scale is typically silvery white, except in skin folds where the plaques often appear shiny and they may have a moist peeling surface  The most common sites are scalp, elbows and knees, but any part of the skin can be involved  The plaques are usually very persistent without treatment  Itch is mostly mild but may be severe in some patients, leading to scratching and lichenification (thickened leathery skin with increased skin markings)  Painful skin cracks or fissures may occur  When psoriatic plaques clear up, they may leave brown or pale marks that can be expected to fade over several months  Guttate psoriasis: When someone gets this type of psoriasis, you often see tiny bumps appear on the skin quite suddenly  The bumps tend to cover much of the torso, legs, and arms  Sometimes, the bumps also develop on the face, scalp, and ears  No matter where they appear, the bumps tend to be:    Small and scaly   [place]-colored to pink   Temporary, clearing in a few weeks or months without treatment  When guttate psoriasis clears, it may never return  Why this happens is still a bit of a mystery  Guttate psoriasis tends to develop in children and young adults who've had an infection, such as strep throat  It's possible that when the infection clears so does guttate psoriasis    It's also possible to have:   Guttate psoriasis for life   See the guttate psoriasis clear and plaque psoriasis develop later in life   Plaque psoriasis when you develop guttate psoriasis  There's no way to predict what will happen after the first flare-up of guttate psoriasis licha  Inverse psoriasis: This type of psoriasis develops in areas where skin touches skin, such as the armpits, genitals, and crease of the buttocks  Where the inverse psoriasis appears, you're likely to notice:   Smooth, red patches of skin that look raw   Little, if any, silvery-white coating   Sore or painful skin  Other names for this type of psoriasis are intertriginous psoriasis or flexural psoriasis  Pustular psoriasis: This type of psoriasis causes pus-filled bumps that usually appear only on the feet and hands  While the pus-filled bumps may look like an infection, the skin is not infected  The bumps don't contain bacteria or anything else that could cause an infection  Where pustular psoriasis appears, you tend to notice:   Red, swollen skin that is dotted with pus-filled bumps   Extremely sore or painful skin   Brown dots (and sometimes scale) appear as the pus-filled bumps dry  Pustular psoriasis can make just about any activity that requires your hands or feet, such as typing or walking, unbearably painful  Pustular psoriasis (generalized): Serious and life-threatening, this rare type of psoriasis causes pus-filled bumps to develop on much of the skin  Also called von Zumbusch psoriasis, a flare-up causes this sequence of events:  1  Skin on most of the body suddenly turns dry, red, and tender  2  Within hours, pus-filled bumps cover most of the skin  3  Often within a day, the pus-filled bumps break open and pools of pus leak onto the skin  4  As the pus dries (usually within 24 to 48 hours), the skin dries out and peels (as shown in this picture)  5  When the dried skin peels off, you see a smooth, glazed surface  6  In a few days or weeks, you may see a new crop of pus-filled bumps covering most of the skin, as the cycle repeats itself  Anyone with pustular psoriasis also feels very sick, and may develop a fever, headache, muscle weakness, and other symptoms   Medical care is often necessary to save the person's life  Erythrodermic psoriasis: Serious and life-threatening, this type of psoriasis requires immediate medical care  When someone develops erythrodermic psoriasis, you may notice:   Skin on most of the body looks burnt   Chills, fever, and the person looks extremely ill   Muscle weakness, a rapid pulse, and severe itch  The person may also be unable to keep warm, so hypothermia can set in quickly  Most people who develop this type of psoriasis already have another type of psoriasis  Before developing erythrodermic psoriasis, they often notice that their psoriasis is worsening or not improving with treatment  If you notice either of these happening, see a board-certified dermatologist     Nails    Nail psoriasis: With any type of psoriasis, you may see changes to your fingernails or toenails  About half of the people who have plaque psoriasis see signs of psoriasis on their fingernails at some point2  When psoriasis affects the nails, you may notice:   Tiny dents in your nails (called nail pits)   White, yellow, or brown discoloration under one or more nails   Crumbling, rough nails   A nail lifting up so that it's no longer attached   Buildup of skin cells beneath one or more nails, which lifts up the nail  Treatment and proper nail care can help you control nail psoriasis  Psoriatic arthritis: If you have psoriasis, it's important to pay attention to your joints  Some people who have psoriasis develop a type of arthritis called psoriatic arthritis  This is more likely to occur if you have severe psoriasis  Most people notice psoriasis on their skin years before they develop psoriatic arthritis  It's also possible to get psoriatic arthritis before psoriasis, but this is less common  When psoriatic arthritis develops, the signs can be subtle   At first, you may notice:   A swollen and tender joint, especially in a finger or toe   Heel pain   Swelling on the back of your leg, just above your heel   Stiffness in the morning that fades during the day  Like psoriasis, psoriatic arthritis cannot be cured  Treatment can prevent psoriatic arthritis from worsening, which is important  Allowed to progress, psoriatic arthritis can become disabling  Diagnosis and treatment of psoriasis   Psoriasis is usually diagnosed by clinical features, and skin biopsy if necessary  It is important to decrease factors that aggravate psoriasis  These include treating streptococcal infections, minimizing skin injuries, avoiding sun exposure if it exacerbates psoriasis, smoking, alcohol usage, decreasing stress, and maintaining an optimal body weight  Certain medications such as lithium, beta blockers, antimalarials, and NSAIDs have also been implicated  Suddenly stopping oral steroids or strong topical steroids can cause rebound disease  There are many categories of psoriasis treatments available  Topical therapy  Mild psoriasis is generally treated with topical agents alone  Which treatment is selected may depend on body site, extent and severity of psoriasis   Emollients   Coal tar preparations   Dithranol   Salicylic acid   Vitamin D analogue (calcipotriol)   Topical corticosteroids   Calcineurin inhibitor (tacrolimus, pimecrolimus)  Phototherapy  Most psoriasis centres offer phototherapy with ultraviolet (UV) radiation, often in combination with topical or systemic agents  Types of phototherapy include   Narrowband UVB   Broadband UVB   Photochemotherapy (PUVA)   Targeted phototherapy  Systemic therapy  Moderate to severe psoriasis warrants treatment with a systemic agent and/or phototherapy   The most common treatments are:   Methotrexate   Ciclosporin   Acitretin  Other medicines occasionally used for psoriasis include:   Mycophenolate   Apremilast   Hydroxyurea   Azathioprine   6-mercaptopurine  Systemic corticosteroids are best avoided due to a risk of severe withdrawal flare of psoriasis and adverse effects  Biologics or targeted therapies are reserved for conventional treatment-resistant severe psoriasis, mainly because of expense, as side effects compare favorably with other systemic agents  These include:   Anti-tumour necrosis factor-alpha antagonists (anti-TNF?) infliximab, adalimumab and etanercept   The interleukin (IL)-12/23 antagonist ustekinumab   IL-17 antagonists such as secukinumab  Many other monoclonal antibodies are under investigation in the treatment of psoriasis    Scribe Attestation    I,:  Muna White MA am acting as a scribe while in the presence of the attending physician :       I,:  Jake Patel MD personally performed the services described in this documentation    as scribed in my presence :

## 2021-01-21 NOTE — PATIENT INSTRUCTIONS
Assessment and Plan:  Based on a thorough discussion of this condition and the management approach to it (including a comprehensive discussion of the known risks, side effects and potential benefits of treatment), the patient (family) agrees to implement the following specific plan:   Continue phototherapy treatments    We will work on home phototherapy [place]    Continue topicals as needed     Psoriasis is a chronic inflammatory condition that causes the body to make new skin cells in days rather than weeks  As these cells pile up on the surface of the skin, you may see thick, scaly patches of thickened skin  Psoriasis affects 2-4% of males and females  It can start at any age including childhood, with peaks of onset at 15-25 years and 50-60 years  It tends to persist lifelong, fluctuating in extent and severity  It is particularly common in Caucasians but may affect people of any race  About one-third of patients with psoriasis have family members with psoriasis  Psoriasis is multifactorial  It is classified as an immune-mediated inflammatory disease (IMID)  Genetic factors are important and influence the type of psoriasis and response to treatment  What are the signs and symptoms of psoriasis? There are many different types of psoriasis that each have present uniquely  The types of psoriasis include:    Plaque psoriasis: About 80% to 90% of people who have psoriasis develop this type  When plaque psoriasis appears, you may see:  Plaque psoriasis usually presents with symmetrically distributed, red, scaly plaques with well-defined edges  The scale is typically silvery white, except in skin folds where the plaques often appear shiny and they may have a moist peeling surface  The most common sites are scalp, elbows and knees, but any part of the skin can be involved  The plaques are usually very persistent without treatment    Itch is mostly mild but may be severe in some patients, leading to scratching and lichenification (thickened leathery skin with increased skin markings)  Painful skin cracks or fissures may occur  When psoriatic plaques clear up, they may leave brown or pale marks that can be expected to fade over several months  Guttate psoriasis: When someone gets this type of psoriasis, you often see tiny bumps appear on the skin quite suddenly  The bumps tend to cover much of the torso, legs, and arms  Sometimes, the bumps also develop on the face, scalp, and ears  No matter where they appear, the bumps tend to be:    Small and scaly   [place]-colored to pink   Temporary, clearing in a few weeks or months without treatment  When guttate psoriasis clears, it may never return  Why this happens is still a bit of a mystery  Guttate psoriasis tends to develop in children and young adults who've had an infection, such as strep throat  It's possible that when the infection clears so does guttate psoriasis  It's also possible to have:   Guttate psoriasis for life   See the guttate psoriasis clear and plaque psoriasis develop later in life   Plaque psoriasis when you develop guttate psoriasis  There's no way to predict what will happen after the first flare-up of guttate psoriasis clears  Inverse psoriasis: This type of psoriasis develops in areas where skin touches skin, such as the armpits, genitals, and crease of the buttocks  Where the inverse psoriasis appears, you're likely to notice:   Smooth, red patches of skin that look raw   Little, if any, silvery-white coating   Sore or painful skin  Other names for this type of psoriasis are intertriginous psoriasis or flexural psoriasis  Pustular psoriasis: This type of psoriasis causes pus-filled bumps that usually appear only on the feet and hands  While the pus-filled bumps may look like an infection, the skin is not infected  The bumps don't contain bacteria or anything else that could cause an infection    Where pustular psoriasis appears, you tend to notice:   Red, swollen skin that is dotted with pus-filled bumps   Extremely sore or painful skin   Brown dots (and sometimes scale) appear as the pus-filled bumps dry  Pustular psoriasis can make just about any activity that requires your hands or feet, such as typing or walking, unbearably painful  Pustular psoriasis (generalized): Serious and life-threatening, this rare type of psoriasis causes pus-filled bumps to develop on much of the skin  Also called von Zumbusch psoriasis, a flare-up causes this sequence of events:  1  Skin on most of the body suddenly turns dry, red, and tender  2  Within hours, pus-filled bumps cover most of the skin  3  Often within a day, the pus-filled bumps break open and pools of pus leak onto the skin  4  As the pus dries (usually within 24 to 48 hours), the skin dries out and peels (as shown in this picture)  5  When the dried skin peels off, you see a smooth, glazed surface  6  In a few days or weeks, you may see a new crop of pus-filled bumps covering most of the skin, as the cycle repeats itself  Anyone with pustular psoriasis also feels very sick, and may develop a fever, headache, muscle weakness, and other symptoms  Medical care is often necessary to save the person's life  Erythrodermic psoriasis: Serious and life-threatening, this type of psoriasis requires immediate medical care  When someone develops erythrodermic psoriasis, you may notice:   Skin on most of the body looks burnt   Chills, fever, and the person looks extremely ill   Muscle weakness, a rapid pulse, and severe itch  The person may also be unable to keep warm, so hypothermia can set in quickly  Most people who develop this type of psoriasis already have another type of psoriasis  Before developing erythrodermic psoriasis, they often notice that their psoriasis is worsening or not improving with treatment   If you notice either of these happening, see a board-certified dermatologist     Nails    Nail psoriasis: With any type of psoriasis, you may see changes to your fingernails or toenails  About half of the people who have plaque psoriasis see signs of psoriasis on their fingernails at some point2  When psoriasis affects the nails, you may notice:   Tiny dents in your nails (called nail pits)   White, yellow, or brown discoloration under one or more nails   Crumbling, rough nails   A nail lifting up so that it's no longer attached   Buildup of skin cells beneath one or more nails, which lifts up the nail  Treatment and proper nail care can help you control nail psoriasis  Psoriatic arthritis: If you have psoriasis, it's important to pay attention to your joints  Some people who have psoriasis develop a type of arthritis called psoriatic arthritis  This is more likely to occur if you have severe psoriasis  Most people notice psoriasis on their skin years before they develop psoriatic arthritis  It's also possible to get psoriatic arthritis before psoriasis, but this is less common  When psoriatic arthritis develops, the signs can be subtle  At first, you may notice:   A swollen and tender joint, especially in a finger or toe   Heel pain   Swelling on the back of your leg, just above your heel   Stiffness in the morning that fades during the day  Like psoriasis, psoriatic arthritis cannot be cured  Treatment can prevent psoriatic arthritis from worsening, which is important  Allowed to progress, psoriatic arthritis can become disabling  Diagnosis and treatment of psoriasis   Psoriasis is usually diagnosed by clinical features, and skin biopsy if necessary  It is important to decrease factors that aggravate psoriasis  These include treating streptococcal infections, minimizing skin injuries, avoiding sun exposure if it exacerbates psoriasis, smoking, alcohol usage, decreasing stress, and maintaining an optimal body weight   Certain medications such as lithium, beta blockers, antimalarials, and NSAIDs have also been implicated  Suddenly stopping oral steroids or strong topical steroids can cause rebound disease  There are many categories of psoriasis treatments available  Topical therapy  Mild psoriasis is generally treated with topical agents alone  Which treatment is selected may depend on body site, extent and severity of psoriasis   Emollients   Coal tar preparations   Dithranol   Salicylic acid   Vitamin D analogue (calcipotriol)   Topical corticosteroids   Calcineurin inhibitor (tacrolimus, pimecrolimus)  Phototherapy  Most psoriasis centres offer phototherapy with ultraviolet (UV) radiation, often in combination with topical or systemic agents  Types of phototherapy include   Narrowband UVB   Broadband UVB   Photochemotherapy (PUVA)   Targeted phototherapy  Systemic therapy  Moderate to severe psoriasis warrants treatment with a systemic agent and/or phototherapy  The most common treatments are:   Methotrexate   Ciclosporin   Acitretin  Other medicines occasionally used for psoriasis include:   Mycophenolate   Apremilast   Hydroxyurea   Azathioprine   6-mercaptopurine  Systemic corticosteroids are best avoided due to a risk of severe withdrawal flare of psoriasis and adverse effects  Biologics or targeted therapies are reserved for conventional treatment-resistant severe psoriasis, mainly because of expense, as side effects compare favorably with other systemic agents  These include:   Anti-tumour necrosis factor-alpha antagonists (anti-TNF?) infliximab, adalimumab and etanercept   The interleukin (IL)-12/23 antagonist ustekinumab   IL-17 antagonists such as secukinumab  Many other monoclonal antibodies are under investigation in the treatment of psoriasis

## 2021-01-25 ENCOUNTER — EVALUATION (OUTPATIENT)
Dept: PHYSICAL THERAPY | Facility: OTHER | Age: 51
End: 2021-01-25
Payer: COMMERCIAL

## 2021-01-25 DIAGNOSIS — G89.29 CHRONIC RIGHT SHOULDER PAIN: Primary | ICD-10-CM

## 2021-01-25 DIAGNOSIS — M25.511 CHRONIC RIGHT SHOULDER PAIN: Primary | ICD-10-CM

## 2021-01-25 DIAGNOSIS — M25.511 CHRONIC RIGHT SHOULDER PAIN: ICD-10-CM

## 2021-01-25 DIAGNOSIS — G89.29 CHRONIC RIGHT SHOULDER PAIN: ICD-10-CM

## 2021-01-25 PROCEDURE — 97161 PT EVAL LOW COMPLEX 20 MIN: CPT | Performed by: PHYSICAL THERAPIST

## 2021-01-25 NOTE — PROGRESS NOTES
PT Evaluation     Today's date: 2021  Patient name: Khoi Hilario  : 1970  MRN: [id number]  Referring provider: Zeyad Rodriges MD  Dx:   Encounter Diagnosis     ICD-10-CM    1  Right Shoulder Pain  M25 511 Ambulatory referral to Physical Therapy    G89 29    2  Chronic right shoulder pain  M25 511     G89 29        Start Time: 1400  Stop Time: 1451  Total time in clinic (min): 51 minutes    Assessment  Assessment details: Khoi Hilario is a 44 y o  male presenting with chronic R shoulder pain consistent with LHB and IST tendinopathy  Primary impairments include decreased ROM, MMT, and pain  Will benefit from skilled PT interventions for improved OH activity tolerance, work tolerance, and carrying tolerance  Impairments: abnormal coordination, abnormal or restricted ROM, activity intolerance, impaired physical strength, lacks appropriate home exercise program, pain with function, scapular dyskinesis, poor posture  and poor body mechanics  Functional limitations: Decreased lifting, carrying, OH activity, work tolerance  Symptom irritability: moderateUnderstanding of Dx/Px/POC: excellent  Goals    Short Term Goals: In 4 weeks, the patient will:  1  Full elevation ROM without painful arc  2  ER MMT to >4/5  3  Supervision with HEP for self care    Long Term Goals: In 8 weeks, the patient will:  1  IR ROM to WNL  2  FOTO to >70  3  Independent with HEP for selfcare      Plan  Plan details: 2/wk tapering down to 1/wk for 8 weeks     Patient would benefit from: skilled physical therapy  Planned modality interventions: biofeedback and low level laser therapy  Planned therapy interventions: joint mobilization, manual therapy, massage, therapeutic exercise, therapeutic activities, neuromuscular re-education, functional ROM exercises, graded activity, graded exercise, graded motor, body mechanics training, activity modification and home exercise program        Subjective    Pain Location: R anterior/lateral shoulder  Pain Intensity: 2/10 worst 9/10  QING/DOI: started around May 2020, denies known QING  Provoked by: prolonged elevation,   Eased Positions: Exercise, heat  Constitutional S/S: None reported   Dominant Hand: R  Goals: Return to PLOF    Objective    Standing         Head Position x Protracted  Neutral  Retracted   Scapular Position x Protracted  Neutral  Retracted   Thoracic Spine x Inc Kyphosis  Neutral     Lumbar Spine  Inc Lordosis  Neutral x Dec Lordosis     Strength and ROM evaluated B from a regional biomechanical perspective and values relevant to this episode recorded in table below    ROM: Goniometric measurement revealed the following findings  Shoulder ROM Right Left   Flexion WNL* WNL   Abduction WNL* WNL   ER WNL WNL   IR T7 T12     Strength: MMT revealed the following findings  Joint Motion Right Left   Sh  Flexion 4/5 5/5   Sh  Abduction 4+/5 5/5   Sh  ER 3+/5 5/5   Sh  IR 5/5 5/5     Additional Assessments:  Palpation: TTP LHB tendon   Joint mobility: Decreased T/S mobility and GHJ inferior/posterior mobility    Shoulder Specific Special Tests: For clinical decision making the Robertasdennisv Test BannerTL SUPERIOR) was used initially: Results: IR>>ER MMT which suggests extra-articular disorder                                                                                                                                                                Test / Measure  Right 1/25/2021   [place] -   Painful Arc +   Drop Arm -   Supraspinatus (empty can) -   Neer -   Sulcus  -     Pertinent Findings:                                                                                                                                                     Test / Measure  1/25/2021   FOTO 59   IR MMT 3+/5   IR ROM T12            Precautions: none      Manuals 1/25            GHJ inf/post G3-4 mobs KS            ART LHB KS            T/S Prone G5 mob KS                         Neuro Re-Ed Ther Ex             TB Row 3x10            TB I 3x10            TB W 3x10            SH FL/ABD PRE 3x10                                                                Ther Activity                                       Gait Training                                       Modalities

## 2021-01-27 ENCOUNTER — OFFICE VISIT (OUTPATIENT)
Dept: DERMATOLOGY | Facility: CLINIC | Age: 51
End: 2021-01-27
Payer: COMMERCIAL

## 2021-01-27 DIAGNOSIS — L40.9 PSORIASIS: Primary | ICD-10-CM

## 2021-01-27 PROCEDURE — 96910 PHOTCHMTX TAR&UVB/PTRLTM&UVB: CPT | Performed by: DERMATOLOGY

## 2021-01-27 NOTE — PROGRESS NOTES
PHOTOTHERAPY NURSE VISIT    Physical Exam   Diagnosis: Psoriasis    Anatomic location affected: Back, abdomen, chest, thighs, right shin, and ears    Severity: Mild   BSA: 40%    Plan:   Treatment Schedule: MW   Reaction from previous therapy: none   mJoules today: 846   Topical Applied: yes - mineral oil     Secondary Treatment: none      Based on a thorough discussion of this condition and the management approach to it (including a comprehensive discussion of the known risks, side effects and potential benefits of treatment), the patient (family) agrees to implement the following specific plan:    Patient wore appropriate eye protection, protective clothing to cover unaffected skin, approved footwear if needed     Topical was applied by nurse and patient    All questions were answered no complications reported   Patient's next scheduled appointment: 02/01/21

## 2021-01-28 ENCOUNTER — OFFICE VISIT (OUTPATIENT)
Dept: PHYSICAL THERAPY | Facility: OTHER | Age: 51
End: 2021-01-28
Payer: COMMERCIAL

## 2021-01-28 DIAGNOSIS — G89.29 CHRONIC RIGHT SHOULDER PAIN: Primary | ICD-10-CM

## 2021-01-28 DIAGNOSIS — M25.511 CHRONIC RIGHT SHOULDER PAIN: Primary | ICD-10-CM

## 2021-01-28 PROCEDURE — 97110 THERAPEUTIC EXERCISES: CPT | Performed by: PHYSICAL THERAPIST

## 2021-01-28 PROCEDURE — 97112 NEUROMUSCULAR REEDUCATION: CPT | Performed by: PHYSICAL THERAPIST

## 2021-01-28 PROCEDURE — 97140 MANUAL THERAPY 1/> REGIONS: CPT | Performed by: PHYSICAL THERAPIST

## 2021-01-28 NOTE — PROGRESS NOTES
Daily Note     Today's date: 2021  Patient name: Tenzin Maire  : 1970  MRN: [id number]  Referring provider: Aamir Lucio MD  Dx:   Encounter Diagnosis     ICD-10-CM    1  Right Shoulder Pain  M25 511     G89 29                   Subjective: Felt better after last tx for 2 days, sore today  Objective: See treatment diary below      Assessment: Tolerated treatment well  Patient would benefit from continued PT Decreased pain with elevation post TS G5 Mob  Plan: Continue per plan of care        Precautions: none      Manuals     GHJ inf/post G3-4 mobs KS KS    ART LHB KS KS    T/S Supine G5 mob  KS          Neuro Re-Ed      BU KB Stack LTR  x30    Supine MRE RS EC  30"x2    RC Press  2x10 otb    Ant band OH Press  2x10 ptb                      Ther Ex      TB Row 3x10 3x10    TB I 3x10 3x10    TB W 3x10 3x10    SH FL/ABD PRE 3x10 3x10 2# ea                            Ther Activity                  Gait Training                  Modalities

## 2021-02-01 ENCOUNTER — APPOINTMENT (OUTPATIENT)
Dept: PHYSICAL THERAPY | Facility: OTHER | Age: 51
End: 2021-02-01
Payer: COMMERCIAL

## 2021-02-03 ENCOUNTER — OFFICE VISIT (OUTPATIENT)
Dept: DERMATOLOGY | Facility: CLINIC | Age: 51
End: 2021-02-03
Payer: COMMERCIAL

## 2021-02-03 ENCOUNTER — OFFICE VISIT (OUTPATIENT)
Dept: PHYSICAL THERAPY | Facility: OTHER | Age: 51
End: 2021-02-03
Payer: COMMERCIAL

## 2021-02-03 DIAGNOSIS — G89.29 CHRONIC RIGHT SHOULDER PAIN: Primary | ICD-10-CM

## 2021-02-03 DIAGNOSIS — L40.9 PSORIASIS: Primary | ICD-10-CM

## 2021-02-03 DIAGNOSIS — M25.511 CHRONIC RIGHT SHOULDER PAIN: Primary | ICD-10-CM

## 2021-02-03 PROCEDURE — 97112 NEUROMUSCULAR REEDUCATION: CPT | Performed by: PHYSICAL THERAPIST

## 2021-02-03 PROCEDURE — 97110 THERAPEUTIC EXERCISES: CPT | Performed by: PHYSICAL THERAPIST

## 2021-02-03 PROCEDURE — 96900 ACTINOTHERAPY UV LIGHT: CPT | Performed by: DERMATOLOGY

## 2021-02-03 NOTE — PROGRESS NOTES
PHOTOTHERAPY NURSE VISIT    Physical Exam   Diagnosis: Psoriasis   Anatomic location affected: Back, abdomen, chest, thighs, right shin, and ears   Severity: Mild   BSA: 40%    Plan:   Treatment Schedule: MW   Reaction from previous therapy: Slight erythema   mJoules today: 848   Topical Applied: yes - mineral oil    Secondary Treatment: None      Based on a thorough discussion of this condition and the management approach to it (including a comprehensive discussion of the known risks, side effects and potential benefits of treatment), the patient (family) agrees to implement the following specific plan:    Patient wore appropriate eye protection, protective clothing to cover unaffected skin, approved footwear if needed   Topical was applied by nurse and patient      All questions were answered no complications reported   Patient's next scheduled appointment: 2/8/21

## 2021-02-03 NOTE — PROGRESS NOTES
Daily Note     Today's date: 2/3/2021  Patient name: Cayla Pepe  : 1970  MRN: [id number]  Referring provider: Reji Nielson MD  Dx:   Encounter Diagnosis     ICD-10-CM    1  Right Shoulder Pain  M25 511     G89 29                   Subjective: No increased pain with snow shoveling over weekend  Objective: See treatment diary below      Assessment: Tolerated treatment well  Patient would benefit from continued PT Decreased dyskinesia and pain  Increased directed LHB loading without any consequence  Plan: Continue per plan of care        Precautions: none      Manuals 1/25 1/28 2/3   GHJ inf/post G3-4 mobs KS KS KS   ART LHB KS KS    T/S Supine G5 mob  KS KS         Neuro Re-Ed      BU KB Stack LTR  x30    Supine MRE RS EC  30"x2 30"x2   RC Press  2x10 otb 2x10 otb   Ant band [place] Press  2x10 ptb 2x10 ptb   Flex Neutral to supinated eccentric   2x10 4#   KB [place] 90 deg carry   2x1 5' 7 5#   Sagital LHB biased Fly   [place] 5  3x8-10         Ther Ex      TB Row 3x10 3x10 3x10 btb   TB I 3x10 3x10 3x10 btb   TB W 3x10 3x10 3x10 otb   SH FL/ABD PRE 3x10 3x10 2# ea 3x10 3# ea                           Ther Activity                  Gait Training                  Modalities

## 2021-02-04 ENCOUNTER — APPOINTMENT (OUTPATIENT)
Dept: PHYSICAL THERAPY | Facility: OTHER | Age: 51
End: 2021-02-04
Payer: COMMERCIAL

## 2021-02-08 ENCOUNTER — OFFICE VISIT (OUTPATIENT)
Dept: PHYSICAL THERAPY | Facility: REHABILITATION | Age: 51
End: 2021-02-08
Payer: COMMERCIAL

## 2021-02-08 ENCOUNTER — OFFICE VISIT (OUTPATIENT)
Dept: DERMATOLOGY | Facility: CLINIC | Age: 51
End: 2021-02-08
Payer: COMMERCIAL

## 2021-02-08 DIAGNOSIS — M25.511 CHRONIC RIGHT SHOULDER PAIN: Primary | ICD-10-CM

## 2021-02-08 DIAGNOSIS — G89.29 CHRONIC RIGHT SHOULDER PAIN: Primary | ICD-10-CM

## 2021-02-08 DIAGNOSIS — L40.9 PSORIASIS: Primary | ICD-10-CM

## 2021-02-08 PROCEDURE — 97112 NEUROMUSCULAR REEDUCATION: CPT | Performed by: PHYSICAL THERAPIST

## 2021-02-08 PROCEDURE — 97140 MANUAL THERAPY 1/> REGIONS: CPT | Performed by: PHYSICAL THERAPIST

## 2021-02-08 PROCEDURE — 96910 PHOTCHMTX TAR&UVB/PTRLTM&UVB: CPT | Performed by: DERMATOLOGY

## 2021-02-08 PROCEDURE — 97110 THERAPEUTIC EXERCISES: CPT | Performed by: PHYSICAL THERAPIST

## 2021-02-08 NOTE — PROGRESS NOTES
Daily Note     Today's date: 2021  Patient name: Joe Iqbal  : 1970  MRN: [id number]  Referring provider: Brenden Freitas MD  Dx:   Encounter Diagnosis     ICD-10-CM    1  Right Shoulder Pain  M25 511     G89 29                   Subjective: Reports decreased pain during       Objective: See treatment diary below      Assessment: Tolerated treatment well  Patient would benefit from continued PT  Improved load tolerance and motor control  Plan: Continue per plan of care        Precautions: none      Manuals 1/25 1/28 2/3 2/8   GHJ inf/post G3-4 mobs KS KS KS KS   T/S Supine G5 mob  KS KS KS          Neuro Re-Ed       BU KB Stack LTR  x30  x30 8# DB   RC Press  2x10 otb 2x10 otb 2x10 otb   Ant band OH Press  2x10 ptb 2x10 ptb 2x10 otb   Flex Neutral to supinated eccentric   2x10 4# 2x10 4#   KB [place] 90 deg carry   2x1 5' 7 5# 2x1 5' 7 5#   Sagital LHB biased Fly   [place] 5  3x8-10 [place] 5  3x8-10          Ther Ex       [place] Row 3x10 3x10 3x10 btb 2x20 25#   [place] LPDI 3x10 3x10 3x10 btb 2x20 15#   TB W 3x10 3x10 3x10 otb 3x10 otb   SH FL/ABD PRE 3x10 3x10 2# ea 3x10 3# ea 3x10 4# ea   Bent over row    3x10 8#                        Ther Activity                     Gait Training                     Modalities

## 2021-02-08 NOTE — PROGRESS NOTES
PHOTOTHERAPY NURSE VISIT    Physical Exam   Diagnosis: Psoriasis    Anatomic location affected: Back, abdomen, chest, thighs, right shin, and ears    Severity: Mild   BSA: 40%    Plan:   Treatment Schedule: MW   Reaction from previous therapy: none   mJoules today: 851   Topical Applied: yes - mineral oil     Secondary Treatment: none      Based on a thorough discussion of this condition and the management approach to it (including a comprehensive discussion of the known risks, side effects and potential benefits of treatment), the patient (family) agrees to implement the following specific plan:    Patient wore appropriate eye protection, protective clothing to cover unaffected skin, approved footwear if needed     Topical was applied by nurse   All questions were answered no complications reported   Patient's next scheduled appointment: 02/10/2021

## 2021-02-10 ENCOUNTER — OFFICE VISIT (OUTPATIENT)
Dept: DERMATOLOGY | Facility: CLINIC | Age: 51
End: 2021-02-10
Payer: COMMERCIAL

## 2021-02-10 DIAGNOSIS — L40.9 PSORIASIS: Primary | ICD-10-CM

## 2021-02-10 PROCEDURE — 96900 ACTINOTHERAPY UV LIGHT: CPT | Performed by: DERMATOLOGY

## 2021-02-10 NOTE — PROGRESS NOTES
PHOTOTHERAPY NURSE VISIT    Physical Exam   Diagnosis: Psoriasis    Anatomic location affected: back, abdomen, chest, thighs, right shin, and ears    Severity: Mild   BSA: 40%    Plan:   Treatment Schedule: MW   Reaction from previous therapy: none   mJoules today: 853   Topical Applied: yes - mineral oil     Secondary Treatment: none      Based on a thorough discussion of this condition and the management approach to it (including a comprehensive discussion of the known risks, side effects and potential benefits of treatment), the patient (family) agrees to implement the following specific plan:    Patient wore appropriate eye protection, protective clothing to cover unaffected skin, approved footwear if needed     Topical was applied by nurse and patient    All questions were answered no complications reported   Patient's next scheduled appointment: 02/15/2021

## 2021-02-11 ENCOUNTER — OFFICE VISIT (OUTPATIENT)
Dept: PHYSICAL THERAPY | Facility: REHABILITATION | Age: 51
End: 2021-02-11
Payer: COMMERCIAL

## 2021-02-11 DIAGNOSIS — M25.511 CHRONIC RIGHT SHOULDER PAIN: Primary | ICD-10-CM

## 2021-02-11 DIAGNOSIS — G89.29 CHRONIC RIGHT SHOULDER PAIN: Primary | ICD-10-CM

## 2021-02-11 PROCEDURE — 97140 MANUAL THERAPY 1/> REGIONS: CPT | Performed by: PHYSICAL THERAPIST

## 2021-02-11 PROCEDURE — 97112 NEUROMUSCULAR REEDUCATION: CPT | Performed by: PHYSICAL THERAPIST

## 2021-02-11 PROCEDURE — 97110 THERAPEUTIC EXERCISES: CPT | Performed by: PHYSICAL THERAPIST

## 2021-02-11 NOTE — PROGRESS NOTES
Daily Note     Today's date: 2021  Patient name: Khoi Hilario  : 1970  MRN: [id number]  Referring provider: Zeyad Rodriges MD  Dx:   Encounter Diagnosis     ICD-10-CM    1  Right Shoulder Pain  M25 511     G89 29        Start Time: 1345  Stop Time: 1430  Total time in clinic (min): 45 minutes    Subjective: Pt sore today post repetitive work tasks  Objective: See treatment diary below FOTO minimal change due to hyperirittiablilty today, does report feeling much better since starting PT  Assessment: Tolerated treatment well  Patient would benefit from continued PT Tx modified today due to hyperirritability with emphasis on s/s modulation  Plan: Continue per plan of care        Precautions: none      Manuals 1/25 1/28 2/3 2/8 2/11   GHJ inf/post G3-4 mobs KS KS KS KS KS   T/S Supine G5 mob  KS KS KS KS   ART LHB     KS   Neuro Re-Ed        BU KB Stack LTR  x30  x30 8# DB x30 8# DB   RC Press  2x10 otb 2x10 otb 2x10 otb 2x10 otb   Ant band OH Press  2x10 ptb 2x10 ptb 2x10 otb 2x10 otb   Flex Neutral to supinated eccentric   2x10 4# 2x10 4#    KB [place] 90 deg carry   2x1 5' 7 5# 2x1 5' 7 5#    Sagital LHB biased Fly   [place] 5  3x8-10 [place] 5  3x8-10            Ther Ex        [place] Row 3x10 3x10 3x10 btb 2x20 25# 2x20 25#   [place] LPDI 3x10 3x10 3x10 btb 2x20 15# 2x20 15#   TB W 3x10 3x10 3x10 otb 3x10 otb 3x10 otb   SH FL/ABD PRE 3x10 3x10 2# ea 3x10 3# ea 3x10 4# ea    Bent over row    3x10 8# 3x10 8#   UBE     5'/5'                   Ther Activity                        Gait Training                        Modalities

## 2021-02-15 ENCOUNTER — OFFICE VISIT (OUTPATIENT)
Dept: DERMATOLOGY | Facility: CLINIC | Age: 51
End: 2021-02-15
Payer: COMMERCIAL

## 2021-02-15 ENCOUNTER — OFFICE VISIT (OUTPATIENT)
Dept: PHYSICAL THERAPY | Facility: REHABILITATION | Age: 51
End: 2021-02-15
Payer: COMMERCIAL

## 2021-02-15 ENCOUNTER — TELEPHONE (OUTPATIENT)
Dept: DERMATOLOGY | Facility: CLINIC | Age: 51
End: 2021-02-15

## 2021-02-15 DIAGNOSIS — L40.9 PSORIASIS: Primary | ICD-10-CM

## 2021-02-15 DIAGNOSIS — G89.29 CHRONIC RIGHT SHOULDER PAIN: Primary | ICD-10-CM

## 2021-02-15 DIAGNOSIS — M25.511 CHRONIC RIGHT SHOULDER PAIN: Primary | ICD-10-CM

## 2021-02-15 PROCEDURE — 96900 ACTINOTHERAPY UV LIGHT: CPT | Performed by: DERMATOLOGY

## 2021-02-15 PROCEDURE — 97140 MANUAL THERAPY 1/> REGIONS: CPT | Performed by: PHYSICAL THERAPIST

## 2021-02-15 PROCEDURE — 97112 NEUROMUSCULAR REEDUCATION: CPT | Performed by: PHYSICAL THERAPIST

## 2021-02-15 PROCEDURE — 97110 THERAPEUTIC EXERCISES: CPT | Performed by: PHYSICAL THERAPIST

## 2021-02-15 NOTE — TELEPHONE ENCOUNTER
Telephone call from Mike from [employer] in regards to updates on prior auth for home phototherapy unit  Preliminary results came back and pts insurance is in network with [employer]  As of now pt has a $2,500 deductible with $750 met so far  Next step is for [employer] to submit a form to pts insurance which per Mike could take anywhere from 30-45 days for a determination since it is sent via mail  If approved pt would have to pay remainder of deductible not already met  Pt aware and understands above info

## 2021-02-15 NOTE — PROGRESS NOTES
Daily Note     Today's date: 2/15/2021  Patient name: Khoi Hilario  : 1970  MRN: [id number]  Referring provider: Zeyad Rodriges MD  Dx:   Encounter Diagnosis     ICD-10-CM    1  Right Shoulder Pain  M25 511     G89 29        Start Time: 1545  Stop Time: 1630  Total time in clinic (min): 45 minutes    Subjective: Feeling much better as compared to last tx  Hyperirritability at last tx likely due to increased repetitive workload at work  Objective: See treatment diary below      Assessment: Tolerated treatment well  Patient would benefit from continued PT      Plan: Continue per plan of care        Precautions: none      Manuals 1/25 1/28 2/3 2/8 2/11 2/15   GHJ inf/post G3-4 mobs KS KS KS KS KS KS   T/S Supine G5 mob  KS KS KS KS KS   ART LHB     KS    Neuro Re-Ed         BU KB Stack LTR  x30  x30 8# DB x30 8# DB x30 8# DB   RC Press  2x10 otb 2x10 otb 2x10 otb 2x10 otb 2x10 otb   Ant band OH Press  2x10 ptb 2x10 ptb 2x10 otb 2x10 otb 2x10 otb   Flex Neutral to supinated eccentric   2x10 4# 2x10 4#  3x10 4#   KB [place] 90 deg carry   2x1 5' 7 5# 2x1 5' 7 5#  3x1' 8#   Sagital LHB biased Fly   [place] 5  3x8-10 [place] 5  3x8-10  [place] 5  3x8-10            Ther Ex         [place] Row 3x10 3x10 3x10 btb 2x20 25# 2x20 25# 2x20 30#   [place] LPDI 3x10 3x10 3x10 btb 2x20 15# 2x20 15# 2x20 15#   TB W 3x10 3x10 3x10 otb 3x10 otb 3x10 otb 3x10 otb   SH FL/ABD PRE 3x10 3x10 2# ea 3x10 3# ea 3x10 4# ea     Bent over row    3x10 8# 3x10 8#    UBE     5'/5' 5'/5'                     Ther Activity                           Gait Training                           Modalities

## 2021-02-15 NOTE — PROGRESS NOTES
PHOTOTHERAPY NURSE VISIT    Physical Exam   Diagnosis: Psoriasis   Anatomic location affected: Back, abdomen, chest, thighs, right shin, and ears   Severity: Mild   BSA: 40%    Plan:   Treatment Schedule: MW   Reaction from previous therapy: None   mJoules today: 846   Topical Applied: yes - mineral oil    Secondary Treatment: None      Based on a thorough discussion of this condition and the management approach to it (including a comprehensive discussion of the known risks, side effects and potential benefits of treatment), the patient (family) agrees to implement the following specific plan:    Patient wore appropriate eye protection, protective clothing to cover unaffected skin, approved footwear if needed   Topical was applied by nurse and patient     All questions were answered no complications reported   Patient's next scheduled appointment: 2/17/21

## 2021-02-17 ENCOUNTER — OFFICE VISIT (OUTPATIENT)
Dept: DERMATOLOGY | Facility: CLINIC | Age: 51
End: 2021-02-17
Payer: COMMERCIAL

## 2021-02-17 DIAGNOSIS — L40.9 PSORIASIS: Primary | ICD-10-CM

## 2021-02-17 PROCEDURE — 96900 ACTINOTHERAPY UV LIGHT: CPT | Performed by: DERMATOLOGY

## 2021-02-17 NOTE — PROGRESS NOTES
PHOTOTHERAPY NURSE VISIT    Physical Exam   Diagnosis: Psoriasis   Anatomic location affected: Back, abdomen, chest, thighs, right shin, and ears   Severity: Mild   BSA: 40%    Plan:   Treatment Schedule: MW   Reaction from previous therapy: None   mJoules today: 859   Topical Applied: yes - mineral oil    Secondary Treatment: None      Based on a thorough discussion of this condition and the management approach to it (including a comprehensive discussion of the known risks, side effects and potential benefits of treatment), the patient (family) agrees to implement the following specific plan:    Patient wore appropriate eye protection, protective clothing to cover unaffected skin, approved footwear if needed   Topical was applied by nurse and patient     All questions were answered no complications reported   Patient's next scheduled appointment: 2/22/21

## 2021-02-18 ENCOUNTER — APPOINTMENT (OUTPATIENT)
Dept: PHYSICAL THERAPY | Facility: REHABILITATION | Age: 51
End: 2021-02-18
Payer: COMMERCIAL

## 2021-02-22 ENCOUNTER — OFFICE VISIT (OUTPATIENT)
Dept: DERMATOLOGY | Facility: CLINIC | Age: 51
End: 2021-02-22
Payer: COMMERCIAL

## 2021-02-22 ENCOUNTER — OFFICE VISIT (OUTPATIENT)
Dept: PHYSICAL THERAPY | Facility: REHABILITATION | Age: 51
End: 2021-02-22
Payer: COMMERCIAL

## 2021-02-22 DIAGNOSIS — G89.29 CHRONIC RIGHT SHOULDER PAIN: Primary | ICD-10-CM

## 2021-02-22 DIAGNOSIS — L40.9 PSORIASIS: Primary | ICD-10-CM

## 2021-02-22 DIAGNOSIS — M25.511 CHRONIC RIGHT SHOULDER PAIN: Primary | ICD-10-CM

## 2021-02-22 PROCEDURE — 97110 THERAPEUTIC EXERCISES: CPT | Performed by: PHYSICAL THERAPIST

## 2021-02-22 PROCEDURE — 97140 MANUAL THERAPY 1/> REGIONS: CPT | Performed by: PHYSICAL THERAPIST

## 2021-02-22 PROCEDURE — 97112 NEUROMUSCULAR REEDUCATION: CPT | Performed by: PHYSICAL THERAPIST

## 2021-02-22 PROCEDURE — 96900 ACTINOTHERAPY UV LIGHT: CPT | Performed by: DERMATOLOGY

## 2021-02-22 NOTE — PROGRESS NOTES
Daily Note     Today's date: 2021  Patient name: Leonarda Lainez  : 1970  MRN: [id number]  Referring provider: Francis Coleman MD  Dx:   Encounter Diagnosis     ICD-10-CM    1  Right Shoulder Pain  M25 511     G89 29        Start Time: 1745  Stop Time: 1830  Total time in clinic (min): 45 minutes    Subjective: Decreased pain and home and work      Objective: See treatment diary below      Assessment: Tolerated treatment well  Patient would benefit from continued PT      Plan: Continue per plan of care        Precautions: none      Manuals 1/25 1/28 2/3 2/8 2/11 2/15 2/22   GHJ inf/post G3-4 mobs KS KS KS KS KS KS KS   T/S Supine G5 mob  KS KS KS KS KS KS   ART LHB     KS     Neuro Re-Ed          BU KB Stack LTR  x30  x30 8# DB x30 8# DB x30 8# DB x30 8# DB   RC Press  2x10 otb 2x10 otb 2x10 otb 2x10 otb 2x10 otb    Ant band OH Press  2x10 ptb 2x10 ptb 2x10 otb 2x10 otb 2x10 otb    Flex Neutral to supinated eccentric   2x10 4# 2x10 4#  3x10 4# 3x10 4#   KB [place] 90 deg carry   2x1 5' 7 5# 2x1 5' 7 5#  3x1' 8# 3x1' 8#   Sagital LHB biased Fly   [place] 5  3x8-10 [place] 5  3x8-10  [place] 5  3x8-10 [place] 5  3x8-10             Ther Ex          [place] Row 3x10 3x10 3x10 btb 2x20 25# 2x20 25# 2x20 30# 2x20 30#   [place] LPDI 3x10 3x10 3x10 btb 2x20 15# 2x20 15# 2x20 15# 2x20 15#   TB W 3x10 3x10 3x10 otb 3x10 otb 3x10 otb 3x10 otb 3x10 otb   SH FL/ABD PRE 3x10 3x10 2# ea 3x10 3# ea 3x10 4# ea      Bent over row    3x10 8# 3x10 8#  3x10 15#   UBE     5'/5' 5'/5' 5'/5'                       Ther Activity                              Gait Training                              Modalities

## 2021-02-22 NOTE — PROGRESS NOTES
PHOTOTHERAPY NURSE VISIT    Physical Exam   Diagnosis: Psoriasis    Anatomic location affected: Back, abdomen, chest, thighs, right shin, and ears   Severity: Mild   BSA: 40%    Plan:   Treatment Schedule: MW   Reaction from previous therapy: none   mJoules today: 858   Topical Applied: yes - mineral oil     Secondary Treatment: none      Based on a thorough discussion of this condition and the management approach to it (including a comprehensive discussion of the known risks, side effects and potential benefits of treatment), the patient (family) agrees to implement the following specific plan:    Patient wore appropriate eye protection, protective clothing to cover unaffected skin, approved footwear if needed     Topical was applied by nurse and patient    All questions were answered no complications reported   Patient's next scheduled appointment: 02/24/2021

## 2021-02-23 ENCOUNTER — APPOINTMENT (OUTPATIENT)
Dept: PHYSICAL THERAPY | Facility: REHABILITATION | Age: 51
End: 2021-02-23
Payer: COMMERCIAL

## 2021-02-24 ENCOUNTER — OFFICE VISIT (OUTPATIENT)
Dept: DERMATOLOGY | Facility: CLINIC | Age: 51
End: 2021-02-24
Payer: COMMERCIAL

## 2021-02-24 DIAGNOSIS — L40.9 PSORIASIS: Primary | ICD-10-CM

## 2021-02-24 PROCEDURE — 96910 PHOTCHMTX TAR&UVB/PTRLTM&UVB: CPT | Performed by: DERMATOLOGY

## 2021-02-24 NOTE — PROGRESS NOTES
PHOTOTHERAPY NURSE VISIT    Physical Exam   Diagnosis: Psoriasis   Anatomic location affected: Back, abdomen, chest, thighs, right shin, and ears   Severity: Mild   BSA: 40%    Plan:   Treatment Schedule: MW   Reaction from previous therapy: none   mJoules today: 860   Topical Applied: yes - mineral oil    Secondary Treatment: none      Based on a thorough discussion of this condition and the management approach to it (including a comprehensive discussion of the known risks, side effects and potential benefits of treatment), the patient (family) agrees to implement the following specific plan:    Patient wore appropriate eye protection, protective clothing to cover unaffected skin, approved footwear if needed     Topical was applied by nurse and patient   All questions were answered no complications reported   Patient's next scheduled appointment: 03/01/2021

## 2021-02-25 ENCOUNTER — OFFICE VISIT (OUTPATIENT)
Dept: PHYSICAL THERAPY | Facility: REHABILITATION | Age: 51
End: 2021-02-25
Payer: COMMERCIAL

## 2021-02-25 ENCOUNTER — OFFICE VISIT (OUTPATIENT)
Dept: OBGYN CLINIC | Facility: CLINIC | Age: 51
End: 2021-02-25
Payer: COMMERCIAL

## 2021-02-25 VITALS — WEIGHT: 200 LBS | HEIGHT: 70 IN | BODY MASS INDEX: 28.63 KG/M2

## 2021-02-25 DIAGNOSIS — M75.81 ROTATOR CUFF TENDINITIS, RIGHT: Primary | ICD-10-CM

## 2021-02-25 DIAGNOSIS — G89.29 CHRONIC RIGHT SHOULDER PAIN: Primary | ICD-10-CM

## 2021-02-25 DIAGNOSIS — M25.511 CHRONIC RIGHT SHOULDER PAIN: Primary | ICD-10-CM

## 2021-02-25 PROCEDURE — 20610 DRAIN/INJ JOINT/BURSA W/O US: CPT | Performed by: ORTHOPAEDIC SURGERY

## 2021-02-25 PROCEDURE — 99212 OFFICE O/P EST SF 10 MIN: CPT | Performed by: ORTHOPAEDIC SURGERY

## 2021-02-25 PROCEDURE — 97110 THERAPEUTIC EXERCISES: CPT | Performed by: PHYSICAL THERAPIST

## 2021-02-25 PROCEDURE — 97164 PT RE-EVAL EST PLAN CARE: CPT | Performed by: PHYSICAL THERAPIST

## 2021-02-25 PROCEDURE — 97112 NEUROMUSCULAR REEDUCATION: CPT | Performed by: PHYSICAL THERAPIST

## 2021-02-25 RX ORDER — BUPIVACAINE HYDROCHLORIDE 2.5 MG/ML
2 INJECTION, SOLUTION INFILTRATION; PERINEURAL
Status: COMPLETED | OUTPATIENT
Start: 2021-02-25 | End: 2021-02-25

## 2021-02-25 RX ORDER — LIDOCAINE HYDROCHLORIDE 10 MG/ML
1 INJECTION, SOLUTION INFILTRATION; PERINEURAL
Status: COMPLETED | OUTPATIENT
Start: 2021-02-25 | End: 2021-02-25

## 2021-02-25 RX ORDER — BETAMETHASONE SODIUM PHOSPHATE AND BETAMETHASONE ACETATE 3; 3 MG/ML; MG/ML
6 INJECTION, SUSPENSION INTRA-ARTICULAR; INTRALESIONAL; INTRAMUSCULAR; SOFT TISSUE
Status: COMPLETED | OUTPATIENT
Start: 2021-02-25 | End: 2021-02-25

## 2021-02-25 RX ADMIN — BUPIVACAINE HYDROCHLORIDE 2 ML: 2.5 INJECTION, SOLUTION INFILTRATION; PERINEURAL at 14:19

## 2021-02-25 RX ADMIN — LIDOCAINE HYDROCHLORIDE 1 ML: 10 INJECTION, SOLUTION INFILTRATION; PERINEURAL at 14:19

## 2021-02-25 RX ADMIN — BETAMETHASONE SODIUM PHOSPHATE AND BETAMETHASONE ACETATE 6 MG: 3; 3 INJECTION, SUSPENSION INTRA-ARTICULAR; INTRALESIONAL; INTRAMUSCULAR; SOFT TISSUE at 14:19

## 2021-02-25 NOTE — PROGRESS NOTES
PT Re-Evaluation     Today's date: 2021  Patient name: Elisa Mauricio  : 1970  MRN: [id number]  Referring provider: Tamir Hyatt MD  Dx:   Encounter Diagnosis     ICD-10-CM    1  Right Shoulder Pain  M25 511     G89 29        Start Time: 1445  Stop Time: 1535  Total time in clinic (min): 50 minutes    Assessment  Assessment details: Pt is making excellent progress towards goals  Primary impairments include weakness and decreased motor control, pain is well controled  Will benefit from skilled PT interventions for improved ER MMT and functional capacity  Impairments: abnormal coordination, impaired physical strength, pain with function, scapular dyskinesis and poor posture   Understanding of Dx/Px/POC: excellent  Plan  Patient would benefit from: skilled physical therapy  Planned therapy interventions: joint mobilization, manual therapy, neuromuscular re-education, functional ROM exercises, graded activity, graded exercise, home exercise program, therapeutic exercise, therapeutic activities, stretching, strengthening and postural training  Other planned therapy interventions: ISTM  Frequency: 2x week  Duration in weeks: 4        Short Term Goals: In 4 weeks, the patient will:  1  Full elevation ROM without painful arc (GOAL MET)  2  ER MMT to >4/5 (Partially Met)  3  Supervision with HEP for self care (GOAL MET)    Long Term Goals: In 8 weeks, the patient will:  1  IR ROM to WNL (Paritally MET)  2  FOTO to >70 (NOT MET)  3  Independent with HEP for selfcare (GOAL MET)    Subjective  Pt reports significant decrease in pain and improved function  Very pleased with progress to date       Objective    Pertinent Findings:                                                                                                                                                     Test / Measure  2021   FOTO 59 59   ER MMT 3+/5 4/5   IR ROM T12 T8       Precautions: none      Revaluation         KS Manuals 1/25 1/28 2/3 2/8 2/11 2/15 2/22 2/25   GHJ inf/post G3-4 mobs KS KS KS KS KS KS KS    T/S Supine G5 mob  KS KS KS KS KS KS    ART LHB     KS      Neuro Re-Ed           BU KB Stack LTR  x30  x30 8# DB x30 8# DB x30 8# DB x30 8# DB    KB [place] 90 deg carry   2x1 5' 7 5# 2x1 5' 7 5#  3x1' 8# 3x1' 8# 3x1' 8#   Sagital LHB biased Fly   [place] 5  3x8-10 [place] 5  3x8-10  [place] 5  3x8-10 [place] 5  3x8-10 [place] 5  3x8-10   D2 Flexion [place]        3x10 5#   D1 Ext [place]        3x10 10#                                               Ther Ex           [place] Row 3x10 3x10 3x10 btb 2x20 25# 2x20 25# 2x20 30# 2x20 30# 2x20 30#   [place] LPDI 3x10 3x10 3x10 btb 2x20 15# 2x20 15# 2x20 15# 2x20 15# 2x20 15#   [place] ER        5#3x10   [place] 90/90 ER        2# 3x10   Bent over row    3x10 8# 3x10 8#  3x10 15#    UBE     5'/5' 5'/5' 5'/5' 4'/4'                         Ther Activity                                 Gait Training                                 Modalities

## 2021-02-25 NOTE — PROGRESS NOTES
Assessment:  1  Rotator cuff tendinitis, right  Large joint arthrocentesis: R subacromial bursa     Patient Active Problem List   Diagnosis    Dyspepsia    Colon cancer screening    Irritable bowel syndrome with constipation           Plan      Return to physical therapy   Cortisone injection given the subacromial space   His biceps tendinitis has resolved since the last time he saw us  At this point he does have some mild weakness with infraspinatus resistance  The supraspinatus is normal Blue test is negative  My suspicion is he may have some tendinitis in his infraspinatus  I did tell him that we want to see how the next 4 weeks with therapy in this cortisone injection goes  If he comes back to see us in 4 weeks any still having problems were going to get an MRI to evaluate that tendon more closely  Subjective:     Patient ID:    Chief Complaint:Angelo Barba 46 y o  male      HPI      Patient comes in today with regards to his right shoulder  He reports a 50% improvement since last time we saw him about 6 weeks ago  He has been doing physical therapy  Was diagnosed with impingement and biceps tendinitis    He comes in today reports that his pain is improving but he still has some pain with certain activities per      The following portions of the patient's history were reviewed and updated as appropriate: allergies, current medications, past family history, past social history, past surgical history and problem list     All organ systems normal    Social History     Socioeconomic History    Marital status: /Civil Union     Spouse name: Not on file    Number of children: Not on file    Years of education: Not on file    Highest education level: Not on file   Occupational History    Not on file   Social Needs    Financial resource strain: Not on file    Food insecurity     Worry: Not on file     Inability: Not on file    Transportation needs     Medical: Not on file Non-medical: Not on file   Tobacco Use    Smoking status: Light Tobacco Smoker     Packs/day: 0 25    Smokeless tobacco: Never Used    Tobacco comment: cigars daily   Substance and Sexual Activity    Alcohol use: Yes     Frequency: 2-4 times a month     Comment: socially    Drug use: No    Sexual activity: Not on file   Lifestyle    Physical activity     Days per week: Not on file     Minutes per session: Not on file    Stress: Not on file   Relationships    Social connections     Talks on phone: Not on file     Gets together: Not on file     Attends Alevism service: Not on file     Active member of club or organization: Not on file     Attends meetings of clubs or organizations: Not on file     Relationship status: Not on file    Intimate partner violence     Fear of current or ex partner: Not on file     Emotionally abused: Not on file     Physically abused: Not on file     Forced sexual activity: Not on file   Other Topics Concern    Not on file   Social History Narrative    Not on file     Past Medical History:   Diagnosis Date    Arthritis     Constipation      Past Surgical History:   Procedure Laterality Date    COLONOSCOPY      ME ESOPHAGOGASTRODUODENOSCOPY TRANSORAL DIAGNOSTIC N/A 10/18/2018    Procedure: EGD AND COLONOSCOPY;  Surgeon: Zheng Aiken DO;  Location: [facility];   Service: Gastroenterology    UPPER GASTROINTESTINAL ENDOSCOPY       No Known Allergies  Current Outpatient Medications on File Prior to Visit   Medication Sig Dispense Refill    calcipotriene (DOVONOX) 0 005 % ointment Apply topically 2 (two) times a day Saturday and Sunday (Patient not taking: Reported on 1/21/2021) 60 g 6    Cetirizine HCl (ZYRTEC ALLERGY) 10 MG CAPS Take by mouth as needed       clobetasol (TEMOVATE) 0 05 % external solution APPLY TO AFFECTED AREA EVERY DAY 50 mL 5    clotrimazole (LOTRIMIN) 1 % cream Apply topically 2 (two) times a day 30 g 0    fluticasone (FLONASE ALLERGY RELIEF) 50 mcg/act nasal spray as needed       lisinopril (ZESTRIL) 10 mg tablet Take 1 tablet (10 mg total) by mouth daily 90 tablet 1    lubiprostone (AMITIZA) 24 mcg capsule Take 1 capsule (24 mcg total) by mouth 2 (two) times a day with meals 60 capsule 3    rosuvastatin (CRESTOR) 10 MG tablet TAKE 1 TABLET BY MOUTH EVERY DAY 90 tablet 1    sildenafil (VIAGRA) 50 MG tablet Take 1 tablet (50 mg total) by mouth daily as needed for erectile dysfunction 10 tablet 5    triamcinolone (KENALOG) 0 1 % ointment Apply topically 2 (two) times a day Monday thru Friday (Patient not taking: Reported on 1/21/2021) 80 g 6     No current facility-administered medications on file prior to visit  Objective:        Ortho Exam    Large joint arthrocentesis: R subacromial bursa  Universal Protocol:  Consent given by: patient    Supporting Documentation  Indications: pain   Procedure Details  Location: shoulder - R subacromial bursa  Needle size: 22 G  Approach: posterior  Medications administered: 2 mL bupivacaine 0 25 %; 1 mL lidocaine 1 %; 6 mg betamethasone acetate-betamethasone sodium phosphate 6 (3-3) mg/mL    Patient tolerance: patient tolerated the procedure well with no immediate complications                Portions of the record may have been created with voice recognition software   Occasional wrong word or "sound a like" substitutions may have occurred due to the inherent limitations of voice recognition software   Read the chart carefully and recognize, using context, where substitutions have occurred

## 2021-03-01 ENCOUNTER — OFFICE VISIT (OUTPATIENT)
Dept: DERMATOLOGY | Facility: CLINIC | Age: 51
End: 2021-03-01
Payer: COMMERCIAL

## 2021-03-01 DIAGNOSIS — L40.9 PSORIASIS: Primary | ICD-10-CM

## 2021-03-01 PROCEDURE — 96900 ACTINOTHERAPY UV LIGHT: CPT | Performed by: DERMATOLOGY

## 2021-03-01 NOTE — PROGRESS NOTES
PHOTOTHERAPY NURSE VISIT    Physical Exam   Diagnosis: Psoriasis   Anatomic location affected: Back, abdomen, chest, thighs, right shin, and ears   Severity: Mild   BSA: 40%    Plan:   Treatment Schedule: MW   Reaction from previous therapy: None   mJoules today: 857   Topical Applied: yes - mineral oil    Secondary Treatment: None      Based on a thorough discussion of this condition and the management approach to it (including a comprehensive discussion of the known risks, side effects and potential benefits of treatment), the patient (family) agrees to implement the following specific plan:    Patient wore appropriate eye protection, protective clothing to cover unaffected skin, approved footwear if needed   Topical was applied by nurse and patient     All questions were answered no complications reported   Patient's next scheduled appointment: 3/3/21

## 2021-03-02 ENCOUNTER — OFFICE VISIT (OUTPATIENT)
Dept: PHYSICAL THERAPY | Facility: REHABILITATION | Age: 51
End: 2021-03-02
Payer: COMMERCIAL

## 2021-03-02 DIAGNOSIS — M25.511 CHRONIC RIGHT SHOULDER PAIN: Primary | ICD-10-CM

## 2021-03-02 DIAGNOSIS — G89.29 CHRONIC RIGHT SHOULDER PAIN: Primary | ICD-10-CM

## 2021-03-02 PROCEDURE — 97110 THERAPEUTIC EXERCISES: CPT | Performed by: PHYSICAL THERAPIST

## 2021-03-02 PROCEDURE — 97112 NEUROMUSCULAR REEDUCATION: CPT | Performed by: PHYSICAL THERAPIST

## 2021-03-02 NOTE — PROGRESS NOTES
Daily Note     Today's date: 3/2/2021  Patient name: Lamine Giles  : 1970  MRN: [id number]  Referring provider: Kevin Abraham MD  Dx:   Encounter Diagnosis     ICD-10-CM    1  Right Shoulder Pain  M25 511     G89 29        Start Time: 1400  Stop Time: 1445  Total time in clinic (min): 45 minutes    Subjective: Decreased pain, still has some weakness with work tasks  Objective: See treatment diary below      Assessment: Tolerated treatment well  Patient would benefit from continued PT      Plan: Continue per plan of care        Manuals 1/25 1/28 2/3 2/8 2/11 2/15 2/22 2/25 3/2   GHJ inf/post G3-4 mobs KS KS KS KS KS KS KS     T/S Supine G5 mob  KS KS KS KS KS KS     ART LHB     KS       Neuro Re-Ed            KB [place] 90 deg carry   2x1 5' 7 5# 2x1 5' 7 5#  3x1' 8# 3x1' 8# 3x1' 8# 3x1' 8#   Sagital LHB biased Fly   [place] 5  3x8-10 [place] 5  3x8-10  [place] 5  3x8-10 [place]er 5  3x8-10 [place] 5  3x8-10    D2 Flexion [place]        3x10 5#    D1 Ext [place]        3x10 10#    BB IR/ER         2x60"   Phillips's         3x8 8#                           Ther Ex            [place] Row 3x10 3x10 3x10 btb 2x20 25# 2x20 25# 2x20 30# 2x20 30# 2x20 30# 2x20 30#   [place] LPDI 3x10 3x10 3x10 btb 2x20 15# 2x20 15# 2x20 15# 2x20 15# 2x20 15# 2x20 15#   Stanley ER        5#3x10 5#3x10   [place] 90/90 ER        2# 3x10 2# 3x10   [place] ER walkout         8# x4   [place] over row    3x10 8# 3x10 8#  3x10 15#     UBE     5'/5' 5'/5' 5'/5' 4'/4' 4'/4'                           Ther Activity                                    Gait Training                                    Modalities

## 2021-03-03 ENCOUNTER — OFFICE VISIT (OUTPATIENT)
Dept: DERMATOLOGY | Facility: CLINIC | Age: 51
End: 2021-03-03
Payer: COMMERCIAL

## 2021-03-03 DIAGNOSIS — L40.9 PSORIASIS: Primary | ICD-10-CM

## 2021-03-03 PROCEDURE — 96900 ACTINOTHERAPY UV LIGHT: CPT | Performed by: DERMATOLOGY

## 2021-03-03 NOTE — PROGRESS NOTES
PHOTOTHERAPY NURSE VISIT    Physical Exam   Diagnosis: Psoriasis   Anatomic location affected: Back, abdomen, chest, thighs, right shin, and ears   Severity: Mild   BSA: 40%    Plan:   Treatment Schedule: MW   Reaction from previous therapy: None   mJoules today: 857   Topical Applied: yes - mineral oil    Secondary Treatment: None      Based on a thorough discussion of this condition and the management approach to it (including a comprehensive discussion of the known risks, side effects and potential benefits of treatment), the patient (family) agrees to implement the following specific plan:    Patient wore appropriate eye protection, protective clothing to cover unaffected skin, approved footwear if needed   Topical was applied by nurse and patient     All questions were answered no complications reported   Patient's next scheduled appointment: 3/8/21

## 2021-03-04 ENCOUNTER — OFFICE VISIT (OUTPATIENT)
Dept: PHYSICAL THERAPY | Facility: REHABILITATION | Age: 51
End: 2021-03-04
Payer: COMMERCIAL

## 2021-03-04 DIAGNOSIS — G89.29 CHRONIC RIGHT SHOULDER PAIN: Primary | ICD-10-CM

## 2021-03-04 DIAGNOSIS — M25.511 CHRONIC RIGHT SHOULDER PAIN: Primary | ICD-10-CM

## 2021-03-04 PROCEDURE — 97110 THERAPEUTIC EXERCISES: CPT | Performed by: PHYSICAL THERAPIST

## 2021-03-04 PROCEDURE — 97112 NEUROMUSCULAR REEDUCATION: CPT | Performed by: PHYSICAL THERAPIST

## 2021-03-04 NOTE — PROGRESS NOTES
Daily Note     Today's date: 3/4/2021  Patient name: Lamine Giles  : 1970  MRN: [id number]  Referring provider: Kevin Abraham MD  Dx:   Encounter Diagnosis     ICD-10-CM    1  Right Shoulder Pain  M25 511     G89 29        Start Time: 1440  Stop Time: 1535  Total time in clinic (min): 55 minutes    Subjective: Intermittent anterior SH pain with pulling      Objective: See treatment diary below      Assessment: Tolerated treatment well  Patient would benefit from continued PT Tolerated increased load without increased pain  Added unilateral row and KB halo for multiplanar motor control  1:1 with Minh Foil for 55mins  Plan: Continue per plan of care        Manuals 2/11 2/15 2/22 2/25 3/2 3/4   GHJ inf/post G3-4 mobs KS KS KS      T/S Supine G5 mob KS KS KS      ART LHB KS        Neuro Re-Ed         KB [place] 90 deg carry  3x1' 8# 3x1' 8# 3x1' 8# 3x1' 8# 3x1' 10#   Sagital LHB biased Fly  [place] 5  3x8-10 [place] 5  3x8-10 [place] 5  3x8-10     D2 Flexion [place]    3x10 5#     D1 Ext [place]    3x10 10#     BB IR/ER     2x60"    Phillips's     3x8 8# 3x8 8#   UL Row kneeling      3x10 25#            Ther Ex         [place] Row 2x20 25# 2x20 30# 2x20 30# 2x20 30# 2x20 30# 2x20 30#   [place] LPDI 2x20 15# 2x20 15# 2x20 15# 2x20 15# 2x20 15# 2x20 15#   [place] ER    5#3x10 5#3x10 8#3x10   [place] 90/90 ER    2# 3x10 2# 3x10 2# 3x10   [place] ER walkout     8# x4 10# x4   UBE 5'/5' 5'/5' 5'/5' 4'/4' 4'/4' 4'/4'                     Ther Activity                           Gait Training                           Modalities

## 2021-03-08 ENCOUNTER — OFFICE VISIT (OUTPATIENT)
Dept: DERMATOLOGY | Facility: CLINIC | Age: 51
End: 2021-03-08
Payer: COMMERCIAL

## 2021-03-08 DIAGNOSIS — L40.9 PSORIASIS: Primary | ICD-10-CM

## 2021-03-08 PROCEDURE — 96910 PHOTCHMTX TAR&UVB/PTRLTM&UVB: CPT | Performed by: DERMATOLOGY

## 2021-03-08 NOTE — PROGRESS NOTES
PHOTOTHERAPY NURSE VISIT    Physical Exam   Diagnosis: Psoriasis    Anatomic location affected: Back, abdomen, chest, thighs, right shin, and ears    Severity: Mild   BSA: 40%    Plan:   Treatment Schedule: MW   Reaction from previous therapy: none   mJoules today: 859   Topical Applied: yes - mineral oil     Secondary Treatment: none      Based on a thorough discussion of this condition and the management approach to it (including a comprehensive discussion of the known risks, side effects and potential benefits of treatment), the patient (family) agrees to implement the following specific plan:    Patient wore appropriate eye protection, protective clothing to cover unaffected skin, approved footwear if needed     Topical was applied by nurse   All questions were answered no complications reported   Patient's next scheduled appointment: 03/10/2021

## 2021-03-09 ENCOUNTER — OFFICE VISIT (OUTPATIENT)
Dept: PHYSICAL THERAPY | Facility: REHABILITATION | Age: 51
End: 2021-03-09
Payer: COMMERCIAL

## 2021-03-09 DIAGNOSIS — M25.511 CHRONIC RIGHT SHOULDER PAIN: Primary | ICD-10-CM

## 2021-03-09 DIAGNOSIS — G89.29 CHRONIC RIGHT SHOULDER PAIN: Primary | ICD-10-CM

## 2021-03-09 PROCEDURE — 97112 NEUROMUSCULAR REEDUCATION: CPT | Performed by: PHYSICAL THERAPIST

## 2021-03-09 PROCEDURE — 97110 THERAPEUTIC EXERCISES: CPT | Performed by: PHYSICAL THERAPIST

## 2021-03-09 NOTE — PROGRESS NOTES
Daily Note     Today's date: 3/9/2021  Patient name: Mercedes Leon  : 1970  MRN: [id number]  Referring provider: Katlin Amaya MD  Dx:   Encounter Diagnosis     ICD-10-CM    1  Right Shoulder Pain  M25 511     G89 29        Start Time: 1445  Stop Time: 1540  Total time in clinic (min): 55 minutes    Subjective: Feeling better with work, continued intermittent pain at night  Objective: See treatment diary below      Assessment: Tolerated treatment well  Patient would benefit from continued PT      Plan: Continue per plan of care        Manuals 2/11 2/15 2/22 2/25 3/2 3/4 3/9   GHJ inf/post G3-4 mobs KS KS KS                                     T/S Supine G5 mob KS KS KS       ART LHB KS         Neuro Re-Ed          KB [place] 90 deg carry  3x1' 8# 3x1' 8# 3x1' 8# 3x1' 8# 3x1' 10# 3x1' 10#   Sagital LHB biased Fly  [place] 5  3x8-10 [place] 5  3x8-10 [place] 5  3x8-10      D2 Flexion [place]    3x10 5#      D1 Ext [place]    3x10 10#      BB IR/ER     2x60"     Phillips's     3x8 8# 3x8 8# 3x8 10#   UL Row kneeling      3x10 25# 3x10 25#   RC Press series       3x10 3#   Ther Ex          [place] Row 2x20 25# 2x20 30# 2x20 30# 2x20 30# 2x20 30# 2x20 30# 2x20 30#   [place] LPDI 2x20 15# 2x20 15# 2x20 15# 2x20 15# 2x20 15# 2x20 15# 2x20 15#   [place] ER    5#3x10 5#3x10 8#3x10 8#3x10   [place] 90/90 ER    2# 3x10 2# 3x10 2# 3x10 2# 3x10   [place] ER walkout     8# x4 10# x4 10# x4   UBE 5'/5' 5'/5' 5'/5' 4'/4' 4'/4' 4'/4' 6'                       Ther Activity                              Gait Training                              Modalities

## 2021-03-10 ENCOUNTER — OFFICE VISIT (OUTPATIENT)
Dept: DERMATOLOGY | Facility: CLINIC | Age: 51
End: 2021-03-10
Payer: COMMERCIAL

## 2021-03-10 DIAGNOSIS — L40.9 PSORIASIS: Primary | ICD-10-CM

## 2021-03-10 PROCEDURE — 96910 PHOTCHMTX TAR&UVB/PTRLTM&UVB: CPT | Performed by: DERMATOLOGY

## 2021-03-10 NOTE — PROGRESS NOTES
PHOTOTHERAPY NURSE VISIT    Physical Exam   Diagnosis: Psoriasis    Anatomic location affected: Back, abdomen, chest, thighs, right shin, and ears   Severity: Mild   BSA: 40%    Plan:   Treatment Schedule: MW   Reaction from previous therapy: none   mJoules today: 854   Topical Applied: yes - mineral oil    Secondary Treatment: none      Based on a thorough discussion of this condition and the management approach to it (including a comprehensive discussion of the known risks, side effects and potential benefits of treatment), the patient (family) agrees to implement the following specific plan:    Patient wore appropriate eye protection, protective clothing to cover unaffected skin, approved footwear if needed     Topical was applied by nurse   All questions were answered no complications reported   Patient's next scheduled appointment: 03/15/2021

## 2021-03-11 DIAGNOSIS — K58.1 IRRITABLE BOWEL SYNDROME WITH CONSTIPATION: ICD-10-CM

## 2021-03-11 RX ORDER — LUBIPROSTONE 24 UG/1
CAPSULE, GELATIN COATED ORAL
Qty: 60 CAPSULE | Refills: 11 | Status: SHIPPED | OUTPATIENT
Start: 2021-03-11 | End: 2022-03-17

## 2021-03-15 ENCOUNTER — OFFICE VISIT (OUTPATIENT)
Dept: DERMATOLOGY | Facility: CLINIC | Age: 51
End: 2021-03-15
Payer: COMMERCIAL

## 2021-03-15 DIAGNOSIS — L40.9 PSORIASIS: Primary | ICD-10-CM

## 2021-03-15 PROCEDURE — 96910 PHOTCHMTX TAR&UVB/PTRLTM&UVB: CPT | Performed by: DERMATOLOGY

## 2021-03-15 NOTE — PROGRESS NOTES
PHOTOTHERAPY NURSE VISIT    Physical Exam   Diagnosis: Psoriasis   Anatomic location affected: Back, abdomen, chest, thighs, right shin, and ears   Severity: Mild   BSA: 40%    Plan:   Treatment Schedule: MW   Reaction from previous therapy: One new spot on chest  Patient states he applied triamcinolone to new spot   mJoules today: 863   Topical Applied: yes - mineral oil    Secondary Treatment: None      Based on a thorough discussion of this condition and the management approach to it (including a comprehensive discussion of the known risks, side effects and potential benefits of treatment), the patient (family) agrees to implement the following specific plan:    Patient wore appropriate eye protection, protective clothing to cover unaffected skin, approved footwear if needed   Topical was applied by nurse and patient     All questions were answered no complications reported   Patient's next scheduled appointment: 3/17/21

## 2021-03-16 ENCOUNTER — OFFICE VISIT (OUTPATIENT)
Dept: PHYSICAL THERAPY | Facility: REHABILITATION | Age: 51
End: 2021-03-16
Payer: COMMERCIAL

## 2021-03-16 DIAGNOSIS — M25.511 CHRONIC RIGHT SHOULDER PAIN: Primary | ICD-10-CM

## 2021-03-16 DIAGNOSIS — G89.29 CHRONIC RIGHT SHOULDER PAIN: Primary | ICD-10-CM

## 2021-03-16 PROCEDURE — 97110 THERAPEUTIC EXERCISES: CPT

## 2021-03-16 PROCEDURE — 97112 NEUROMUSCULAR REEDUCATION: CPT

## 2021-03-16 NOTE — PROGRESS NOTES
Daily Note     Today's date: 3/16/2021  Patient name: Mercedes Leon  : 1970  MRN: [id number]  Referring provider: Katlin Amaya MD  Dx:   Encounter Diagnosis     ICD-10-CM    1  Right Shoulder Pain  M25 511     G89 29                   Subjective: Pt reports improvement overall upon arrival, states he was able increase activity this weekend with no ill effects  Objective: See treatment diary below      Assessment: Tolerated treatment well  Patient would benefit from continued PT  Pt remains challenged with some of the ER exercises, is doing well with strength and stability with other exercises  Pt  able to complete all exercises with no increase in pain during or after session  Pt would benefit from continued PT to focus on current strength deficits and restore PLOF  Pt 1:1 with PTA [id number], IEP for remainder  Plan: Continue per plan of care        Manuals 2/25 3/2 3/4 3/9 3/16   GHJ inf/post G3-4 mobs                                T/S Supine G5 mob        ART LHB        Neuro Re-Ed        KB [place] 90 deg carry 3x1' 8# 3x1' 8# 3x1' 10# 3x1' 10# 3x1' 10#   Sagital LHB biased Fly [place] 5  3x8-10       D2 Flexion [place] 3x10 5#       D1 Ext [place] 3x10 10#       BB IR/ER  2x60"      Phillips's  3x8 8# 3x8 8# 3x8 10# 3x10 10#   UL Row kneeling   3x10 25# 3x10 25# 3x10 25#   RC Press series    3x10 3#    Ther Ex        [place] Row 2x20 30# 2x20 30# 2x20 30# 2x20 30# 2x20 30#   [place] LPD 2x20 15# 2x20 15# 2x20 15# 2x20 15# 2x15 25#   Stan[place] ER 5#3x10 5#3x10 8#3x10 8#3x10 3x10 8#   [place] 90/90 ER 2# 3x10 2# 3x10 2# 3x10 2# 3x10 3x10 2#   [place] ER walkout  8# x4 10# x4 10# x4 3x10 10#   UBE 4'/4' 4'/4' 4'/4' 6' 4/4                   Ther Activity                        Gait Training                        Modalities

## 2021-03-17 ENCOUNTER — OFFICE VISIT (OUTPATIENT)
Dept: DERMATOLOGY | Facility: CLINIC | Age: 51
End: 2021-03-17
Payer: COMMERCIAL

## 2021-03-17 DIAGNOSIS — L40.9 PSORIASIS: Primary | ICD-10-CM

## 2021-03-17 PROCEDURE — 96900 ACTINOTHERAPY UV LIGHT: CPT | Performed by: DERMATOLOGY

## 2021-03-17 NOTE — PROGRESS NOTES
PHOTOTHERAPY NURSE VISIT    Physical Exam   Diagnosis: Psoriasis   Anatomic location affected: Back, abdomen, chest, thighs, right shin, and ears   Severity: Mild   BSA: 40%    Plan:   Treatment Schedule: MW   Reaction from previous therapy: None   mJoules today: 845   Topical Applied: yes - mineral oil    Secondary Treatment: None      Based on a thorough discussion of this condition and the management approach to it (including a comprehensive discussion of the known risks, side effects and potential benefits of treatment), the patient (family) agrees to implement the following specific plan:    Patient wore appropriate eye protection, protective clothing to cover unaffected skin, approved footwear if needed   Topical was applied by nurse and patient     All questions were answered no complications reported   Patient's next scheduled appointment: 3/22/21

## 2021-03-18 ENCOUNTER — OFFICE VISIT (OUTPATIENT)
Dept: PHYSICAL THERAPY | Facility: REHABILITATION | Age: 51
End: 2021-03-18
Payer: COMMERCIAL

## 2021-03-18 DIAGNOSIS — G89.29 CHRONIC RIGHT SHOULDER PAIN: Primary | ICD-10-CM

## 2021-03-18 DIAGNOSIS — M25.511 CHRONIC RIGHT SHOULDER PAIN: Primary | ICD-10-CM

## 2021-03-18 PROCEDURE — 97110 THERAPEUTIC EXERCISES: CPT | Performed by: PHYSICAL THERAPIST

## 2021-03-18 PROCEDURE — 97112 NEUROMUSCULAR REEDUCATION: CPT | Performed by: PHYSICAL THERAPIST

## 2021-03-18 NOTE — PROGRESS NOTES
Daily Note     Today's date: 3/18/2021  Patient name: Cayla Pepe  : 1970  MRN: [id number]  Referring provider: Reji Nielson MD  Dx:   Encounter Diagnosis     ICD-10-CM    1  Right Shoulder Pain  M25 511     G89 29        Start Time: 1645  Stop Time: 2305  Total time in clinic (min): 38 minutes    Subjective: Feeling much better, no pain while sleeping  Objective: See treatment diary below      Assessment: Tolerated treatment well  Patient would benefit from continued PT      Plan: Continue per plan of care        Manuals 2/25 3/2 3/4 3/9 3/16 3/18   Neuro Re-Ed         KB [place] 90 deg carry 3x1' 8# 3x1' 8# 3x1' 10# 3x1' 10# 3x1' 10# 3x1' 10#   Sagital LHB biased Fly [place] 5  3x8-10        D2 Flexion [place] 3x10 5#        D1 Ext [place] 3x10 10#        BB IR/ER  2x60"       Phillips's  3x8 8# 3x8 8# 3x8 10# 3x10 10# 3x10 10#   UL Row kneeling   3x10 25# 3x10 25# 3x10 25# 3x10 25#   RC Press series    3x10 3#     Ther Ex         [place] Row 2x20 30# 2x20 30# 2x20 30# 2x20 30# 2x20 30# 2x20 35#   [place] LPD 2x20 15# 2x20 15# 2x20 15# 2x20 15# 2x15 25# 2x20 30#   [place] ER 5#3x10 5#3x10 8#3x10 8#3x10 3x10 8# 3x10 10#   [place] 90/90 ER 2# 3x10 2# 3x10 2# 3x10 2# 3x10 3x10 2# 3x10 2#   [place] ER walkout  8# x4 10# x4 10# x4 3x10 10# 3x10 15#   UBE 4'/4' 4'/4' 4'/4' 6' 4/4 4/4                     Ther Activity                           Gait Training                           Modalities

## 2021-03-22 ENCOUNTER — OFFICE VISIT (OUTPATIENT)
Dept: DERMATOLOGY | Facility: CLINIC | Age: 51
End: 2021-03-22
Payer: COMMERCIAL

## 2021-03-22 DIAGNOSIS — L40.9 PSORIASIS: Primary | ICD-10-CM

## 2021-03-22 PROCEDURE — 96900 ACTINOTHERAPY UV LIGHT: CPT | Performed by: DERMATOLOGY

## 2021-03-22 NOTE — PROGRESS NOTES
PHOTOTHERAPY NURSE VISIT    Physical Exam   Diagnosis: Psoriasis    Anatomic location affected: Back, abdomen, chest, thighs, right shin, and ears    Severity: Mild   BSA: 40%    Plan:   Treatment Schedule: MW   Reaction from previous therapy: none   mJoules today: 850   Topical Applied: yes - mineral oil    Secondary Treatment: none      Based on a thorough discussion of this condition and the management approach to it (including a comprehensive discussion of the known risks, side effects and potential benefits of treatment), the patient (family) agrees to implement the following specific plan:    Patient wore appropriate eye protection, protective clothing to cover unaffected skin, approved footwear if needed     Topical was applied by nurse and patient    All questions were answered no complications reported   Patient's next scheduled appointment: 03/24/2021

## 2021-03-23 ENCOUNTER — OFFICE VISIT (OUTPATIENT)
Dept: PHYSICAL THERAPY | Facility: REHABILITATION | Age: 51
End: 2021-03-23
Payer: COMMERCIAL

## 2021-03-23 DIAGNOSIS — M25.511 CHRONIC RIGHT SHOULDER PAIN: Primary | ICD-10-CM

## 2021-03-23 DIAGNOSIS — G89.29 CHRONIC RIGHT SHOULDER PAIN: Primary | ICD-10-CM

## 2021-03-23 PROCEDURE — 97112 NEUROMUSCULAR REEDUCATION: CPT

## 2021-03-23 PROCEDURE — 97110 THERAPEUTIC EXERCISES: CPT

## 2021-03-23 NOTE — PROGRESS NOTES
Daily Note     Today's date: 3/23/2021  Patient name: Cayla Pepe  : 1970  MRN: [id number]  Referring provider: Reji Nielson MD  Dx:   Encounter Diagnosis     ICD-10-CM    1  Right Shoulder Pain  M25 511     G89 29                   Subjective: Pt reports continued improvement, states he was able to do physical work over the weekend with no ill effects      Objective: See treatment diary below      Assessment: Tolerated treatment well  Patient exhibited good technique with therapeutic exercises  Pt  able to complete all exercises with no increase in pain during or after session  Pt has dr visit later this week, is agreeable to d/c to hep pending dr visit outcome  Reviewed HEP, pt understood  Pt  1:1 with PTA for entirety  Plan: D/C to HEP pending dr visit later this week       Manuals 2/25 3/2 3/4 3/9 3/16 3/18 3/23   Neuro Re-Ed          KB [place] 90 deg carry 3x1' 8# 3x1' 8# 3x1' 10# 3x1' 10# 3x1' 10# 3x1' 10# 3x1' 10#   Sagital LHB biased Fly [place] 5  3x8-10         D2 Flexion [place] 3x10 5#         D1 Ext [place] 3x10 10#         BB IR/ER  2x60"        Phillips's  3x8 8# 3x8 8# 3x8 10# 3x10 10# 3x10 10# 3x10 10#   UL Row kneeling   3x10 25# 3x10 25# 3x10 25# 3x10 25# 3x10 25#   RC Press series    3x10 3#      Ther Ex          [place] Row 2x20 30# 2x20 30# 2x20 30# 2x20 30# 2x20 30# 2x20 35# 2x20 35#   [place] LPD 2x20 15# 2x20 15# 2x20 15# 2x20 15# 2x15 25# 2x20 30# 2x20 30#   [place] ER 5#3x10 5#3x10 8#3x10 8#3x10 3x10 8# 3x10 10# 3x10 10#   [place] 90/90 ER 2# 3x10 2# 3x10 2# 3x10 2# 3x10 3x10 2# 3x10 2# 3x10 2#   [place] ER walkout  8# x4 10# x4 10# x4 3x10 10# 3x10 15# 3x10 15#   UBE 4'/4' 4'/4' 4'/4' 6'  L3                       Ther Activity                              Gait Training                              Modalities

## 2021-03-24 ENCOUNTER — OFFICE VISIT (OUTPATIENT)
Dept: DERMATOLOGY | Facility: CLINIC | Age: 51
End: 2021-03-24
Payer: COMMERCIAL

## 2021-03-24 DIAGNOSIS — L40.9 PSORIASIS: Primary | ICD-10-CM

## 2021-03-24 PROCEDURE — 96900 ACTINOTHERAPY UV LIGHT: CPT | Performed by: DERMATOLOGY

## 2021-03-24 NOTE — PROGRESS NOTES
PHOTOTHERAPY NURSE VISIT    Physical Exam   Diagnosis: Psoriasis    Anatomic location affected: Back, abdomen, chest, thighs, right shin, and ears    Severity: Mild   BSA: 40%    Plan:   Treatment Schedule: MW   Reaction from previous therapy: none   mJoules today: 853   Topical Applied: yes - mineral oil    Secondary Treatment: none      Based on a thorough discussion of this condition and the management approach to it (including a comprehensive discussion of the known risks, side effects and potential benefits of treatment), the patient (family) agrees to implement the following specific plan:    Patient wore appropriate eye protection, protective clothing to cover unaffected skin, approved footwear if needed     Topical was applied by nurse and patient    All questions were answered no complications reported   Patient's next scheduled appointment: Patient has received his own phototherapy [place] and will be continuing his treatments at home ordered by Dr Evelyn Field

## 2021-03-25 ENCOUNTER — OFFICE VISIT (OUTPATIENT)
Dept: OBGYN CLINIC | Facility: CLINIC | Age: 51
End: 2021-03-25
Payer: COMMERCIAL

## 2021-03-25 VITALS
DIASTOLIC BLOOD PRESSURE: 72 MMHG | WEIGHT: 195 LBS | SYSTOLIC BLOOD PRESSURE: 130 MMHG | HEIGHT: 70 IN | BODY MASS INDEX: 27.92 KG/M2 | HEART RATE: 83 BPM

## 2021-03-25 DIAGNOSIS — M25.511 ACUTE PAIN OF RIGHT SHOULDER: Primary | ICD-10-CM

## 2021-03-25 PROCEDURE — 99212 OFFICE O/P EST SF 10 MIN: CPT | Performed by: ORTHOPAEDIC SURGERY

## 2021-03-25 PROCEDURE — 4004F PT TOBACCO SCREEN RCVD TLK: CPT | Performed by: ORTHOPAEDIC SURGERY

## 2021-03-25 PROCEDURE — 3008F BODY MASS INDEX DOCD: CPT | Performed by: ORTHOPAEDIC SURGERY

## 2021-03-25 NOTE — PROGRESS NOTES
Assessment:  1  Acute pain of right shoulder       Patient Active Problem List   Diagnosis    Dyspepsia    Colon cancer screening    Irritable bowel syndrome with constipation    Acute pain of right shoulder           Plan       activity as tolerated follow up on as-needed basis  We did talk about an MRI but being that he reports being 100% improved no further intervention necessary at this time  Patient also inquired about his back I recommended Dr Lorna Martin or Dr Amy Dacosta            Subjective:     Patient ID:    Chief Luzma STEVENSON Kathleen Barba 46 y o  male      HPI     patient comes in today with regards to his right shoulder  He was initially seen suspect that he had biceps tendinitis and subacromial irritation  He was recommended cortisone injection but he wants to hold off on that and do physical therapy 1st   He performed therapy  He was doing better but when he came back he would then request to have the cortisone injection  He had a cortisone injection continued with physical therapy and comes back today reports that he has 100% improved        The following portions of the patient's history were reviewed and updated as appropriate: allergies, current medications, past family history, past social history, past surgical history and problem list     All organ systems normal    Social History     Socioeconomic History    Marital status: /Civil Union     Spouse name: Not on file    Number of children: Not on file    Years of education: Not on file    Highest education level: Not on file   Occupational History    Not on file   Social Needs    Financial resource strain: Not on file    Food insecurity     Worry: Not on file     Inability: Not on file   [employer] needs     Medical: Not on file     Non-medical: Not on file   Tobacco Use    Smoking status: Light Tobacco Smoker     Packs/day: 0 25    Smokeless tobacco: Never Used    Tobacco comment: cigars daily   Substance and Sexual Activity    Alcohol use: Yes     Frequency: 2-4 times a month     Comment: socially    Drug use: No    Sexual activity: Not on file   Lifestyle    Physical activity     Days per week: Not on file     Minutes per session: Not on file    Stress: Not on file   Relationships    Social connections     Talks on phone: Not on file     Gets together: Not on file     Attends [employer] service: Not on file     Active member of club or organization: Not on file     Attends meetings of clubs or organizations: Not on file     Relationship status: Not on file    Intimate partner violence     Fear of current or ex partner: Not on file     Emotionally abused: Not on file     Physically abused: Not on file     Forced sexual activity: Not on file   Other Topics Concern    Not on file   Social History Narrative    Not on file     Past Medical History:   Diagnosis Date    Arthritis     Constipation      Past Surgical History:   Procedure Laterality Date    COLONOSCOPY      NV ESOPHAGOGASTRODUODENOSCOPY TRANSORAL DIAGNOSTIC N/A 10/18/2018    Procedure: EGD AND COLONOSCOPY;  Surgeon: Zheng Aiken DO;  Location: [facility];   Service: Gastroenterology    UPPER GASTROINTESTINAL ENDOSCOPY       No Known Allergies  Current Outpatient Medications on File Prior to Visit   Medication Sig Dispense Refill    Amitiza 24 MCG capsule TAKE 1 CAPSULE (24 MCG TOTAL) BY MOUTH 2 (TWO) TIMES A DAY WITH MEALS 60 capsule 11    calcipotriene (DOVONOX) 0 005 % ointment Apply topically 2 (two) times a day Saturday and Sunday (Patient not taking: Reported on 1/21/2021) 60 g 6    Cetirizine HCl (ZYRTEC ALLERGY) 10 MG CAPS Take by mouth as needed       clobetasol (TEMOVATE) 0 05 % external solution APPLY TO AFFECTED AREA EVERY DAY 50 mL 5    clotrimazole (LOTRIMIN) 1 % cream Apply topically 2 (two) times a day 30 g 0    fluticasone (FLONASE ALLERGY RELIEF) 50 mcg/act nasal spray as needed       lisinopril (ZESTRIL) 10 mg tablet Take 1 tablet (10 mg total) by mouth daily 90 tablet 1    rosuvastatin (CRESTOR) 10 MG tablet TAKE 1 TABLET BY MOUTH EVERY DAY 90 tablet 1    sildenafil (VIAGRA) 50 MG tablet Take 1 tablet (50 mg total) by mouth daily as needed for erectile dysfunction 10 tablet 5    triamcinolone (KENALOG) 0 1 % ointment Apply topically 2 (two) times a day Monday thru Friday (Patient not taking: Reported on 1/21/2021) 80 g 6     No current facility-administered medications on file prior to visit  Objective:        Ortho Exam      Range of motion within normal limits no pain with range of motion but does have some mild limitations in internal rotation which I instructed him on internal rotation stretch  Portions of the record may have been created with voice recognition software   Occasional wrong word or "sound a like" substitutions may have occurred due to the inherent limitations of voice recognition software   Read the chart carefully and recognize, using context, where substitutions have occurred

## 2021-04-05 DIAGNOSIS — I10 ESSENTIAL HYPERTENSION: ICD-10-CM

## 2021-04-05 RX ORDER — LISINOPRIL 10 MG/1
TABLET ORAL
Qty: 30 TABLET | Refills: 5 | Status: SHIPPED | OUTPATIENT
Start: 2021-04-05 | End: 2021-10-11

## 2021-06-01 ENCOUNTER — TRANSCRIBE ORDERS (OUTPATIENT)
Dept: NEUROSURGERY | Facility: CLINIC | Age: 51
End: 2021-06-01

## 2021-06-01 DIAGNOSIS — M54.50 LOWER BACK PAIN: Primary | ICD-10-CM

## 2021-06-02 ENCOUNTER — OFFICE VISIT (OUTPATIENT)
Dept: NEUROSURGERY | Facility: CLINIC | Age: 51
End: 2021-06-02
Payer: COMMERCIAL

## 2021-06-02 VITALS
TEMPERATURE: 99 F | HEART RATE: 82 BPM | RESPIRATION RATE: 18 BRPM | SYSTOLIC BLOOD PRESSURE: 127 MMHG | HEIGHT: 70 IN | DIASTOLIC BLOOD PRESSURE: 72 MMHG | BODY MASS INDEX: 27.92 KG/M2 | WEIGHT: 195 LBS

## 2021-06-02 DIAGNOSIS — M54.50 LOWER BACK PAIN: ICD-10-CM

## 2021-06-02 DIAGNOSIS — M54.16 LUMBAR RADICULOPATHY: ICD-10-CM

## 2021-06-02 DIAGNOSIS — R10.32 LEFT GROIN PAIN: Primary | ICD-10-CM

## 2021-06-02 PROCEDURE — 4004F PT TOBACCO SCREEN RCVD TLK: CPT | Performed by: PHYSICIAN ASSISTANT

## 2021-06-02 PROCEDURE — 3008F BODY MASS INDEX DOCD: CPT | Performed by: PHYSICIAN ASSISTANT

## 2021-06-02 PROCEDURE — 99203 OFFICE O/P NEW LOW 30 MIN: CPT | Performed by: PHYSICIAN ASSISTANT

## 2021-06-02 NOTE — PROGRESS NOTES
Patient ID: Marisela Montgomery is a 46 y o  male  Diagnoses and all orders for this visit:    Left groin pain  -     Ambulatory referral to Physical Therapy; Future  -     MRI lumbar spine without contrast; Future    Lower back pain  -     Ambulatory referral to Neurosurgery  -     Ambulatory referral to Physical Therapy; Future  -     MRI lumbar spine without contrast; Future    Lumbar radiculopathy  -     Ambulatory referral to Physical Therapy; Future  -     MRI lumbar spine without contrast; Future          Assessment/Plan:    Very pleasant 64-year-old male, self-referral has complaint of 1 year of low back pain particularly on the left with some radiation to the groin at times as well as to the left with hip at times  He is unaware of any specific sentinel event preceded the onset of these symptoms  He denies bowel or bladder incontinence, motor or sensory difficulties in the lower extremities, gait or balance disturbance  He has had no conservative management or testing to date  He is taking no regular medications for this condition  He has a history of irritable bowel syndrome well as GERD  He does have plain films of his lumbar spine, 5/8/20 these reveal chronic degenerative changes with some disc narrowing, in addition there was a mild S shaped scoliosis       On examination today he is awake, alert, oriented x3, motor examination of the lower extremities was 5 x 5 for power, in a standing position he was able easily lift on his heel and toes, he is able to bend at the waist and touch his toes  Reflexes patella Achilles are +2 and symmetric, sensation sharp, dull and vibratory intact throughout  Straight leg raise was negative when seated bilaterally, Yves's test was also negative bilaterally  At this juncture further evaluation is advised however with no recent conservative management a course of physical therapy is advised initially  An order has been placed  Further follow-up with Neurosurgery is planned after MRI lumbar spine without contrast       These findings, impressions and recommendations were reviewed in great detail with pain        Return in about 6 weeks (around 7/14/2021)  Chief Complaint  Left low back pain, radiation to the groin at times, also to the left hip at times  HPI       The following portions of the patient's history were reviewed and updated as appropriate: allergies, current medications, past family history, past medical history, past social history, past surgical history and problem list     Review of Systems   Constitutional: Negative  HENT: Negative  Eyes: Negative  Respiratory: Negative  Cardiovascular: Negative  H/o Hyperlipidemia   Gastrointestinal: Positive for constipation (due to IBS)  H/o IBS     Endocrine: Negative  Genitourinary: Negative  Musculoskeletal: Positive for back pain (B/l LBP radiates to left hip and groin area x 1yr  Prolonged sitting/bending increases his pain)  PT --- NONE   Pain Man -- NONE   Takes Ibuprofen  600 mg 1 daily-- mild relief   Skin: Negative  Allergic/Immunologic: Negative  Neurological: Negative  Hematological: Negative  Psychiatric/Behavioral: Negative for sleep disturbance  All other systems reviewed and are negative  Objective:    Physical Exam  Constitutional:       Appearance: He is well-developed  HENT:      Head: Normocephalic and atraumatic  Eyes:      Pupils: Pupils are equal, round, and reactive to light  Cardiovascular:      Rate and Rhythm: Normal rate  Pulmonary:      Effort: Pulmonary effort is normal       Breath sounds: Normal breath sounds  Skin:     General: Skin is warm and dry  Neurological:      Mental Status: He is alert and oriented to person, place, and time  Neurologic Exam     Mental Status   Oriented to person, place, and time       Cranial Nerves     CN III, IV, VI   Pupils are equal, round, and reactive to light  LUMBAR SPINE   5/8/20     INDICATION:  L40 9: Psoriasis, unspecified      COMPARISON:  8/20/2012, MRI lumbar spine 9/6/2012     VIEWS:  XR SPINE LUMBAR MINIMUM 4 VIEWS NON INJURY  Images: 5     FINDINGS:     There are 5 non rib bearing lumbar vertebral bodies  There is hypertrophy of the left L5 transverse process noted      There is no evidence of acute fracture or destructive osseous lesion  There is mild, chronic superior endplate deformity of F53      Mild reverse S-shaped thoracolumbar scoliosis  There is mild rightward translation of L3 in relation to L2 and L4      Degenerative disc disease most notably at L2-3 and L3-4 with disc space narrowing and marginal endplate osteophytes  Mild narrowing at L5-S1      The pedicles appear intact      Small peripheral pelvic calcification(s) on the left which may represent calcified phleboliths      IMPRESSION:     No acute osseous abnormality        Degenerative changes and mild scoliosis as described  LUMBAR SPINE     INDICATION:  L40 9: Psoriasis, unspecified      COMPARISON:  8/20/2012, MRI lumbar spine 9/6/2012     VIEWS:  XR SPINE LUMBAR MINIMUM 4 VIEWS NON INJURY  Images: 5     FINDINGS:     There are 5 non rib bearing lumbar vertebral bodies  There is hypertrophy of the left L5 transverse process noted      There is no evidence of acute fracture or destructive osseous lesion  There is mild, chronic superior endplate deformity of B66      Mild reverse S-shaped thoracolumbar scoliosis  There is mild rightward translation of L3 in relation to L2 and L4      Degenerative disc disease most notably at L2-3 and L3-4 with disc space narrowing and marginal endplate osteophytes    Mild narrowing at L5-S1      The pedicles appear intact      Small peripheral pelvic calcification(s) on the left which may represent calcified phleboliths      IMPRESSION:     No acute osseous abnormality        Degenerative changes and mild scoliosis as described

## 2021-06-02 NOTE — PATIENT INSTRUCTIONS
Proceed for course of physical therapy as discussed, the orders have been placed  Proceed for MRI after physical therapy should symptoms persist   The order has been placed  Further follow-up with Neurosurgery for management recommendations after imaging is complete

## 2021-06-06 DIAGNOSIS — E78.2 MIXED HYPERLIPIDEMIA: ICD-10-CM

## 2021-06-09 ENCOUNTER — EVALUATION (OUTPATIENT)
Dept: PHYSICAL THERAPY | Facility: REHABILITATION | Age: 51
End: 2021-06-09
Payer: COMMERCIAL

## 2021-06-09 DIAGNOSIS — M54.50 LOW BACK PAIN, UNSPECIFIED BACK PAIN LATERALITY, UNSPECIFIED CHRONICITY, UNSPECIFIED WHETHER SCIATICA PRESENT: ICD-10-CM

## 2021-06-09 DIAGNOSIS — M54.16 LUMBAR RADICULOPATHY: ICD-10-CM

## 2021-06-09 DIAGNOSIS — M54.50 LOWER BACK PAIN: ICD-10-CM

## 2021-06-09 DIAGNOSIS — R10.32 LEFT GROIN PAIN: ICD-10-CM

## 2021-06-09 PROCEDURE — 97161 PT EVAL LOW COMPLEX 20 MIN: CPT | Performed by: PHYSICAL THERAPIST

## 2021-06-09 RX ORDER — ROSUVASTATIN CALCIUM 10 MG/1
TABLET, COATED ORAL
Qty: 30 TABLET | Refills: 5 | Status: SHIPPED | OUTPATIENT
Start: 2021-06-09 | End: 2021-12-16

## 2021-06-09 NOTE — PROGRESS NOTES
PT Evaluation     Today's date: 2021  Patient name: Collin Maza  : 1970  MRN: [id number]  Referring provider: Irvin Pineda PA-C  Dx:   Encounter Diagnosis     ICD-10-CM    1  Lower back pain  M54 5 Ambulatory referral to Physical Therapy   2  Left groin pain  R10 32 Ambulatory referral to Physical Therapy   3  Lumbar radiculopathy  M54 16 Ambulatory referral to Physical Therapy                  Assessment  Assessment details: Patient is a 46 y o  male who presents with s/s consistent with chronic on chronic low back strain  Patient presents to evaluation with pain, decreased range of motion, decreased strength and decreased tolerance to activity  Most limitations with lifting and bending forward at this time with decreased core activation and control  Weakness with hips most so gluteal group with improper firing patterns as he overuses his hamstrings more so  Slight hip tightness and paraspinal tightness t/o which is likely further contributing to discomfort and chronic strain t/o the area  Discussed risks, benefits, and alternatives to treatment, and answered all patient questions to patient satisfaction  Initiated HEP in which pt appeared to understand and plans on performing  Patient would benefit from skilled PT services to address impairments stated above  These deficits are limiting patient's ability to perform all of his work duties including bending, lifting, and twisting; as well as getting up and walking around after sitting for longer periods of time  Patient appears motivated, agrees with the POC, and is a good candidate for skilled PT at this time   Thank you for the referral     Impairments: abnormal muscle firing, abnormal or restricted ROM, abnormal movement, activity intolerance, impaired physical strength, lacks appropriate home exercise program, pain with function, poor posture  and poor body mechanics    Symptom irritability: lowUnderstanding of Dx/Px/POC: good   Prognosis: good    Goals  Impairment Goals:   - In 6 weeks, pt will report pain no greater than a 1-2/10 or less in low back with all ADLs and work tasks  - In 6 weeks, pt will improve B hip strength to a 4 to 4+/5 or better to facilitate improve tolerance to all lifting tasks  - In 6 weeks, pt will demonstrate improved ability to perform TA contraction w/o valsalva      Functional Goals:   - By DC, pt will score a 75% or better on FOTO to demonstrate improved functional level   - By DC, pt will be able to demonstrate proper and safe lifting mechanics to reduce further strain on low back and reinjury   - By DC, pt will demonstrate IND and compliance with HEP for improved carryover at home        Plan  Patient would benefit from: skilled physical therapy  Planned modality interventions: cryotherapy, TENS and unattended electrical stimulation  Planned therapy interventions: activity modification, abdominal trunk stabilization, joint mobilization, manual therapy, neuromuscular re-education, behavior modification, body mechanics training, postural training, patient education, strengthening, stretching, therapeutic activities, therapeutic exercise, flexibility, functional ROM exercises and home exercise program  Frequency: 2x week  Duration in weeks: 6  Plan of Care beginning date: 6/9/2021  Plan of Care expiration date: 7/9/2021  Treatment plan discussed with: patient        Subjective Evaluation    History of Present Illness  Mechanism of injury: Pt reports low back pain started several years ago, pt does report his shooting pain has started over the past few months  Shooting down from center to L hip overall  Pt feels that it gets worse when lifting and bending overall  Pt states that he sometimes feels some tingling in his L heel from time to time but overall no shooting numbness or tingling down his legs  Pt states no problems with sleeping   Pt states that he has been taking alieve for the past several days which has been helping  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 which all bothers his back more   Pt states most of his pain and stiffness occurs when he first wakes up and after getting up after sitting for a while           Recurrent probem    Quality of life: good    Pain  Current pain ratin  At best pain ratin  At worst pain ratin  Location: low back around waistline  Quality: dull ache and tight  Relieving factors: relaxation, medications and change in position  Aggravating factors: standing, sitting, stair climbing, nothing and lifting  Progression: no change    Social Support  Steps to enter house: yes  Stairs in house: yes   Lives in: multiple-level home    Employment status: working    Diagnostic Tests    FCE comments: MRI will be scheduled Treatments  Previous treatment: medication  Current treatment: medication  Patient Goals  Patient goals for therapy: increased strength, independence with ADLs/IADLs, return to sport/leisure activities, increased motion and decreased pain  Patient goal: pt would like to reduce pain and reduce his shooting pain         Objective     Concurrent Complaints  Negative for night pain, disturbed sleep, bladder dysfunction, bowel dysfunction and saddle (S4) numbness    Postural Observations  Seated posture: fair  Standing posture: good  Correction of posture: has no consistent effect        Palpation     Additional Palpation Details  TTP: L PSIS and surrounding musculature as well as over lower lumbar spinous processes    Neurological Testing     Sensation     Lumbar   Left   Intact: light touch    Right   Intact: light touch    Active Range of Motion     Additional Active Range of Motion Details  Flex: WFL, pt had slight pull and pain in low back   Ext: WFL   ROT: 50% R/L  SB: WFL     Slight tightness with hip ER and hamstrings R/L     Strength/Myotome Testing     Left Hip   Planes of Motion   Flexion: 4  Extension: 3-  Abduction: 4-    Right Hip   Planes of Motion   Flexion: 4+  Extension: 3-  Abduction: 4    Left Knee   Flexion: 5  Extension: 5    Right Knee   Flexion: 5  Extension: 5    Left Ankle/Foot   Dorsiflexion: 5  Plantar flexion: 4    Right Ankle/Foot   Dorsiflexion: 5  Plantar flexion: 4    Additional Strength Details  Increased hamstring activation with prone hip ext, poor core stability w/ increased valsalva    Tests   Pain with max opening      Lumbar   Positive Valsalva  Left   Negative crossed SLR, femoral stretch, passive SLR and slump test      Right   Negative crossed SLR, femoral stretch, passive SLR and slump test      Left Hip   Negative KAREN  Right Hip   Negative KAREN                Diagnosis: low back pain due to chronic strain    Precautions: none   Primary goals: reduce pain    Asterisks next to exercises = provided for patient HEP   Manual Therapy 6/9       PROM hips        MFR                                Exercise Diary         TA activation Iso: 10x 5''         TA knee fall outs 12x ea leg       TA bridges 12x       TA march alternating 12x ea leg       clamshells        Hamstring stretching                                                                                Ther Act                                        Modalities

## 2021-06-15 ENCOUNTER — OFFICE VISIT (OUTPATIENT)
Dept: PHYSICAL THERAPY | Facility: REHABILITATION | Age: 51
End: 2021-06-15
Payer: COMMERCIAL

## 2021-06-15 DIAGNOSIS — R10.32 LEFT GROIN PAIN: ICD-10-CM

## 2021-06-15 DIAGNOSIS — M54.16 LUMBAR RADICULOPATHY: ICD-10-CM

## 2021-06-15 DIAGNOSIS — M54.42 CHRONIC LEFT-SIDED LOW BACK PAIN WITH LEFT-SIDED SCIATICA: Primary | ICD-10-CM

## 2021-06-15 DIAGNOSIS — G89.29 CHRONIC LEFT-SIDED LOW BACK PAIN WITH LEFT-SIDED SCIATICA: Primary | ICD-10-CM

## 2021-06-15 PROCEDURE — 97112 NEUROMUSCULAR REEDUCATION: CPT | Performed by: PHYSICAL THERAPIST

## 2021-06-15 PROCEDURE — 97140 MANUAL THERAPY 1/> REGIONS: CPT | Performed by: PHYSICAL THERAPIST

## 2021-06-15 PROCEDURE — 97110 THERAPEUTIC EXERCISES: CPT | Performed by: PHYSICAL THERAPIST

## 2021-06-15 NOTE — PROGRESS NOTES
Daily Note     Today's date: 6/15/2021  Patient name: Collin Maza  : 1970  MRN: [id number]  Referring provider: Lenny Friedman  Dx:   Encounter Diagnosis     ICD-10-CM    1  Chronic left-sided low back pain with left-sided sciatica  M54 42     G89 29    2  Lumbar radiculopathy  M54 16    3  Left groin pain  R10 32        Start Time: 6282  Stop Time: 1505  Total time in clinic (min): 70 minutes    Subjective: Pt states that he is currently pain-free, but he took Aleve today to help with the pain  Pain is usually worse in the morning right after waking up or after prolonged sitting  Objective: See treatment diary below      Assessment: Tolerated treatment well  Patient would benefit from continued PT  No complaints of pain throughout treatment session, but pt expressed low back tightness during therapeutic interventions  1:1 with PADMA Mckenzie under direct supervision of Sherrie Lugo DPT for entirety of tx  Plan: Progress treatment as tolerated         Diagnosis: low back pain due to chronic strain    Precautions: none   Primary goals: reduce pain    Asterisks next to exercises = provided for patient HEP   Manual Therapy 6/9 6/15      PROM hips        MFR        L/S P-A mobs  KS Gr I/II      Ther Ex        NuStep  10' L4      Reverse hypers  3x10      Goblet box squats  15# 2x10      Suitcase carry  20# 3x1' ea      Bent over rows  5# x10      Armin curl  15# 3x10              Ther Activity                        Neuro Re-ed        TA activation Iso: 10x 5''       TA knee fall outs 12x ea leg       TA bridges 12x 35# 2x15      TA march alternating 12x ea leg       PPU  x15      [place] ROT  9# 2x15 seated      Pallof press  10# 2x15

## 2021-06-24 ENCOUNTER — OFFICE VISIT (OUTPATIENT)
Dept: PHYSICAL THERAPY | Facility: REHABILITATION | Age: 51
End: 2021-06-24
Payer: COMMERCIAL

## 2021-06-24 DIAGNOSIS — G89.29 CHRONIC LEFT-SIDED LOW BACK PAIN WITH LEFT-SIDED SCIATICA: ICD-10-CM

## 2021-06-24 DIAGNOSIS — M54.16 LUMBAR RADICULOPATHY: Primary | ICD-10-CM

## 2021-06-24 DIAGNOSIS — M54.42 CHRONIC LEFT-SIDED LOW BACK PAIN WITH LEFT-SIDED SCIATICA: ICD-10-CM

## 2021-06-24 DIAGNOSIS — R10.32 LEFT GROIN PAIN: ICD-10-CM

## 2021-06-24 PROCEDURE — 97110 THERAPEUTIC EXERCISES: CPT | Performed by: PHYSICAL THERAPIST

## 2021-06-24 PROCEDURE — 97112 NEUROMUSCULAR REEDUCATION: CPT | Performed by: PHYSICAL THERAPIST

## 2021-06-24 NOTE — PROGRESS NOTES
Daily Note     Today's date: 2021  Patient name: Ji Pinedo  : 1970  MRN: [id number]  Referring provider: Terry Raya PA-C  Dx:   Encounter Diagnosis     ICD-10-CM    1  Lumbar radiculopathy  M54 16    2  Chronic left-sided low back pain with left-sided sciatica  M54 42     G89 29    3  Left groin pain  R10 32        Start Time: 1400  Stop Time: 1500  Total time in clinic (min): 60 minutes    Subjective: Pt reports continued left-sided low back pain, but it has been steadily improving  Pt helping a friend move over the weekend, but he states that lifting objects usually does not aggravate pain  Prolonged bending and twisting is most aggravating  Objective: See treatment diary below      Assessment: Tolerated treatment well  Patient would benefit from continued PT  Pt without an increase in back pain throughout session  Pt performed cross body ratna curls without aggravating symptoms  1:1 with PADMA Polo under direct supervision of Chela Pruitt DPT for 30 minutes of tx  Plan: Progress treatment as tolerated         Diagnosis: low back pain due to chronic strain    Precautions: none   Primary goals: reduce pain    Asterisks next to exercises = provided for patient HEP   Manual Therapy 6/9 6/15 6/24     PROM hips        MFR        L/S P-A mobs  KS Gr I/II      Ther Ex        NuStep  10' L4 10' L5     Reverse hypers  3x10 3x10     Goblet box squats  15# 2x10 20# 3x10     Bent over rows  5# x10      Ratna curl  15# 3x10 Staggered x-body 15# 3x10 ea             Ther Activity        Suitcase carry  20# 3x1' ea 25# x1' ea  35# 2x1' ea             Neuro Re-ed        TA activation Iso: 10x 5''       TA knee fall outs 12x ea leg       TA bridges 12x 35# 2x15 35# 2x15     TA march alternating 12x ea leg       PPU  x15 2x15     [place] ROT  9# 2x15 seated 15# 2x15 seated     Pallof press  10# 2x15 11# 2x15

## 2021-06-29 ENCOUNTER — OFFICE VISIT (OUTPATIENT)
Dept: PHYSICAL THERAPY | Facility: REHABILITATION | Age: 51
End: 2021-06-29
Payer: COMMERCIAL

## 2021-06-29 DIAGNOSIS — M54.16 LUMBAR RADICULOPATHY: Primary | ICD-10-CM

## 2021-06-29 DIAGNOSIS — G89.29 CHRONIC LEFT-SIDED LOW BACK PAIN WITH LEFT-SIDED SCIATICA: ICD-10-CM

## 2021-06-29 DIAGNOSIS — R10.32 LEFT GROIN PAIN: ICD-10-CM

## 2021-06-29 DIAGNOSIS — M54.42 CHRONIC LEFT-SIDED LOW BACK PAIN WITH LEFT-SIDED SCIATICA: ICD-10-CM

## 2021-06-29 PROCEDURE — 97112 NEUROMUSCULAR REEDUCATION: CPT | Performed by: PHYSICAL THERAPIST

## 2021-06-29 PROCEDURE — 97110 THERAPEUTIC EXERCISES: CPT | Performed by: PHYSICAL THERAPIST

## 2021-06-29 NOTE — PROGRESS NOTES
Daily Note     Today's date: 2021  Patient name: Lew Bassett  : 1970  MRN: [id number]  Referring provider: Alcon Odonnell PA-C  Dx:   Encounter Diagnosis     ICD-10-CM    1  Lumbar radiculopathy  M54 16    2  Chronic left-sided low back pain with left-sided sciatica  M54 42     G89 29    3  Left groin pain  R10 32        Start Time: 1400  Stop Time: 78  Total time in clinic (min): 45 minutes    Subjective: Pt reports that he is currently pain-free but his lower back feels tight  Did not aggravate symptoms over the weekend when he helped a friend move and cleaned out his garage  Objective: See treatment diary below      Assessment: Tolerated treatment well  Patient exhibited good technique with therapeutic exercises  Pt demonstrates ability to perform functional movements with only a mild soreness or stretching  Pt reports only a tightness with the therapeutic interventions  Continue to progress interventions with increased weights and more functional/work related movements  1:1 with PADMA Lozano under direct supervision of Jen Hanson DPT for entirety of tx  Plan: Progress treatment as tolerated         Diagnosis: low back pain due to chronic strain    Precautions: none   Primary goals: reduce pain    Asterisks next to exercises = provided for patient HEP   Manual Therapy 6/9 6/15 6/24 6/29    PROM hips        MFR        L/S P-A mobs  KS Gr I/II      Ther Ex        NuStep  10' L4 10' L5 10' L5    Reverse hypers  3x10 3x10 3x10    Goblet box squats  15# 2x10 20# 3x10 20# 3x10    Bent over rows  5# x10      Armin curl  15# 3x10 Staggered x-body 15# 3x10 ea             Ther Activity        Suitcase carry  20# 3x1' ea 25# x1' ea  35# 2x1' ea 35# 3x1' ea            Neuro Re-ed        TA activation Iso: 10x 5''       TA knee fall outs 12x ea leg       TA bridges 12x 35# 2x15 35# 2x15 35# 2x15    TA march alternating 12x ea leg       PPU  x15 2x15     [place] ROT  9# 2x15 seated 15# 2x15 seated 15# 2x15 standing    Pallof press  10# 2x15 11# 2x15 15# 2x15

## 2021-07-23 ENCOUNTER — HOSPITAL ENCOUNTER (OUTPATIENT)
Dept: MRI IMAGING | Facility: HOSPITAL | Age: 51
Discharge: HOME | End: 2021-07-23
Payer: COMMERCIAL

## 2021-07-23 DIAGNOSIS — M54.16 LUMBAR RADICULOPATHY: ICD-10-CM

## 2021-07-23 DIAGNOSIS — M54.50 LOWER BACK PAIN: ICD-10-CM

## 2021-07-23 DIAGNOSIS — R10.32 LEFT GROIN PAIN: ICD-10-CM

## 2021-07-23 PROCEDURE — 72148 MRI LUMBAR SPINE W/O DYE: CPT

## 2021-07-23 PROCEDURE — G1004 CDSM NDSC: HCPCS

## 2021-07-28 ENCOUNTER — OFFICE VISIT (OUTPATIENT)
Dept: NEUROSURGERY | Facility: CLINIC | Age: 51
End: 2021-07-28
Payer: COMMERCIAL

## 2021-07-28 VITALS
HEART RATE: 47 BPM | DIASTOLIC BLOOD PRESSURE: 70 MMHG | BODY MASS INDEX: 27.92 KG/M2 | RESPIRATION RATE: 18 BRPM | WEIGHT: 195 LBS | SYSTOLIC BLOOD PRESSURE: 126 MMHG | HEIGHT: 70 IN | TEMPERATURE: 98.7 F

## 2021-07-28 DIAGNOSIS — M54.16 LUMBAR RADICULOPATHY: Primary | ICD-10-CM

## 2021-07-28 DIAGNOSIS — M99.79 FORAMINAL STENOSIS DUE TO INTERVERTEBRAL DISC DISEASE: ICD-10-CM

## 2021-07-28 DIAGNOSIS — M48.061 SPINAL STENOSIS OF LUMBAR REGION WITHOUT NEUROGENIC CLAUDICATION: ICD-10-CM

## 2021-07-28 DIAGNOSIS — M51.9 FORAMINAL STENOSIS DUE TO INTERVERTEBRAL DISC DISEASE: ICD-10-CM

## 2021-07-28 DIAGNOSIS — R10.32 LEFT GROIN PAIN: ICD-10-CM

## 2021-07-28 PROCEDURE — 4004F PT TOBACCO SCREEN RCVD TLK: CPT | Performed by: PHYSICIAN ASSISTANT

## 2021-07-28 PROCEDURE — 99213 OFFICE O/P EST LOW 20 MIN: CPT | Performed by: PHYSICIAN ASSISTANT

## 2021-07-28 PROCEDURE — 3008F BODY MASS INDEX DOCD: CPT | Performed by: PHYSICIAN ASSISTANT

## 2021-07-28 NOTE — PROGRESS NOTES
Patient ID: Eileen Merlos is a 46 y o  male  Diagnoses and all orders for this visit:    Lumbar radiculopathy  -     X-ray lumbar spine flexion and extension only 4+ views; Future  -     Ambulatory referral to Pain Management; Future    Left groin pain  -     X-ray lumbar spine flexion and extension only 4+ views; Future  -     Ambulatory referral to Pain Management; Future    Spinal stenosis of lumbar region without neurogenic claudication  -     X-ray lumbar spine flexion and extension only 4+ views; Future  -     Ambulatory referral to Pain Management; Future    Foraminal stenosis due to intervertebral disc disease  -     X-ray lumbar spine flexion and extension only 4+ views; Future  -     Ambulatory referral to Pain Management; Future          Assessment/Plan:    Very pleasant 68-year-old male, returns to review results of his MRI of the lumbar spine  Continues with the same complaints as noted at visit of 6/2/21, specifically chronic left low back pain, left groin pain, and intermittent left leg pain  He has been participating in physical therapy with minimal at most improvement and actually at times she reports aggravates his condition  When questioned about ambulatory distance he denies any decrease in ambulatory distance or leg pain, or leg heaviness associated with ambulation  He otherwise denies gait or balance disturbance, motor or sensory difficulties in the lower extremities, bowel or bladder incontinence, perineal anesthesia  His past medical history is negative for heart disease, lung disease, kidney or liver disorder, seizure disorder, cancer, ulcer, rheumatic fever, or bleeding disorder        He has had MRI of his lumbar spine, 7/23/21, the study was very carefully reviewed in detail above Dr Francis Rios, and and L3-L4 disc herniation with far lateral left impression is noted, correlating with his symptomatology, there is also an L4-L3 disc herniation with right foraminal narrowing, he also has moderate spinal stenosis L3-L4 greater than L2-L3 with cord bunching/redundants  Study was reviewed in detail with the patient  His examination remains unchanged from visit of 6/2/21, he is awake, alert, oriented x3  In no acute distress at this time  Dr Emory Wayne opines that a trial of epidural steroid injections should be initiated particularly L3 on the left  As he has not benefitted from physical therapy this can be discontinued, although he is advised to continue with regular home regimen  Flexion-extension films of the lumbar spine are also requested to evaluate for any dynamic instability  The patient understands that after reasonable trial of above, 3-4 months, should this proved to be ineffective he is to return to Neurosurgery, Dr Emory Wayne for further management recommendations  These findings, impressions or recommendations reviewed in great detail with the patient his questions were answered completely and to his satisfaction, he is agreeable with the plan  Return if symptoms worsen or fail to improve  Chief Complaint  Low back pain left hip left thigh pain, left groin pain  HPI       The following portions of the patient's history were reviewed and updated as appropriate: allergies, current medications, past family history, past medical history, past social history, past surgical history and problem list     Review of Systems   Constitutional: Negative  HENT: Negative  Eyes: Negative  Respiratory: Negative  Cardiovascular: Negative  H/o Hyperlipidemia   Gastrointestinal: Positive for constipation (due to IBS)  H/o IBS     Endocrine: Negative  Genitourinary: Negative  Musculoskeletal: Positive for back pain (B/l LBP radiates to left hip and groin area x 1yr  Prolonged sitting/bending increases his pain)  PT --- NONE   Pain Man -- NONE   Takes Ibuprofen  600 mg 1 daily-- mild relief   Skin: Negative  Allergic/Immunologic: Negative  Neurological: Negative  Hematological: Negative  Psychiatric/Behavioral: Negative for sleep disturbance  All other systems reviewed and are negative  Objective:    Physical Exam  Constitutional:       Appearance: He is well-developed  HENT:      Head: Normocephalic and atraumatic  Eyes:      Pupils: Pupils are equal, round, and reactive to light  Cardiovascular:      Rate and Rhythm: Normal rate  Pulmonary:      Effort: Pulmonary effort is normal       Breath sounds: Normal breath sounds  Skin:     General: Skin is warm and dry  Neurological:      Mental Status: He is alert and oriented to person, place, and time  Neurologic Exam     Mental Status   Oriented to person, place, and time  Cranial Nerves     CN III, IV, VI   Pupils are equal, round, and reactive to light  MRI LUMBAR SPINE WITHOUT CONTRAST   7/23/21     INDICATION: M54 5: Low back pain  R10 32: Left lower quadrant pain  M54 16: Radiculopathy, lumbar region      COMPARISON:  MR 9/6/2012     TECHNIQUE:  Sagittal T1, sagittal T2, sagittal inversion recovery, axial T1 and axial T2, coronal T2     IMAGE QUALITY:  Diagnostic     FINDINGS:     VERTEBRAL BODIES:  There are 5 nonrib-bearing lumbar type vertebral bodies  Progression of degenerative changes most notably, at the L3-L4 level which results in asymmetric loss of disc height to the left and asymmetric left lateral marginal osteophytes   with increased leftward translation of L2 to on L3  Modic type I marrow changes to the left at L3-4      SACRUM:  Normal signal within the sacrum  No evidence of insufficiency or stress fracture      DISTAL CORD AND CONUS:  Normal size and signal within the distal cord and conus  Conus terminates at the caudal L1 level  Redundancy of the cauda equina at the L2-3 and L3-4 levels owing to canal stenosis      PARASPINAL SOFT TISSUES:  Paraspinal soft tissues are unremarkable    3 4 cm cystic focus in the right lobe of liver, described on CT 7/3/2019     LOWER THORACIC DISC SPACES:  Endplate degenerative changes T11-12      LUMBAR DISC SPACES:     L1-L2:  Mild bilateral facet arthrosis     L2-L3:  Relative leftward translation of L2 on L3 has increased, circumferential bulging the discs with the loss of disc height  The AP dimension of the sac reduced to estimated 6-7 mm  Much of the CSF within the sac is effaced  Moderate facet   arthrosis  Right lateral disc in contact with but not compressing post foraminal right L2 root      L3-L4:  Further reduction disc height in the degenerative endplate changes asymmetric to the left  Circumferential bulge extending laterally, with foraminal protrusion in contact with the left L3 root  Correlate for left L3 radiculitis  AP dimension   of the sac reduced to 5-6 mm  Again there is redundancy of the cauda equina above this level      L4-L5:  Right greater than left facet arthrosis, minor disc bulge, no compression      L5-S1:  Mild circumferential bulge, mild bilateral facet arthrosis  Focal left posterolateral annular fissure and minor displacement of disc material, 6:23, 3:7   No definite S1 root compression      IMPRESSION:     Progression of degenerative changes as described most notably to the left at the L3-L4 level  Correlate for left L3 radiculitis, and signs and symptoms of spinal stenosis

## 2021-07-28 NOTE — LETTER
July 28, 2021     [place], [address]  [address] [address]    Patient: Rosa Murdock   YOB: 1970   Date of Visit: 7/28/2021       Dear Dr Ivan Qureshi:    Thank you for allowing me to participate in the care of Reyes Shah  Below are my notes for this consultation  If you have questions, please do not hesitate to call me  I look forward to following your patient along with you  Sincerely,        Rosalio Canela PA-C        CC: Kip Michael, DO Rosalio Canela PA-C  7/28/2021  2:38 PM  Sign when Signing Visit  Patient ID: Rosa Murdock is a 46 y o  male  Diagnoses and all orders for this visit:    Lumbar radiculopathy  -     X-ray lumbar spine flexion and extension only 4+ views; Future  -     Ambulatory referral to Pain Management; Future    Left groin pain  -     X-ray lumbar spine flexion and extension only 4+ views; Future  -     Ambulatory referral to Pain Management; Future    Spinal stenosis of lumbar region without neurogenic claudication  -     X-ray lumbar spine flexion and extension only 4+ views; Future  -     Ambulatory referral to Pain Management; Future    Foraminal stenosis due to intervertebral disc disease  -     X-ray lumbar spine flexion and extension only 4+ views; Future  -     Ambulatory referral to Pain Management; Future          Assessment/Plan:    Very pleasant 43-year-old male, returns to review results of his MRI of the lumbar spine  Continues with the same complaints as noted at visit of 6/2/21, specifically chronic left low back pain, left groin pain, and intermittent left leg pain  He has been participating in physical therapy with minimal at most improvement and actually at times she reports aggravates his condition  When questioned about ambulatory distance he denies any decrease in ambulatory distance or leg pain, or leg heaviness associated with ambulation      He otherwise denies gait or balance disturbance, motor or sensory difficulties in the lower extremities, bowel or bladder incontinence, perineal anesthesia  His past medical history is negative for heart disease, lung disease, kidney or liver disorder, seizure disorder, cancer, ulcer, rheumatic fever, or bleeding disorder  He has had MRI of his lumbar spine, 7/23/21, the study was very carefully reviewed in detail above Dr Sheila Cobos, and and L3-L4 disc herniation with far lateral left impression is noted, correlating with his symptomatology, there is also an L4-L3 disc herniation with right foraminal narrowing, he also has moderate spinal stenosis L3-L4 greater than L2-L3 with cord bunching/redundants  Study was reviewed in detail with the patient  His examination remains unchanged from visit of 6/2/21, he is awake, alert, oriented x3  In no acute distress at this time  Dr Sheila Cobos opines that a trial of epidural steroid injections should be initiated particularly L3 on the left  As he has not benefitted from physical therapy this can be discontinued, although he is advised to continue with regular home regimen  Flexion-extension films of the lumbar spine are also requested to evaluate for any dynamic instability  The patient understands that after reasonable trial of above, 3-4 months, should this proved to be ineffective he is to return to Neurosurgery, Dr Sheila Cobos for further management recommendations  These findings, impressions or recommendations reviewed in great detail with the patient his questions were answered completely and to his satisfaction, he is agreeable with the plan  Return if symptoms worsen or fail to improve  Chief Complaint  Low back pain left hip left thigh pain, left groin pain      HPI       The following portions of the patient's history were reviewed and updated as appropriate: allergies, current medications, past family history, past medical history, past social history, past surgical history and problem list     Review of Systems   Constitutional: Negative  HENT: Negative  Eyes: Negative  Respiratory: Negative  Cardiovascular: Negative  H/o Hyperlipidemia   Gastrointestinal: Positive for constipation (due to IBS)  H/o IBS     Endocrine: Negative  Genitourinary: Negative  Musculoskeletal: Positive for back pain (B/l LBP radiates to left hip and groin area x 1yr  Prolonged sitting/bending increases his pain)  PT --- NONE   Pain Man -- NONE   Takes Ibuprofen  600 mg 1 daily-- mild relief   Skin: Negative  Allergic/Immunologic: Negative  Neurological: Negative  Hematological: Negative  Psychiatric/Behavioral: Negative for sleep disturbance  All other systems reviewed and are negative  Objective:    Physical Exam  Constitutional:       Appearance: He is well-developed  HENT:      Head: Normocephalic and atraumatic  Eyes:      Pupils: Pupils are equal, round, and reactive to light  Cardiovascular:      Rate and Rhythm: Normal rate  Pulmonary:      Effort: Pulmonary effort is normal       Breath sounds: Normal breath sounds  Skin:     General: Skin is warm and dry  Neurological:      Mental Status: He is alert and oriented to person, place, and time  Neurologic Exam     Mental Status   Oriented to person, place, and time  Cranial Nerves     CN III, IV, VI   Pupils are equal, round, and reactive to light  MRI LUMBAR SPINE WITHOUT CONTRAST   7/23/21     INDICATION: M54 5: Low back pain  R10 32: Left lower quadrant pain  M54 16: Radiculopathy, lumbar region      COMPARISON:  MR 9/6/2012     TECHNIQUE:  Sagittal T1, sagittal T2, sagittal inversion recovery, axial T1 and axial T2, coronal T2     IMAGE QUALITY:  Diagnostic     FINDINGS:     VERTEBRAL BODIES:  There are 5 nonrib-bearing lumbar type vertebral bodies    Progression of degenerative changes most notably, at the L3-L4 level which results in asymmetric loss of disc height to the left and asymmetric left lateral marginal osteophytes   with increased leftward translation of L2 to on L3  Modic type I marrow changes to the left at L3-4      SACRUM:  Normal signal within the sacrum  No evidence of insufficiency or stress fracture      DISTAL CORD AND CONUS:  Normal size and signal within the distal cord and conus  Conus terminates at the caudal L1 level  Redundancy of the cauda equina at the L2-3 and L3-4 levels owing to canal stenosis      PARASPINAL SOFT TISSUES:  Paraspinal soft tissues are unremarkable  3 4 cm cystic focus in the right lobe of liver, described on CT 7/3/2019     LOWER THORACIC DISC SPACES:  Endplate degenerative changes T11-12      LUMBAR DISC SPACES:     L1-L2:  Mild bilateral facet arthrosis     L2-L3:  Relative leftward translation of L2 on L3 has increased, circumferential bulging the discs with the loss of disc height  The AP dimension of the sac reduced to estimated 6-7 mm  Much of the CSF within the sac is effaced  Moderate facet   arthrosis  Right lateral disc in contact with but not compressing post foraminal right L2 root      L3-L4:  Further reduction disc height in the degenerative endplate changes asymmetric to the left  Circumferential bulge extending laterally, with foraminal protrusion in contact with the left L3 root  Correlate for left L3 radiculitis  AP dimension   of the sac reduced to 5-6 mm  Again there is redundancy of the cauda equina above this level      L4-L5:  Right greater than left facet arthrosis, minor disc bulge, no compression      L5-S1:  Mild circumferential bulge, mild bilateral facet arthrosis  Focal left posterolateral annular fissure and minor displacement of disc material, 6:23, 3:7   No definite S1 root compression      IMPRESSION:     Progression of degenerative changes as described most notably to the left at the L3-L4 level    Correlate for left L3 radiculitis, and signs and symptoms of spinal stenosis

## 2021-09-02 ENCOUNTER — CONSULT (OUTPATIENT)
Dept: PAIN MEDICINE | Facility: CLINIC | Age: 51
End: 2021-09-02
Payer: COMMERCIAL

## 2021-09-02 VITALS — WEIGHT: 202 LBS | BODY MASS INDEX: 28.92 KG/M2 | HEIGHT: 70 IN

## 2021-09-02 DIAGNOSIS — M99.79 FORAMINAL STENOSIS DUE TO INTERVERTEBRAL DISC DISEASE: ICD-10-CM

## 2021-09-02 DIAGNOSIS — M51.9 FORAMINAL STENOSIS DUE TO INTERVERTEBRAL DISC DISEASE: ICD-10-CM

## 2021-09-02 DIAGNOSIS — M54.16 LUMBAR RADICULOPATHY: Primary | ICD-10-CM

## 2021-09-02 DIAGNOSIS — M48.061 SPINAL STENOSIS OF LUMBAR REGION WITHOUT NEUROGENIC CLAUDICATION: ICD-10-CM

## 2021-09-02 DIAGNOSIS — M47.816 LUMBAR SPONDYLOSIS: ICD-10-CM

## 2021-09-02 PROCEDURE — 99204 OFFICE O/P NEW MOD 45 MIN: CPT | Performed by: ANESTHESIOLOGY

## 2021-09-02 PROCEDURE — 3008F BODY MASS INDEX DOCD: CPT | Performed by: ANESTHESIOLOGY

## 2021-09-02 PROCEDURE — 4004F PT TOBACCO SCREEN RCVD TLK: CPT | Performed by: ANESTHESIOLOGY

## 2021-09-02 NOTE — PATIENT INSTRUCTIONS
Epidural Steroid Injection   WHAT YOU NEED TO KNOW:   What do I need to know about an epidural steroid injection (FORTUNATO)? An FORTUNATO is a procedure to inject steroid medicine into the epidural space  The epidural space is between your spinal cord and vertebrae  Steroids reduce inflammation and fluid buildup in your spine that may be causing pain  You may be given pain medicine along with the steroids  How do I prepare for an FORTUNATO? Your healthcare provider will talk to you about how to prepare for your procedure  He or she will tell you what medicines to take or not take on the day of your procedure  You may need to stop taking blood thinners or other medicines several days before your procedure  You may need to adjust any diabetes medicine you take on the day of your procedure  Steroid medicine can increase your blood sugar level  Arrange for someone to drive you home when you are discharged  What will happen during an FORTUNATO? · You will be given medicine to numb the procedure area  You will be awake for the procedure, but you will not feel pain  You may also be given medicine to help you relax  Contrast liquid will be used to help your healthcare provider see the area better  Tell the healthcare provider if you have ever had an allergic reaction to contrast liquid  · Your healthcare provider may place the needle into your neck area, middle of your back, or tailbone area  He may inject the medicine next to the nerves that are causing your pain  He may instead inject the medicine into a larger area of the epidural space  This helps the medicine spread to more nerves  Your healthcare provider will use a fluoroscope to help guide the needle to the right place  A fluoroscope is a type of x-ray  After the procedure, a bandage will be placed over the injection site to prevent infection  What will happen after an FORTUNATO? You will have a bandage over the injection site to prevent infection   Your healthcare provider will tell you when you can bathe and any activity guidelines  You will be able to go home  What are the risks of an FORTUNATO? You may have temporary or permanent nerve damage or paralysis  You may have bleeding or develop a serious infection, such as meningitis (swelling of the brain coverings)  An abscess may also develop  An abscess is a pus-filled area under the skin  You may need surgery to fix the abscess  You may have a seizure, anxiety, or trouble sleeping  If you are a man, you may have temporary erectile dysfunction (not able to have an erection)  CARE AGREEMENT:   You have the right to help plan your care  Learn about your health condition and how it may be treated  Discuss treatment options with your healthcare providers to decide what care you want to receive  You always have the right to refuse treatment  The above information is an  only  It is not intended as medical advice for individual conditions or treatments  Talk to your doctor, nurse or pharmacist before following any medical regimen to see if it is safe and effective for you  © Copyright [employer] [facility] 2021 Information is for End User's use only and may not be sold, redistributed or otherwise used for commercial purposes   All illustrations and images included in CareNotes® are the copyrighted property of [employer]  or [address] [place] [address] Exposed Vocalspape

## 2021-09-02 NOTE — PROGRESS NOTES
Assessment  1  Lumbar radiculopathy    2  Spinal stenosis of lumbar region without neurogenic claudication    3  Foraminal stenosis due to intervertebral disc disease    4  Lumbar spondylosis        Plan  57-year-old male referred by Jerald Griffin PA-C of Neurosurgery, presenting for initial consultation regarding  A 1 5 year history of lumbosacral back pain that radiates into the left hip and occasionally into the left groin and thigh with associated paresthesias  He denies any trauma or inciting event  MRI of the lumbar spine shows multilevel degenerative disc disease and facet arthrosis  Right-sided disc herniation at L2-3 which contacts the right L2 root  Left-sided disc herniation at L3-4 contacting the left L3 root  The patient has done physical therapy without any significant sustainable relief  Initially physical therapy was helpful, however this is no longer the case  Tylenol and OTC NSAIDs provide minimal relief  Patient's symptoms are consistent with left L3 radiculopathy  Low back pain is likely multifactorial including myofascial and facet mediated components  No evidence of sacroiliitis  1  I will schedule the patient for left L3 TFESI to reduce the inflammatory component his pain  2  Patient may continue with Aleve p r n   3  Patient will continue with his home exercise program  4  I will follow up the patient in 6 weeks      Complete risks and benefits including bleeding, infection, tissue reaction, nerve injury and allergic reaction were discussed  The approach was demonstrated using models and literature was provided  Verbal and written consent was obtained  My impressions and treatment recommendations were discussed in detail with the patient who verbalized understanding and had no further questions  Discharge instructions were provided  I personally saw and examined the patient and I agree with the above discussed plan of care      No orders of the defined types were placed in this encounter  No orders of the defined types were placed in this encounter  History of Present Illness    Tahmina Ruiz is a 46 y o  male referred by Katherine Boland PA-C of Neurosurgery, presenting for initial consultation regarding  A 1 5 year history of lumbosacral back pain that radiates into the left hip and occasionally into the left groin and thigh with associated paresthesias  He denies any weakness of the left lower extremity  He denies any right lower extremity symptoms, bladder bowel incontinence, or saddle anesthesia  He denies any trauma or inciting event  MRI of the lumbar spine shows multilevel degenerative disc disease and facet arthrosis  Right-sided disc herniation at L2-3 which contacts the right L2 root  Left-sided disc herniation at L3-4 contacting the left L3 root  He does have redundancy of the cauda equina at L2-3 and L3-4  The patient has done physical therapy without any significant sustainable relief  Initially physical therapy was helpful, however this is no longer the case  Tylenol and OTC NSAIDs provide minimal relief  The patient rates his pain an 8/10 and the pain is nearly constant  The pain is not follow any particular pattern throughout the day  The pain is described as sharp, pressure-like, dull, and aching  The pain is increased with standing, bending, and exercise  He does find some relief with relaxation  Other than as stated above, the patient denies any interval changes in medications, medical condition, mental condition, symptoms, or allergies since the last office visit  I have personally reviewed and/or updated the patient's past medical history, past surgical history, family history, social history, current medications, allergies, and vital signs today  Review of Systems   Constitutional: Negative for fever and unexpected weight change  HENT: Negative for trouble swallowing  Eyes: Negative for visual disturbance     Respiratory: Negative for shortness of breath and wheezing  Cardiovascular: Negative for chest pain and palpitations  Gastrointestinal: Negative for constipation, diarrhea, nausea and vomiting  Endocrine: Negative for cold intolerance, heat intolerance and polydipsia  Genitourinary: Negative for difficulty urinating and frequency  Musculoskeletal: Positive for arthralgias and myalgias  Negative for gait problem  Skin: Positive for rash  Neurological: Negative for dizziness, seizures, syncope, weakness and headaches  Hematological: Does not bruise/bleed easily  Psychiatric/Behavioral: Negative for dysphoric mood  All other systems reviewed and are negative  Patient Active Problem List   Diagnosis    Dyspepsia    Colon cancer screening    Irritable bowel syndrome with constipation    Acute pain of right shoulder    Left groin pain    Lumbar radiculopathy    Spinal stenosis of lumbar region    Foraminal stenosis due to intervertebral disc disease       Past Medical History:   Diagnosis Date    Arthritis     Constipation        Past Surgical History:   Procedure Laterality Date    COLONOSCOPY      [place] ESOPHAGOGASTRODUODENOSCOPY TRANSORAL DIAGNOSTIC N/A 10/18/2018    Procedure: EGD AND COLONOSCOPY;  Surgeon: Felipe Goetz DO;  Location: [facility];   Service: Gastroenterology    UPPER GASTROINTESTINAL ENDOSCOPY         Family History   Problem Relation Age of Onset    Pancreatic cancer Mother     Hypertension Mother        Social History     Occupational History    Not on file   Tobacco Use    Smoking status: Light Tobacco Smoker     Packs/day: 0 25    Smokeless tobacco: Never Used    Tobacco comment: cigars daily   Substance and Sexual Activity    Alcohol use: Yes     Comment: socially    Drug use: No    Sexual activity: Not on file       Current Outpatient Medications on File Prior to Visit   Medication Sig    Amitiza 24 MCG capsule TAKE 1 CAPSULE (24 MCG TOTAL) BY MOUTH 2 (TWO) TIMES A DAY WITH MEALS    calcipotriene (DOVONOX) 0 005 % ointment Apply topically 2 (two) times a day Saturday and Sunday    Cetirizine HCl (ZYRTEC ALLERGY) 10 MG CAPS Take by mouth as needed     clobetasol (TEMOVATE) 0 05 % external solution APPLY TO AFFECTED AREA EVERY DAY    fluticasone (FLONASE ALLERGY RELIEF) 50 mcg/act nasal spray as needed     lisinopril (ZESTRIL) 10 mg tablet TAKE 1 TABLET BY MOUTH EVERY DAY    rosuvastatin (CRESTOR) 10 MG tablet TAKE 1 TABLET BY MOUTH EVERY DAY    sildenafil (VIAGRA) 50 MG tablet Take 1 tablet (50 mg total) by mouth daily as needed for erectile dysfunction    triamcinolone (KENALOG) 0 1 % ointment Apply topically 2 (two) times a day Monday thru Friday     No current facility-administered medications on file prior to visit  No Known Allergies    Physical Exam    There were no vitals taken for this visit  Constitutional: normal, well developed, well nourished, alert, in no distress and non-toxic and no overt pain behavior  Eyes: anicteric  HEENT: grossly intact  Neck: supple, symmetric, trachea midline and no masses   Pulmonary:even and unlabored  Cardiovascular:No edema or pitting edema present  Skin:Normal without rashes or lesions and well hydrated  Psychiatric:Mood and affect appropriate  Neurologic:Cranial Nerves II-XII grossly intact  Musculoskeletal:normalGait  Bilateral lumbar paraspinals nontender to palpation  Bilateral SI joints and trochanteric flares nontender to palpation  Bilateral patellar and Achilles reflexes were 2/4 and symmetrical   No clonus was noted bilaterally  Bilateral lower extremity strength 5/5 in all muscle groups  Sensation intact to light touch in L3 through S1 dermatomes bilaterally  Positive straight leg raise on the left and negative on the right  Negative Yves's test bilaterally      Imaging      PACS Images     Show images for MRI lumbar spine without contrast   Study Result    Narrative & Impression   MRI LUMBAR SPINE WITHOUT CONTRAST     INDICATION: M54 5: Low back pain  R10 32: Left lower quadrant pain  M54 16: Radiculopathy, lumbar region      COMPARISON:  MR 9/6/2012     TECHNIQUE:  Sagittal T1, sagittal T2, sagittal inversion recovery, axial T1 and axial T2, coronal T2     IMAGE QUALITY:  Diagnostic     FINDINGS:     VERTEBRAL BODIES:  There are 5 nonrib-bearing lumbar type vertebral bodies  Progression of degenerative changes most notably, at the L3-L4 level which results in asymmetric loss of disc height to the left and asymmetric left lateral marginal osteophytes   with increased leftward translation of L2 to on L3  Modic type I marrow changes to the left at L3-4      SACRUM:  Normal signal within the sacrum  No evidence of insufficiency or stress fracture      DISTAL CORD AND CONUS:  Normal size and signal within the distal cord and conus  Conus terminates at the caudal L1 level  Redundancy of the cauda equina at the L2-3 and L3-4 levels owing to canal stenosis      PARASPINAL SOFT TISSUES:  Paraspinal soft tissues are unremarkable  3 4 cm cystic focus in the right lobe of liver, described on CT 7/3/2019     LOWER THORACIC DISC SPACES:  Endplate degenerative changes T11-12      LUMBAR DISC SPACES:     L1-L2:  Mild bilateral facet arthrosis     L2-L3:  Relative leftward translation of L2 on L3 has increased, circumferential bulging the discs with the loss of disc height  The AP dimension of the sac reduced to estimated 6-7 mm  Much of the CSF within the sac is effaced  Moderate facet   arthrosis  Right lateral disc in contact with but not compressing post foraminal right L2 root      L3-L4:  Further reduction disc height in the degenerative endplate changes asymmetric to the left  Circumferential bulge extending laterally, with foraminal protrusion in contact with the left L3 root  Correlate for left L3 radiculitis  AP dimension   of the sac reduced to 5-6 mm    Again there is redundancy of the cauda equina above this level      L4-L5:  Right greater than left facet arthrosis, minor disc bulge, no compression      L5-S1:  Mild circumferential bulge, mild bilateral facet arthrosis  Focal left posterolateral annular fissure and minor displacement of disc material, 6:23, 3:7   No definite S1 root compression      IMPRESSION:     Progression of degenerative changes as described most notably to the left at the L3-L4 level  Correlate for left L3 radiculitis, and signs and symptoms of spinal stenosis        Workstation performed: [id number]     Study Result    Narrative & Impression   LUMBAR SPINE     INDICATION:  L40 9: Psoriasis, unspecified      COMPARISON:  8/20/2012, MRI lumbar spine 9/6/2012     VIEWS:  XR SPINE LUMBAR MINIMUM 4 VIEWS NON INJURY  Images: 5     FINDINGS:     There are 5 non rib bearing lumbar vertebral bodies  There is hypertrophy of the left L5 transverse process noted      There is no evidence of acute fracture or destructive osseous lesion  There is mild, chronic superior endplate deformity of O14      Mild reverse S-shaped thoracolumbar scoliosis  There is mild rightward translation of L3 in relation to L2 and L4      Degenerative disc disease most notably at L2-3 and L3-4 with disc space narrowing and marginal endplate osteophytes    Mild narrowing at L5-S1      The pedicles appear intact      Small peripheral pelvic calcification(s) on the left which may represent calcified phleboliths      IMPRESSION:     No acute osseous abnormality        Degenerative changes and mild scoliosis as described         Workstation performed: [id number]

## 2021-09-28 ENCOUNTER — HOSPITAL ENCOUNTER (OUTPATIENT)
Dept: RADIOLOGY | Facility: CLINIC | Age: 51
Discharge: HOME | End: 2021-09-28
Attending: ANESTHESIOLOGY | Admitting: ANESTHESIOLOGY
Payer: COMMERCIAL

## 2021-09-28 VITALS
OXYGEN SATURATION: 98 % | RESPIRATION RATE: 20 BRPM | HEART RATE: 56 BPM | SYSTOLIC BLOOD PRESSURE: 105 MMHG | DIASTOLIC BLOOD PRESSURE: 68 MMHG | TEMPERATURE: 98.6 F

## 2021-09-28 DIAGNOSIS — M54.16 LUMBAR RADICULOPATHY: ICD-10-CM

## 2021-09-28 PROCEDURE — 64483 NJX AA&/STRD TFRM EPI L/S 1: CPT | Performed by: ANESTHESIOLOGY

## 2021-09-28 RX ORDER — PAPAVERINE HCL 150 MG
10 CAPSULE, EXTENDED RELEASE ORAL ONCE
Status: COMPLETED | OUTPATIENT
Start: 2021-09-28 | End: 2021-09-28

## 2021-09-28 RX ADMIN — LIDOCAINE HYDROCHLORIDE 1 ML: 20 INJECTION, SOLUTION EPIDURAL; INFILTRATION; INTRACAUDAL; PERINEURAL at 08:15

## 2021-09-28 RX ADMIN — DEXAMETHASONE SODIUM PHOSPHATE 10 MG: 10 INJECTION, SOLUTION INTRAMUSCULAR; INTRAVENOUS at 08:15

## 2021-09-28 RX ADMIN — IOHEXOL 1 ML: 300 INJECTION, SOLUTION INTRAVENOUS at 08:15

## 2021-10-01 ENCOUNTER — HOSPITAL ENCOUNTER (OUTPATIENT)
Dept: RADIOLOGY | Facility: HOSPITAL | Age: 51
Discharge: HOME | End: 2021-10-01
Payer: COMMERCIAL

## 2021-10-01 DIAGNOSIS — M48.061 SPINAL STENOSIS OF LUMBAR REGION WITHOUT NEUROGENIC CLAUDICATION: ICD-10-CM

## 2021-10-01 DIAGNOSIS — M99.79 FORAMINAL STENOSIS DUE TO INTERVERTEBRAL DISC DISEASE: ICD-10-CM

## 2021-10-01 DIAGNOSIS — M51.9 FORAMINAL STENOSIS DUE TO INTERVERTEBRAL DISC DISEASE: ICD-10-CM

## 2021-10-01 DIAGNOSIS — R10.32 LEFT GROIN PAIN: ICD-10-CM

## 2021-10-01 DIAGNOSIS — M54.16 LUMBAR RADICULOPATHY: ICD-10-CM

## 2021-10-01 PROCEDURE — 72120 X-RAY BEND ONLY L-S SPINE: CPT

## 2021-10-05 ENCOUNTER — OFFICE VISIT (OUTPATIENT)
Dept: NEUROSURGERY | Facility: CLINIC | Age: 51
End: 2021-10-05
Payer: COMMERCIAL

## 2021-10-05 ENCOUNTER — TELEPHONE (OUTPATIENT)
Dept: PAIN MEDICINE | Facility: CLINIC | Age: 51
End: 2021-10-05

## 2021-10-05 VITALS
SYSTOLIC BLOOD PRESSURE: 135 MMHG | RESPIRATION RATE: 18 BRPM | WEIGHT: 202 LBS | TEMPERATURE: 98 F | BODY MASS INDEX: 28.92 KG/M2 | HEIGHT: 70 IN | HEART RATE: 66 BPM | DIASTOLIC BLOOD PRESSURE: 82 MMHG

## 2021-10-05 DIAGNOSIS — M48.061 SPINAL STENOSIS OF LUMBAR REGION WITHOUT NEUROGENIC CLAUDICATION: Primary | ICD-10-CM

## 2021-10-05 PROCEDURE — 99214 OFFICE O/P EST MOD 30 MIN: CPT | Performed by: PHYSICIAN ASSISTANT

## 2021-10-05 PROCEDURE — 4004F PT TOBACCO SCREEN RCVD TLK: CPT | Performed by: PHYSICIAN ASSISTANT

## 2021-10-05 PROCEDURE — 3008F BODY MASS INDEX DOCD: CPT | Performed by: PHYSICIAN ASSISTANT

## 2021-10-11 DIAGNOSIS — I10 ESSENTIAL HYPERTENSION: ICD-10-CM

## 2021-10-11 RX ORDER — LISINOPRIL 10 MG/1
TABLET ORAL
Qty: 30 TABLET | Refills: 5 | Status: SHIPPED | OUTPATIENT
Start: 2021-10-11 | End: 2022-04-25

## 2021-11-08 ENCOUNTER — TELEPHONE (OUTPATIENT)
Dept: PAIN MEDICINE | Facility: CLINIC | Age: 51
End: 2021-11-08

## 2021-11-10 DIAGNOSIS — M54.16 LUMBAR RADICULOPATHY: Primary | ICD-10-CM

## 2021-11-30 ENCOUNTER — HOSPITAL ENCOUNTER (OUTPATIENT)
Dept: RADIOLOGY | Facility: CLINIC | Age: 51
Discharge: HOME | End: 2021-11-30
Attending: ANESTHESIOLOGY | Admitting: ANESTHESIOLOGY
Payer: COMMERCIAL

## 2021-11-30 VITALS
DIASTOLIC BLOOD PRESSURE: 78 MMHG | TEMPERATURE: 97.6 F | OXYGEN SATURATION: 98 % | SYSTOLIC BLOOD PRESSURE: 127 MMHG | RESPIRATION RATE: 20 BRPM | HEART RATE: 57 BPM

## 2021-11-30 DIAGNOSIS — M54.16 LUMBAR RADICULOPATHY: ICD-10-CM

## 2021-11-30 PROCEDURE — 64483 NJX AA&/STRD TFRM EPI L/S 1: CPT | Performed by: ANESTHESIOLOGY

## 2021-11-30 RX ORDER — PAPAVERINE HCL 150 MG
10 CAPSULE, EXTENDED RELEASE ORAL ONCE
Status: COMPLETED | OUTPATIENT
Start: 2021-11-30 | End: 2021-11-30

## 2021-11-30 RX ADMIN — IOHEXOL 1 ML: 300 INJECTION, SOLUTION INTRAVENOUS at 09:06

## 2021-11-30 RX ADMIN — DEXAMETHASONE SODIUM PHOSPHATE 10 MG: 10 INJECTION, SOLUTION INTRAMUSCULAR; INTRAVENOUS at 09:06

## 2021-11-30 RX ADMIN — LIDOCAINE HYDROCHLORIDE 2 ML: 20 INJECTION, SOLUTION EPIDURAL; INFILTRATION; INTRACAUDAL; PERINEURAL at 09:06

## 2021-12-07 ENCOUNTER — TELEPHONE (OUTPATIENT)
Dept: PAIN MEDICINE | Facility: CLINIC | Age: 51
End: 2021-12-07

## 2021-12-17 DIAGNOSIS — L40.9 SCALP PSORIASIS: ICD-10-CM

## 2021-12-17 RX ORDER — CLOBETASOL PROPIONATE 0.46 MG/ML
SOLUTION TOPICAL
Qty: 50 ML | Refills: 5 | Status: SHIPPED | OUTPATIENT
Start: 2021-12-17

## 2022-01-15 DIAGNOSIS — E78.2 MIXED HYPERLIPIDEMIA: ICD-10-CM

## 2022-01-15 RX ORDER — ROSUVASTATIN CALCIUM 10 MG/1
TABLET, COATED ORAL
Qty: 30 TABLET | Refills: 0 | OUTPATIENT
Start: 2022-01-15

## 2022-02-21 ENCOUNTER — PATIENT MESSAGE (OUTPATIENT)
Dept: PAIN MEDICINE | Facility: CLINIC | Age: 52
End: 2022-02-21

## 2022-02-21 NOTE — TELEPHONE ENCOUNTER
From: Bettye Camacho  To: Myra JavadDO  Sent: 2/21/2022 8:31 AM EST  Subject: Back pain    Since my last Epidural steroid injection pain has been manageable, I take Aleve usually in the AM to help  I have been experiencing numbness in my left leg, which occurs shortly after I start my work day  It last for about an hour and a half  I do not have this problem at home during my days off  I wanted to update you being Im experiencing the numbness and not sure what our next step should be       [place]

## 2022-02-23 ENCOUNTER — PATIENT MESSAGE (OUTPATIENT)
Dept: PAIN MEDICINE | Facility: CLINIC | Age: 52
End: 2022-02-23

## 2022-02-23 DIAGNOSIS — M54.16 LUMBAR RADICULOPATHY: Primary | ICD-10-CM

## 2022-02-28 ENCOUNTER — OFFICE VISIT (OUTPATIENT)
Dept: PAIN MEDICINE | Facility: CLINIC | Age: 52
End: 2022-02-28
Payer: COMMERCIAL

## 2022-02-28 VITALS
BODY MASS INDEX: 28.77 KG/M2 | HEIGHT: 70 IN | DIASTOLIC BLOOD PRESSURE: 72 MMHG | SYSTOLIC BLOOD PRESSURE: 136 MMHG | WEIGHT: 201 LBS | HEART RATE: 70 BPM

## 2022-02-28 DIAGNOSIS — M48.061 SPINAL STENOSIS OF LUMBAR REGION WITHOUT NEUROGENIC CLAUDICATION: ICD-10-CM

## 2022-02-28 DIAGNOSIS — M51.9 FORAMINAL STENOSIS DUE TO INTERVERTEBRAL DISC DISEASE: ICD-10-CM

## 2022-02-28 DIAGNOSIS — M54.16 LUMBAR RADICULOPATHY: Primary | ICD-10-CM

## 2022-02-28 DIAGNOSIS — M47.816 LUMBAR SPONDYLOSIS: ICD-10-CM

## 2022-02-28 DIAGNOSIS — M99.79 FORAMINAL STENOSIS DUE TO INTERVERTEBRAL DISC DISEASE: ICD-10-CM

## 2022-02-28 PROCEDURE — 99214 OFFICE O/P EST MOD 30 MIN: CPT | Performed by: NURSE PRACTITIONER

## 2022-02-28 RX ORDER — GABAPENTIN 100 MG/1
CAPSULE ORAL
Qty: 90 CAPSULE | Refills: 1 | Status: SHIPPED | OUTPATIENT
Start: 2022-02-28 | End: 2022-04-22

## 2022-02-28 NOTE — PROGRESS NOTES
Assessment:  1  Lumbar radiculopathy    2  Foraminal stenosis due to intervertebral disc disease    3  Lumbar spondylosis    4  Spinal stenosis of lumbar region without neurogenic claudication        Plan:  1  I will trial the patient on gabapentin 100 mg q h s  and titrate up to 300 mg q h s  for neuropathic complaints  Patient was educated on medications most common side effects which include dizziness, drowsiness, and swelling of the hands and feet  Patient was instructed to call the office with any adverse medication side effects  The patient is also aware that if they would like to come off of the medication, they need to let us know as suddenly stopping the medication puts them at risk to have a seizure  The patient was agreeable and verbalized an understanding  2  We can repeat left L3 TFESI p r n   3  Patient may continue Aleve p r n    4  Patient will continue with his home exercise program   5  Patient will follow-up in 4 weeks or sooner if needed    M*Modal software was used to dictate this note  It may contain errors with dictating incorrect words or incorrect spelling  Please contact the provider directly with any questions  History of Present Illness: The patient is a 45 y o  male last seen on 9/2/21 who presents for a follow up office visit in regards to chronic low back pain with radiculopathy into the left hip and occasionally left groin and thigh with associated paresthesias secondary to lumbar degenerative disc disease, lumbar spondylosis, lumbar stenosis, lumbar radiculopathy and chronic pain syndrome  The patient denies bowel or bladder incontinence or saddle anesthesia  Patient is status post left L3 TFESI on November 30, 2021 and continues with an ongoing 75% improvement of his pain at this time  He does still have some axial low back pain in the morning however it is currently manageable with over-the-counter Aleve and does improve throughout the day    He returns to the office with complaints of numbness in the same distribution as previous pain in the left leg  He states the numbness occurs daily for about 2-3 hours and eventually resolves on its own  The numbness is not improved with Aleve/    The patient rates his pain a 3/10 on the numeric pain rating scale  He intermittently has pain in the morning which is described as dull aching, pressure like and numbness      I have personally reviewed and/or updated the patient's past medical history, past surgical history, family history, social history, current medications, allergies, and vital signs today  Review of Systems:    Review of Systems   Respiratory: Negative for shortness of breath  Cardiovascular: Negative for chest pain  Gastrointestinal: Negative for constipation, diarrhea, nausea and vomiting  Musculoskeletal: Negative for arthralgias, gait problem, joint swelling and myalgias  Skin: Negative for rash  Neurological: Negative for dizziness, seizures and weakness  All other systems reviewed and are negative  Past Medical History:   Diagnosis Date    Arthritis     Constipation        Past Surgical History:   Procedure Laterality Date    COLONOSCOPY      [place] ESOPHAGOGASTRODUODENOSCOPY TRANSORAL DIAGNOSTIC N/A 10/18/2018    Procedure: EGD AND COLONOSCOPY;  Surgeon: Lavonne Anne DO;  Location: [facility];   Service: Gastroenterology    UPPER GASTROINTESTINAL ENDOSCOPY         Family History   Problem Relation Age of Onset    Pancreatic cancer Mother     Hypertension Mother        Social History     Occupational History    Not on file   Tobacco Use    Smoking status: Light Tobacco Smoker     Packs/day: 0 25    Smokeless tobacco: Never Used    Tobacco comment: cigars daily   Substance and Sexual Activity    Alcohol use: Yes     Comment: socially    Drug use: No    Sexual activity: Not on file         Current Outpatient Medications:     Amitiza 24 MCG capsule, TAKE 1 CAPSULE (24 MCG TOTAL) BY MOUTH 2 (TWO) TIMES A DAY WITH MEALS, Disp: 60 capsule, Rfl: 11    Cetirizine HCl (ZYRTEC ALLERGY) 10 MG CAPS, Take by mouth as needed , Disp: , Rfl:     clobetasol (TEMOVATE) 0 05 % external solution, APPLY TO AFFECTED AREA EVERY DAY, Disp: 50 mL, Rfl: 5    fluticasone (FLONASE ALLERGY RELIEF) 50 mcg/act nasal spray, as needed , Disp: , Rfl:     sildenafil (VIAGRA) 50 MG tablet, Take 1 tablet (50 mg total) by mouth daily as needed for erectile dysfunction, Disp: 10 tablet, Rfl: 5    calcipotriene (DOVONOX) 0 005 % ointment, Apply topically 2 (two) times a day Saturday and Sunday (Patient not taking: Reported on 10/5/2021), Disp: 60 g, Rfl: 6    gabapentin (NEURONTIN) 100 mg capsule, Take 1 PO HS x 5 days, then 2 PO HS x5 days, then 3 PO HS, Disp: 90 capsule, Rfl: 1    lisinopril (ZESTRIL) 10 mg tablet, TAKE 1 TABLET BY MOUTH EVERY DAY, Disp: 30 tablet, Rfl: 5    rosuvastatin (CRESTOR) 10 MG tablet, Take 1 tablet (10 mg total) by mouth daily, Disp: 30 tablet, Rfl: 0    triamcinolone (KENALOG) 0 1 % ointment, Apply topically 2 (two) times a day Monday thru Friday (Patient not taking: Reported on 9/2/2021), Disp: 80 g, Rfl: 6    No Known Allergies    Physical Exam:    /72   Pulse 70   Ht 5' 10" (1 778 m)   Wt 91 2 kg (201 lb)   BMI 28 84 kg/m²     Constitutional:normal, well developed, well nourished, alert, in no distress and non-toxic and no overt pain behavior  Eyes:anicteric  HEENT:grossly intact  Neck:supple, symmetric, trachea midline and no masses   Pulmonary:even and unlabored  Cardiovascular:No edema or pitting edema present  Skin:Normal without rashes or lesions and well hydrated  Psychiatric:Mood and affect appropriate  Neurologic:Cranial Nerves II-XII grossly intact  Musculoskeletal:normal gait      Imaging  No orders to display     MRI LUMBAR SPINE WITHOUT CONTRAST   INDICATION: M54 5: Low back pain   R10 32: Left lower quadrant pain   M54 16: Radiculopathy, lumbar region  COMPARISON: MR 9/6/2012   TECHNIQUE: Sagittal T1, sagittal T2, sagittal inversion recovery, axial T1 and axial T2, coronal T2    IMAGE QUALITY: Diagnostic   FINDINGS:   VERTEBRAL BODIES: There are 5 nonrib-bearing lumbar type vertebral bodies  Progression of degenerative changes most notably, at the L3-L4 level which results in asymmetric loss of disc height to the left and asymmetric left lateral marginal osteophytes   with increased leftward translation of L2 to on L3  Modic type I marrow changes to the left at L3-4  SACRUM: Normal signal within the sacrum  No evidence of insufficiency or stress fracture  DISTAL CORD AND CONUS: Normal size and signal within the distal cord and conus  Conus terminates at the caudal L1 level  Redundancy of the cauda equina at the L2-3 and L3-4 levels owing to canal stenosis  PARASPINAL SOFT TISSUES: Paraspinal soft tissues are unremarkable  3 4 cm cystic focus in the right lobe of liver, described on CT 7/3/2019   LOWER THORACIC DISC SPACES: Endplate degenerative changes T11-12  LUMBAR DISC SPACES:   L1-L2: Mild bilateral facet arthrosis   L2-L3: Relative leftward translation of L2 on L3 has increased, circumferential bulging the discs with the loss of disc height  The AP dimension of the sac reduced to estimated 6-7 mm  Much of the CSF within the sac is effaced  Moderate facet   arthrosis  Right lateral disc in contact with but not compressing post foraminal right L2 root  L3-L4: Further reduction disc height in the degenerative endplate changes asymmetric to the left  Circumferential bulge extending laterally, with foraminal protrusion in contact with the left L3 root  Correlate for left L3 radiculitis  AP dimension   of the sac reduced to 5-6 mm  Again there is redundancy of the cauda equina above this level  L4-L5: Right greater than left facet arthrosis, minor disc bulge, no compression  L5-S1: Mild circumferential bulge, mild bilateral facet arthrosis   Focal left posterolateral annular fissure and minor displacement of disc material, 6:23, 3:7  No definite S1 root compression  IMPRESSION:   Progression of degenerative changes as described most notably to the left at the L3-L4 level  Correlate for left L3 radiculitis, and signs and symptoms of spinal stenosis  No orders of the defined types were placed in this encounter

## 2022-02-28 NOTE — PATIENT INSTRUCTIONS
Gabapentin (By mouth)   Gabapentin (mariano-a-PEN-tin)  Treats seizures and pain caused by shingles  Brand Name(s): FusePaq Fanatrex, Neurontin   There may be other brand names for this medicine  When This Medicine Should Not Be Used: This medicine is not right for everyone  Do not use it if you had an allergic reaction to gabapentin  How to Use This Medicine:   Capsule, Liquid, Tablet  · Take your medicine as directed  Your dose may need to be changed several times to find what works best for you  If you have epilepsy, do not allow more than 12 hours to pass between doses  · Capsule: Swallow the capsule whole with plenty of water  Do not open, crush, or chew it  · Gralise® tablet: Swallow the tablet whole   Do not crush, break, or chew it  · Neurontin® tablet: If you break a tablet into 2 pieces, use the second half as your next dose  Do not use the half-tablet if the whole tablet has been cut or broken after 28 days  · Oral liquid: Measure the oral liquid medicine with a marked measuring spoon, oral syringe, or medicine cup  · This medicine should come with a Medication Guide  Ask your pharmacist for a copy if you do not have one  · Missed dose: Take a dose as soon as you remember  If it is almost time for your next dose, wait until then and take a regular dose  Do not take extra medicine to make up for a missed dose  · Store the medicine in a closed container at room temperature, away from heat, moisture, and direct light  Store the Neurontin® oral liquid in the refrigerator  Do not freeze  Drugs and Foods to Avoid:   Ask your doctor or pharmacist before using any other medicine, including over-the-counter medicines, vitamins, and herbal products  · Some medicines can affect how gabapentin works  Tell your doctor if you also using hydrocodone or morphine  · If you take an antacid, wait at least 2 hours before you take gabapentin  · Do not drink alcohol while you are using this medicine    · Tell your doctor if you use anything else that makes you sleepy  Some examples are allergy medicine, narcotic pain medicine, and alcohol  Tell your doctor if you are also using lorazepam, oxycodone, or zolpidem  Warnings While Using This Medicine:   · Tell your doctor if you are pregnant or breastfeeding, or if you have kidney problems (including patients receiving dialysis) or lung problems  Tell your doctor if you have a history of depression or mental health problems  · This medicine may cause the following problems:  ? Drug reaction with eosinophilia and systemic symptoms (DRESS) or multiorgan hypersensitivity, which may damage the liver, kidney, blood, heart, or muscles  ? Changes in mood or behavior, including suicidal thoughts or behavior  ? Respiratory depression (serious breathing problem that can be life-threatening), when used with narcotic pain medicines  · Do not stop using this medicine suddenly  Your doctor will need to slowly decrease your dose before you stop it completely  · This medicine may make you dizzy or drowsy  Do not drive or do anything else that could be dangerous until you know how this medicine affects you  · Tell any doctor or dentist who treats you that you are using this medicine  This medicine may affect certain medical test results  · Your doctor will check your progress and the effects of this medicine at regular visits  Keep all appointments  · Keep all medicine out of the reach of children  Never share your medicine with anyone    Possible Side Effects While Using This Medicine:   Call your doctor right away if you notice any of these side effects:  · Allergic reaction: Itching or hives, swelling in your face or hands, swelling or tingling in your mouth or throat, chest tightness, trouble breathing  · Behavior problems, aggression, restlessness, trouble concentrating, moodiness (especially in children)  · Blistering, peeling, red skin rash  · Blue lips, fingernails, or skin, chest pain, fast heartbeat, trouble breathing  · Change in how much or how often you urinate, bloody or cloudy urine  · Dark urine or pale stools, nausea, vomiting, loss of appetite, stomach pain, yellow skin or eyes  · Fever, chills, cough, sore throat, body aches  · Problems with coordination, shakiness, unsteadiness, unusual eye movement  · Rapid weight gain, swelling in your hands, ankles, or feet  · Rash, swollen or tender glands in the neck, armpit, or groin  · Unusual moods or behaviors, thoughts of hurting yourself, feeling depressed  If you notice these less serious side effects, talk with your doctor:   · Dizziness, drowsiness, sleepiness, tiredness  If you notice other side effects that you think are caused by this medicine, tell your doctor  Call your doctor for medical advice about side effects  You may report side effects to FDA at 4-842-FDA-2900    © Copyright [employer] [facility] 2022 Information is for End User's use only and may not be sold, redistributed or otherwise used for commercial purposes  The above information is an  only  It is not intended as medical advice for individual conditions or treatments  Talk to your doctor, nurse or pharmacist before following any medical regimen to see if it is safe and effective for you

## 2022-03-17 DIAGNOSIS — K58.1 IRRITABLE BOWEL SYNDROME WITH CONSTIPATION: ICD-10-CM

## 2022-03-17 RX ORDER — LUBIPROSTONE 24 UG/1
CAPSULE, GELATIN COATED ORAL
Qty: 60 CAPSULE | Refills: 11 | Status: SHIPPED | OUTPATIENT
Start: 2022-03-17 | End: 2022-03-18

## 2022-03-18 ENCOUNTER — OFFICE VISIT (OUTPATIENT)
Dept: GASTROENTEROLOGY | Facility: CLINIC | Age: 52
End: 2022-03-18
Payer: COMMERCIAL

## 2022-03-18 VITALS
OXYGEN SATURATION: 97 % | WEIGHT: 204 LBS | DIASTOLIC BLOOD PRESSURE: 68 MMHG | SYSTOLIC BLOOD PRESSURE: 118 MMHG | TEMPERATURE: 98.2 F | HEIGHT: 70 IN | HEART RATE: 70 BPM | BODY MASS INDEX: 29.2 KG/M2 | RESPIRATION RATE: 18 BRPM

## 2022-03-18 DIAGNOSIS — K58.1 IRRITABLE BOWEL SYNDROME WITH CONSTIPATION: Primary | ICD-10-CM

## 2022-03-18 DIAGNOSIS — R10.13 DYSPEPSIA: ICD-10-CM

## 2022-03-18 DIAGNOSIS — Z11.59 NEED FOR HEPATITIS C SCREENING TEST: ICD-10-CM

## 2022-03-18 DIAGNOSIS — Z12.11 COLON CANCER SCREENING: ICD-10-CM

## 2022-03-18 PROCEDURE — 4004F PT TOBACCO SCREEN RCVD TLK: CPT | Performed by: INTERNAL MEDICINE

## 2022-03-18 PROCEDURE — 99214 OFFICE O/P EST MOD 30 MIN: CPT | Performed by: INTERNAL MEDICINE

## 2022-03-18 PROCEDURE — 3008F BODY MASS INDEX DOCD: CPT | Performed by: INTERNAL MEDICINE

## 2022-03-18 NOTE — PROGRESS NOTES
[facility] - Outpatient Follow-up Note  Ernesto Abbott 45 y o  male MRN: [id number]  Encounter: [id number]          ASSESSMENT AND PLAN:      1  Irritable bowel syndrome with constipation  -continue amitizia bid  -continue to drink water     2  Dyspepsia  -EGD in 2018 with negative stomach and small biopsies   -start with a trial of tums or rolaids over the counter; but if not better try pepcid (famotidine) 20 mg at night time or after work when getting home     3  Colon cancer screening  -normal colon in 2018; no family history of colon cancer; next colonoscopy due in     4  Need for hepatitis C screening test  Neg in     5  Family history of pancreas cancer in his mom;  in her 66's  -ct was negative in     6  Liver cyst  -this is benign; reassurance [id number]    Follow up in four to six months   ___________________________________    SUBJECTIVE:  45year old male here for follow up  Currently taking amitiza 24 mcg BID and helps him have a BM a few days a week  Overall better with his symptoms  Mom had pancreas cancer and  in her 66's  Has gained a few lbs  REVIEW OF SYSTEMS IS OTHERWISE NEGATIVE  Historical Information   Past Medical History:   Diagnosis Date    Arthritis     Constipation      Past Surgical History:   Procedure Laterality Date    COLONOSCOPY      [place] ESOPHAGOGASTRODUODENOSCOPY TRANSORAL DIAGNOSTIC N/A 10/18/2018    Procedure: EGD AND COLONOSCOPY;  Surgeon: Gurwinder Infante DO;  Location: [facility];   Service: Gastroenterology    UPPER GASTROINTESTINAL ENDOSCOPY       Social History   Social History     Substance and Sexual Activity   Alcohol Use Yes    Comment: socially     Social History     Substance and Sexual Activity   Drug Use No     Social History     Tobacco Use   Smoking Status Light Tobacco Smoker    Packs/day: 0 25   Smokeless Tobacco Never Used   Tobacco Comment    cigars daily     Family History   Problem Relation Age of Onset    Pancreatic cancer Mother     Hypertension Mother      Meds/Allergies     Current Outpatient Medications:     Amitiza 24 MCG capsule    Cetirizine HCl (ZYRTEC ALLERGY) 10 MG CAPS    clobetasol (TEMOVATE) 0 05 % external solution    fluticasone (FLONASE ALLERGY RELIEF) 50 mcg/act nasal spray    gabapentin (NEURONTIN) 100 mg capsule    lisinopril (ZESTRIL) 10 mg tablet    sildenafil (VIAGRA) 50 MG tablet    calcipotriene (DOVONOX) 0 005 % ointment    rosuvastatin (CRESTOR) 10 MG tablet    triamcinolone (KENALOG) 0 1 % ointment    No Known Allergies    Objective     There were no vitals taken for this visit  There is no height or weight on file to calculate BMI  PHYSICAL EXAM:      General Appearance:   Alert, cooperative, no distress   HEENT:   Normocephalic, atraumatic, anicteric      Neck:  Supple, symmetrical, trachea midline   Lungs:   Clear to auscultation bilaterally; no rales, rhonchi or wheezing; respirations unlabored    Heart[de-identified]   Regular rate and rhythm; no murmur, rub, or gallop  Abdomen:   Soft, non-tender, non-distended; normal bowel sounds; no masses, no organomegaly    Genitalia:   Deferred    Rectal:   Deferred    Extremities:  No cyanosis, clubbing or edema    Pulses:  2+ and symmetric    Skin:  No jaundice, rashes, or lesions    Lymph nodes:  No palpable cervical lymphadenopathy        Lab Results:   No visits with results within 1 Day(s) from this visit  Latest known visit with results is:   Orders Only on 03/20/2020   Component Date Value    HEP C AB 03/20/2020 NEGATIVE     HIV Screen 03/20/2020 Non-Reactive          Radiology Results:   No results found    Answers for HPI/ROS submitted by the patient on 3/17/2022  Chronicity: recurrent  Onset: more than 1 year ago  Onset quality: undetermined  Frequency: daily  Episode duration: 1 hours  Progression since onset: waxing and waning  Pain location: epigastric region  Pain - numeric: 2/10  Pain quality: burning, dull, a sensation of fullness  Radiates to: does not radiate  anorexia: Yes  arthralgias: No  belching: Yes  constipation: Yes  diarrhea: Yes  dysuria: No  fever: No  flatus: Yes  frequency: No  headaches: No  hematochezia: No  hematuria: No  melena: No  myalgias: No  nausea: Yes  weight loss: No  vomiting: No  Aggravated by: being still, certain positions, eating  Relieved by: nothing

## 2022-03-25 DIAGNOSIS — K58.1 IRRITABLE BOWEL SYNDROME WITH CONSTIPATION: ICD-10-CM

## 2022-03-28 NOTE — TELEPHONE ENCOUNTER
Called [facility] and informed amitiza needs prior auth however they have different insurance for pt  Called pt and verified he is using [employer], informed to update insurance with [facility] so they can process [place]  If medication still needs prior auth with new insurance advised to call office so we can start prior auth process   Pt verbalized understanding,

## 2022-04-22 RX ORDER — GABAPENTIN 300 MG/1
300 CAPSULE ORAL 3 TIMES DAILY
Qty: 90 CAPSULE | Refills: 1 | Status: SHIPPED | OUTPATIENT
Start: 2022-04-22

## 2022-04-22 NOTE — TELEPHONE ENCOUNTER
If patient isn't having side effects, okay to increase to 300mg BID  If he's on this medication for a week or so without further relief or side effects, can increase to TID  Let me know if he needs a refill

## 2022-04-24 DIAGNOSIS — I10 ESSENTIAL HYPERTENSION: ICD-10-CM

## 2022-04-25 RX ORDER — LISINOPRIL 10 MG/1
TABLET ORAL
Qty: 30 TABLET | Refills: 5 | Status: SHIPPED | OUTPATIENT
Start: 2022-04-25

## 2022-11-04 DIAGNOSIS — I10 ESSENTIAL HYPERTENSION: ICD-10-CM

## 2022-11-05 RX ORDER — LISINOPRIL 10 MG/1
TABLET ORAL
Qty: 30 TABLET | Refills: 5 | Status: SHIPPED | OUTPATIENT
Start: 2022-11-05

## 2022-12-02 DIAGNOSIS — I10 ESSENTIAL HYPERTENSION: ICD-10-CM

## 2022-12-02 RX ORDER — LISINOPRIL 10 MG/1
TABLET ORAL
Qty: 90 TABLET | Refills: 2 | Status: SHIPPED | OUTPATIENT
Start: 2022-12-02

## 2023-02-10 DIAGNOSIS — L40.9 SCALP PSORIASIS: ICD-10-CM

## 2023-02-10 RX ORDER — CLOBETASOL PROPIONATE 0.46 MG/ML
SOLUTION TOPICAL
Qty: 50 ML | Refills: 5 | Status: SHIPPED | OUTPATIENT
Start: 2023-02-10

## 2023-03-09 ENCOUNTER — TELEPHONE (OUTPATIENT)
Dept: FAMILY MEDICINE CLINIC | Facility: CLINIC | Age: 53
End: 2023-03-09

## 2023-03-09 DIAGNOSIS — Z12.5 SCREENING FOR PROSTATE CANCER: ICD-10-CM

## 2023-03-09 DIAGNOSIS — R53.83 OTHER FATIGUE: ICD-10-CM

## 2023-03-09 DIAGNOSIS — E78.00 PURE HYPERCHOLESTEROLEMIA: Primary | ICD-10-CM

## 2023-03-09 DIAGNOSIS — Z13.1 SCREENING FOR DIABETES MELLITUS: ICD-10-CM

## 2023-03-09 NOTE — TELEPHONE ENCOUNTER
Patient inquiring if there is any labs you would want to order for his physical scheduled on 3/16      Please advise

## 2023-03-16 ENCOUNTER — OFFICE VISIT (OUTPATIENT)
Dept: FAMILY MEDICINE CLINIC | Facility: CLINIC | Age: 53
End: 2023-03-16

## 2023-03-16 VITALS
HEART RATE: 80 BPM | BODY MASS INDEX: 28.53 KG/M2 | TEMPERATURE: 98.4 F | HEIGHT: 69 IN | OXYGEN SATURATION: 98 % | WEIGHT: 192.6 LBS | DIASTOLIC BLOOD PRESSURE: 78 MMHG | RESPIRATION RATE: 16 BRPM | SYSTOLIC BLOOD PRESSURE: 140 MMHG

## 2023-03-16 DIAGNOSIS — K58.1 IRRITABLE BOWEL SYNDROME WITH CONSTIPATION: ICD-10-CM

## 2023-03-16 DIAGNOSIS — Z13.1 SCREENING FOR DIABETES MELLITUS: ICD-10-CM

## 2023-03-16 DIAGNOSIS — Z00.00 ANNUAL PHYSICAL EXAM: Primary | ICD-10-CM

## 2023-03-16 DIAGNOSIS — E78.00 PURE HYPERCHOLESTEROLEMIA: ICD-10-CM

## 2023-03-16 DIAGNOSIS — N52.9 ERECTILE DYSFUNCTION, UNSPECIFIED ERECTILE DYSFUNCTION TYPE: ICD-10-CM

## 2023-03-16 DIAGNOSIS — I10 ESSENTIAL HYPERTENSION: ICD-10-CM

## 2023-03-16 RX ORDER — LISINOPRIL 10 MG/1
10 TABLET ORAL DAILY
Qty: 90 TABLET | Refills: 1 | Status: SHIPPED | OUTPATIENT
Start: 2023-03-16

## 2023-03-16 RX ORDER — TADALAFIL 5 MG/1
5 TABLET ORAL DAILY PRN
Qty: 10 TABLET | Refills: 5 | Status: SHIPPED | OUTPATIENT
Start: 2023-03-16

## 2023-03-16 RX ORDER — ROSUVASTATIN CALCIUM 10 MG/1
10 TABLET, COATED ORAL DAILY
Qty: 90 TABLET | Refills: 1 | Status: SHIPPED | OUTPATIENT
Start: 2023-03-16 | End: 2023-04-15

## 2023-03-16 NOTE — PROGRESS NOTES
[facility]    NAME: Tito Brown  AGE: 52 y o  SEX: male  : 1970     DATE: 3/16/2023     Assessment and Plan:     Problem List Items Addressed This Visit        Digestive    Irritable bowel syndrome with constipation   Other Visit Diagnoses     Annual physical exam    -  Primary    Jessica Batch appears well  He is to continue a lower carb/saturated fat diet, and regular exercise  FBW ordered - repeat prior to next OV  Essential hypertension        BP up mildly - restart low dose Lisinopril  Relevant Medications    lisinopril (ZESTRIL) 10 mg tablet    Pure hypercholesterolemia        Restarting Rosuvastatin  Relevant Medications    rosuvastatin (CRESTOR) 10 MG tablet    Other Relevant Orders    Lipid Panel with Direct LDL reflex    Screening for diabetes mellitus        Relevant Orders    Comprehensive metabolic panel    BMI 43 8-32 6,[id number]        Diet and exercise as above  Erectile dysfunction, unspecified erectile dysfunction type        Ordering Tadalafil  Relevant Medications    tadalafil (CIALIS) 5 MG tablet          Immunizations and preventive care screenings were discussed with patient today  Appropriate education was printed on patient's after visit summary  Discussed risks and benefits of prostate cancer screening  We discussed the controversial history of PSA screening for prostate cancer in the [place] as well as the risk of over detection and over treatment of prostate cancer by way of PSA screening  The patient understands that PSA blood testing is an imperfect way to screen for prostate cancer and that elevated PSA levels in the blood may also be caused by infection, inflammation, prostatic trauma or manipulation, urological procedures, or by benign prostatic enlargement      The role of the digital rectal examination in prostate cancer screening was also discussed and I discussed with him that there is large interobserver variability in the findings of digital rectal examination  Counseling:  Dental Health: discussed importance of regular tooth brushing, flossing, and dental visits  · Exercise: the importance of regular exercise/physical activity was discussed  Recommend exercise 3-5 times per week for at least 30 minutes  BMI Counseling: Body mass index is 28 62 kg/m²  The BMI is above normal  Nutrition recommendations include moderation in carbohydrate intake and reducing intake of saturated and trans fat  Exercise recommendations include exercising 3-5 times per week  No pharmacotherapy was ordered  Patient referred to PCP  Rationale for BMI follow-up plan is due to patient being overweight or obese  Depression Screening and Follow-up Plan: Patient was screened for depression during today's encounter  They screened negative with a PHQ-2 score of 0  Tobacco Cessation Counseling: The patient is sincerely urged to quit consumption of tobacco  He is not ready to quit tobacco  Pt only has an occasional cigar  Never was a cigarette smoker  Return in 6 months (on 9/16/2023) for Recheck  Chief Complaint:     Chief Complaint   Patient presents with   • Physical Exam     Patient is here today for an annual exam       History of Present Illness:     Adult Annual Physical   Patient here for a comprehensive physical exam  The patient reports no problems  Seeing Tressa Dawson for the first time since 12/2020  Seeing Dermatology  Had a period of time working with PT / Pain Management (FORTUNATO x 2) / Neurosurgery (no surgery)  Had colonoscopy in 2018 - due 2028  Pt ran out of meds - could not refill until he followed-up  Recent labs reviewed - Gluc 76, , , PSA 0 59  Diet and Physical Activity  · Diet/Nutrition: limited junk food  · Exercise: walking        Depression Screening  PHQ-2/9 Depression Screening    Little interest or pleasure in doing things: 0 - not at all  Feeling down, depressed, or hopeless: 0 - not at all  PHQ-2 Score: 0  PHQ-2 Interpretation: Negative depression screen       General Health  · Sleep: sleeps well  · Hearing: normal - bilateral   · Vision: no vision problems  · Dental: no dental visits for >1 year  Discussed   Health  · Symptoms include: none     Review of Systems:     Review of Systems   Constitutional: Negative for activity change  Respiratory: Negative for shortness of breath  Cardiovascular: Negative for chest pain  Gastrointestinal: Negative for abdominal pain and blood in stool  Genitourinary: Negative for decreased urine volume and difficulty urinating  Musculoskeletal: Negative for back pain and myalgias  Psychiatric/Behavioral: Negative for dysphoric mood  The patient is not nervous/anxious  Past Medical History:     Past Medical History:   Diagnosis Date   • Arthritis    • Constipation       Past Surgical History:     Past Surgical History:   Procedure Laterality Date   • COLONOSCOPY     • [place] ESOPHAGOGASTRODUODENOSCOPY TRANSORAL DIAGNOSTIC N/A 10/18/2018    Procedure: EGD AND COLONOSCOPY;  Surgeon: Martha Eldridge DO;  Location: [facility];   Service: Gastroenterology   • UPPER GASTROINTESTINAL ENDOSCOPY        Family History:     Family History   Problem Relation Age of Onset   • Pancreatic cancer Mother    • Hypertension Mother       Social History:     Social History     Socioeconomic History   • Marital status: /Civil Union     Spouse name: None   • Number of children: None   • Years of education: None   • Highest education level: None   Occupational History   • None   Tobacco Use   • Smoking status: Light Smoker     Packs/day: 0 25     Types: Cigarettes   • Smokeless tobacco: Never   • Tobacco comments:     cigars daily   Substance and Sexual Activity   • Alcohol use: Yes     Comment: socially   • Drug use: No   • Sexual activity: None   Other Topics Concern   • None   Social History Narrative   • None     Social Determinants of Health     Financial Resource Strain: Not on file   Food Insecurity: Not on file   Transportation Needs: Not on file   Physical Activity: Not on file   Stress: Not on file   Social Connections: Not on file   Intimate Partner Violence: Not on file   Housing Stability: Not on file      Current Medications:     Current Outpatient Medications   Medication Sig Dispense Refill   • Cetirizine HCl 10 MG CAPS Take by mouth as needed      • clobetasol (TEMOVATE) 0 05 % external solution APPLY TOPICALLY TO AFFECTED AREA EVERY DAY 50 mL 5   • fluticasone (FLONASE) 50 mcg/act nasal spray as needed      • lisinopril (ZESTRIL) 10 mg tablet Take 1 tablet (10 mg total) by mouth daily 90 tablet 1   • lubiprostone (AMITIZA) 24 mcg capsule TAKE 1 CAPSULE BY MOUTH 2 TIMES A DAY WITH MEALS  180 capsule 3   • rosuvastatin (CRESTOR) 10 MG tablet Take 1 tablet (10 mg total) by mouth daily 90 tablet 1   • tadalafil (CIALIS) 5 MG tablet Take 1 tablet (5 mg total) by mouth daily as needed for erectile dysfunction 10 tablet 5     No current facility-administered medications for this visit  Allergies:     No Known Allergies   Physical Exam:     /78 (BP Location: Left arm, Patient Position: Sitting, Cuff Size: Adult)   Pulse 80   Temp 98 4 °F (36 9 °C) (Tympanic)   Resp 16   Ht 5' 8 78" (1 747 m)   Wt 87 4 kg (192 lb 9 6 oz)   SpO2 98%   BMI 28 62 kg/m²     Physical Exam  Vitals and nursing note reviewed  Constitutional:       General: He is not in acute distress  Appearance: Normal appearance  He is not ill-appearing, toxic-appearing or diaphoretic  HENT:      Head: Normocephalic and atraumatic  Right Ear: Tympanic membrane, ear canal and external ear normal       Left Ear: Tympanic membrane, ear canal and external ear normal       Mouth/Throat:      Mouth: Mucous membranes are moist       Pharynx: Oropharynx is clear   No oropharyngeal exudate or posterior oropharyngeal erythema  Eyes:      General: No scleral icterus  Conjunctiva/sclera: Conjunctivae normal    Cardiovascular:      Rate and Rhythm: Normal rate and regular rhythm  Heart sounds: Normal heart sounds  No murmur heard  No friction rub  No gallop  Pulmonary:      Effort: Pulmonary effort is normal  No respiratory distress  Breath sounds: Normal breath sounds  No stridor  No wheezing, rhonchi or rales  Abdominal:      General: Abdomen is flat  Bowel sounds are normal  There is no distension  Palpations: Abdomen is soft  There is no mass  Tenderness: There is no abdominal tenderness  There is no guarding or rebound  Genitourinary:     Prostate: Normal       Rectum: Normal       Comments: No gross blood on the examining finger  Musculoskeletal:      Cervical back: Normal range of motion and neck supple  No rigidity or tenderness  Lymphadenopathy:      Cervical: No cervical adenopathy  Neurological:      Mental Status: He is alert and oriented to person, place, and time  Psychiatric:         Mood and Affect: Mood normal          Behavior: Behavior normal          Thought Content: Thought content normal          Judgment: Judgment normal           Job Luis Fernando was seen today for physical exam     Diagnoses and all orders for this visit:    Annual physical exam  Comments:  Job Luis Fernando appears well  He is to continue a lower carb/saturated fat diet, and regular exercise  FBW ordered - repeat prior to next OV  Irritable bowel syndrome with constipation  Comments:  Stable - sees GI  Essential hypertension  Comments:  BP up mildly - restart low dose Lisinopril  Orders:  -     lisinopril (ZESTRIL) 10 mg tablet; Take 1 tablet (10 mg total) by mouth daily    Pure hypercholesterolemia  Comments:  Restarting Rosuvastatin  Orders:  -     Lipid Panel with Direct LDL reflex; Future  -     rosuvastatin (CRESTOR) 10 MG tablet;  Take 1 tablet (10 mg total) by mouth daily    Screening for diabetes mellitus  -     Comprehensive metabolic panel; Future    BMI 28 0-28 9,adult  Comments:  Diet and exercise as above  Erectile dysfunction, unspecified erectile dysfunction type  Comments:  Ordering Tadalafil  Orders:  -     tadalafil (CIALIS) 5 MG tablet;  Take 1 tablet (5 mg total) by mouth daily as needed for erectile dysfunction          Grupo Soriano, DO  [address]

## 2023-04-08 DIAGNOSIS — K58.1 IRRITABLE BOWEL SYNDROME WITH CONSTIPATION: ICD-10-CM

## 2023-04-08 RX ORDER — LUBIPROSTONE 24 UG/1
CAPSULE, GELATIN COATED ORAL
Qty: 60 CAPSULE | Refills: 11 | Status: SHIPPED | OUTPATIENT
Start: 2023-04-08

## 2023-04-18 NOTE — PROGRESS NOTES
[facility] - Outpatient Follow-up Note  [facility] 46 y o  male MRN: [id number]  Encounter: [id number]          ASSESSMENT AND PLAN:      1  Chronic idiopathic constipation:  Patient reports ongoing constipation, and states that he has gone without a bowel movement for 1 week at a time on occasion, with 3 bowel movements reported on 02/12/2019  The patient denies nausea vomiting or diarrhea  Patient endorses having flatus  The etiology of patient's constipation is unclear at present time, and reassuring the patient had a normal colonoscopy in 10/18 and EGD in 10/18 at which time biopsies were negative for H  Pylori and Celiac Disease  Etiologies include low fiber diet vs  poor fluid intake vs  IBS vs  Hypothyroidism   - start daily citrucel  - start miralax 1 capful daily and titrate to effect per d/w pt  - high fiber diet and encourage oral hydration  - pt encouraged to call GI office in 1 week if citrucel and miralax not helping constipation; could consider Linzess if aforementioned therapies ineffective  - TSH, 3rd generation with Free T4 reflex; Future    2  Bloating:  Patient reports bloating in the morning and throughout the day after p  O  Intake  Patient also reports early satiety in the setting bloating  Given her forehead history of constipation, bloating and early satiety likely secondary to stool burden  - treat constipation as above  - pt encouraged to contact GI office if bloating does not resolve    3  Leukopenia:  Patient with previously reported leukopenia per notes, and states that he is transitioned his care from [facility] to a [facility] PCP on [facility]    Patient states that he is aware that he needs to have a repeat CBC, was provided with the PCP practice for number prior team today, and states that he will call the PCPs practice to schedule the 1st available appointment for repeat CBC   - Recommended repeat CBC by ______________________________________________________________________    SUBJECTIVE:  Kinza Brown is a 66-year-old male past medical history of dyspepsia and constipation presents with persistent abdominal bloating has been going on for several months as well as constipation  Patient reports that he has had intermittent constipation since his teen years, and states that he at times does not have abdominal pain for 1 week  Patient reports that his last bowel movement was on 02/12/2019, and states that he had 3 bowel movements on that date that were soft  Patient states that he has intermittent abdominal bloating that is very bothersome for him, most prominent in the morning  Patient states that as a result of his bloating, he wakes up feeling full, and states that it takes several hours for that bloating feeling to dissipate  Patient reports that he has been eating smaller meals due to bloating after p o  Intake  Patient reports that he has been hydrating with water each day and feels that he is well hydrated  Patient states that his diet has been relatively low fiber in the setting of social stressors related to his father-in-law's recent hospitalization and rehabilitation over the past several months  Patient reports that his average [place] stool Scale score is 4  Patient denies diarrhea, nausea, vomiting, fevers, chills, abdominal pain, or weight loss  Patient denies any sensation of acid reflux, sour taste in his mouth, globus sensation, or esophageal discomfort  Patient reports smoking a cigar occasionally, and states that recently he has been drinking socially, but not every day  Patient states that when he is drinking more frequently, he drinks 2-3 beers per day, often in the summertime  REVIEW OF SYSTEMS IS OTHERWISE NEGATIVE        Historical Information   Past Medical History:   Diagnosis Date    Arthritis      Past Surgical History:   Procedure Laterality Date    COLONOSCOPY      [place] ESOPHAGOGASTRODUODENOSCOPY TRANSORAL DIAGNOSTIC N/A 10/18/2018    Procedure: EGD AND COLONOSCOPY;  Surgeon: Rigo Horta DO;  Location: [facility]; Service: Gastroenterology     Social History   Social History     Substance and Sexual Activity   Alcohol Use Yes    Frequency: 2-4 times a month    Comment: socially     Social History     Substance and Sexual Activity   Drug Use No     Social History     Tobacco Use   Smoking Status Light Tobacco Smoker    Packs/day: 0 25   Smokeless Tobacco Never Used   Tobacco Comment    cigars daily     History reviewed  No pertinent family history  Meds/Allergies       Current Outpatient Medications:     Cetirizine HCl (ZYRTEC ALLERGY) 10 MG CAPS    No Known Allergies        Objective     Blood pressure 148/89, pulse 76, temperature 98 6 °F (37 °C), temperature source Tympanic, height 5' 10" (1 778 m), weight 91 6 kg (202 lb)  Body mass index is 28 98 kg/m²  PHYSICAL EXAM:      General Appearance:   Alert, cooperative, no distress   HEENT:   Normocephalic, atraumatic, anicteric    Neck:  Supple, symmetrical, trachea midline   Lungs:   Clear to auscultation bilaterally; no rales, rhonchi or wheezing; respirations unlabored    Heart[de-identified]   Regular rate and rhythm; no murmurs, rubs, or gallops   Abdomen:   Soft, non-tender, non-distended; normal bowel sounds; no masses, no organomegaly    Genitalia:   Deferred    Rectal:   Deferred    Extremities:  No cyanosis, clubbing or edema    Pulses:  2+ and symmetric    Skin:  No jaundice, rashes, or lesions visualized          Lab Results:   No visits with results within 1 Day(s) from this visit     Latest known visit with results is:   Admission on 10/18/2018, Discharged on 10/18/2018   Component Date Value    Case Report 10/18/2018                      Value:Surgical Pathology Report                         Case: [id number]                                   Authorizing Provider:  Rigo Horta DO        Collected: 10/18/2018 1102              Ordering Location:     [address] [place] [address] End        Received:            10/18/2018 [address]  [facility] [place]  Endoscopy                                                     Pathologist:           Milo Sanches,                                                             Specimens:   A) - Duodenum, Duodenum r/o celiac Normal                                                           B) - Stomach, Stomach r/o h pylori Normal                                                  Final Diagnosis 10/18/2018                      Value: This result contains rich text formatting which cannot be displayed here   Additional Information 10/18/2018                      Value: This result contains rich text formatting which cannot be displayed here  Ardeth Needs Gross Description 10/18/2018                      Value: This result contains rich text formatting which cannot be displayed here  Radiology Results:   No results found  Recommendations (Free Text): Cetaphil gentle cleanser and Aquaphor for lips- samples provided today Detail Level: Zone Render Risk Assessment In Note?: no Recommendation Preamble: The following recommendations were made during the visit:

## 2023-05-24 ENCOUNTER — TRANSCRIBE ORDERS (OUTPATIENT)
Dept: GASTROENTEROLOGY | Facility: CLINIC | Age: 53
End: 2023-05-24

## 2023-09-18 ENCOUNTER — OFFICE VISIT (OUTPATIENT)
Dept: FAMILY MEDICINE CLINIC | Facility: CLINIC | Age: 53
End: 2023-09-18
Payer: COMMERCIAL

## 2023-09-18 VITALS
HEIGHT: 69 IN | OXYGEN SATURATION: 97 % | RESPIRATION RATE: 16 BRPM | BODY MASS INDEX: 28.56 KG/M2 | SYSTOLIC BLOOD PRESSURE: 112 MMHG | WEIGHT: 192.8 LBS | DIASTOLIC BLOOD PRESSURE: 76 MMHG | TEMPERATURE: 97.1 F | HEART RATE: 57 BPM

## 2023-09-18 DIAGNOSIS — Z13.220 ENCOUNTER FOR LIPID SCREENING FOR CARDIOVASCULAR DISEASE: ICD-10-CM

## 2023-09-18 DIAGNOSIS — E78.00 PURE HYPERCHOLESTEROLEMIA: ICD-10-CM

## 2023-09-18 DIAGNOSIS — Z13.6 ENCOUNTER FOR LIPID SCREENING FOR CARDIOVASCULAR DISEASE: ICD-10-CM

## 2023-09-18 DIAGNOSIS — I10 PRIMARY HYPERTENSION: ICD-10-CM

## 2023-09-18 DIAGNOSIS — K58.1 IRRITABLE BOWEL SYNDROME WITH CONSTIPATION: ICD-10-CM

## 2023-09-18 DIAGNOSIS — L40.9 SCALP PSORIASIS: Primary | ICD-10-CM

## 2023-09-18 DIAGNOSIS — I10 ESSENTIAL HYPERTENSION: ICD-10-CM

## 2023-09-18 DIAGNOSIS — E78.5 DYSLIPIDEMIA: ICD-10-CM

## 2023-09-18 DIAGNOSIS — Z12.5 PROSTATE CANCER SCREENING: ICD-10-CM

## 2023-09-18 PROCEDURE — 99213 OFFICE O/P EST LOW 20 MIN: CPT | Performed by: FAMILY MEDICINE

## 2023-09-18 RX ORDER — LISINOPRIL 10 MG/1
10 TABLET ORAL DAILY
Qty: 30 TABLET | Refills: 5 | Status: SHIPPED | OUTPATIENT
Start: 2023-09-18

## 2023-09-18 RX ORDER — ROSUVASTATIN CALCIUM 10 MG/1
10 TABLET, COATED ORAL DAILY
Qty: 30 TABLET | Refills: 5 | Status: SHIPPED | OUTPATIENT
Start: 2023-09-18

## 2023-09-18 NOTE — PROGRESS NOTES
Name: Brittanie Burgos      : 1970      MRN: [id number]  Encounter Provider: Khushi Eldridge DO  Encounter Date: 2023   Encounter department: [facility]     1. Scalp psoriasis  Assessment & Plan:  - Stable. Has been using clobetasol solution on his scalp when needed. Over-the-counter cortisone cream seems to help any other spots. 2. Irritable bowel syndrome with constipation  Assessment & Plan:  Stable on the Amitiza. Having bowel movement approximately every other day. Continue following with GI as scheduled    Orders:  -     Comprehensive metabolic panel; Future    3. Dyslipidemia  Assessment & Plan:  - Discussed healthier eating habits, avoid saturated fats/trans fats, processed foods, lower carbs and sugar. Discussed exercise recommendations.  -We will repeat lipid panel prior to follow-up    Orders:  -     Lipid Panel with Direct LDL reflex; Future    4. Encounter for lipid screening for cardiovascular disease  -     Lipid Panel with Direct LDL reflex; Future    5. Prostate cancer screening  -     PSA, Total Screen; Future    6. Primary hypertension  Assessment & Plan:  Blood Pressure: 112/76    Stable. Continue lisinopril 10 mg p.o. daily  Continue low-salt/low-carb diet as discussed. Continue exercise recommendations as discussed  Follow-up in 6 months or sooner as needed. Subjective     Chanel Ordaz is a 64-year-old male with a past medical history of IBS-C, dyslipidemia, Psoriasis who presents today for follow-up. He notes his IBS-C is well controlled with Amitiza. He has been using clobetasol cream on his psoriasis of her scalp which is much improved. Denies any recent flareups. He has been trying to increase his activity lately. He does admit that dietary choices could be improved on occasion. He is taking all of his other medications as prescribed without any side effects. Denies any other concerns at this time.     Review of Systems   Constitutional: Negative for chills and fever. HENT: Negative for ear pain, postnasal drip, rhinorrhea and sore throat. Eyes: Negative for pain and visual disturbance. Respiratory: Negative for cough and shortness of breath. Cardiovascular: Negative for chest pain and palpitations. Gastrointestinal: Negative for abdominal pain and vomiting. Genitourinary: Negative for dysuria and hematuria. Musculoskeletal: Negative for arthralgias and back pain. Skin: Negative for color change and rash. Neurological: Negative for dizziness, seizures, syncope, weakness and headaches. Psychiatric/Behavioral: Negative for confusion and sleep disturbance. The patient is not nervous/anxious. All other systems reviewed and are negative. Past Medical History:   Diagnosis Date   • Allergic    • Arthritis    • Constipation      Past Surgical History:   Procedure Laterality Date   • COLONOSCOPY     • [place] ESOPHAGOGASTRODUODENOSCOPY TRANSORAL DIAGNOSTIC N/A 10/18/2018    Procedure: EGD AND COLONOSCOPY;  Surgeon: Madhavi Pimentel DO;  Location: [facility]; Service: Gastroenterology   • UPPER GASTROINTESTINAL ENDOSCOPY       Family History   Problem Relation Age of Onset   • Pancreatic cancer Mother    • Hypertension Mother    • Diabetes Mother    • COPD Mother    • Arthritis Mother    • Cancer Mother      Social History     Socioeconomic History   • Marital status: /Civil Union     Spouse name: None   • Number of children: None   • Years of education: None   • Highest education level: None   Occupational History   • None   Tobacco Use   • Smoking status: Some Days     Types: Cigars   • Smokeless tobacco: Never   • Tobacco comments:     cigars daily   Vaping Use   • Vaping Use: Never used   Substance and Sexual Activity   • Alcohol use:  Yes     Alcohol/week: 12.0 standard drinks of alcohol     Types: 12 Cans of beer per week     Comment: socially   • Drug use: Never   • Sexual activity: Yes Partners: Female     Birth control/protection: Male Sterilization, None   Other Topics Concern   • None   Social History Narrative   • None     Social Determinants of Health     Financial Resource Strain: Not on file   Food Insecurity: Not on file   Transportation Needs: Not on file   Physical Activity: Not on file   Stress: Not on file   Social Connections: Not on file   Intimate Partner Violence: Not on file   Housing Stability: Not on file     Current Outpatient Medications on File Prior to Visit   Medication Sig   • Amitiza 24 MCG capsule TAKE 1 CAPSULE BY MOUTH 2 TIMES A DAY WITH MEALS. • Cetirizine HCl 10 MG CAPS Take by mouth as needed    • clobetasol (TEMOVATE) 0.05 % external solution APPLY TOPICALLY TO AFFECTED AREA EVERY DAY   • fluticasone (FLONASE) 50 mcg/act nasal spray as needed    • tadalafil (CIALIS) 5 MG tablet Take 1 tablet (5 mg total) by mouth daily as needed for erectile dysfunction     No Known Allergies  Immunization History   Administered Date(s) Administered   • Tdap 05/22/2019       Objective     /76 (BP Location: Left arm, Patient Position: Sitting, Cuff Size: Large)   Pulse 57   Temp (!) 97.1 °F (36.2 °C) (Tympanic)   Resp 16   Ht 5' 8.78" (1.747 m)   Wt 87.5 kg (192 lb 12.8 oz)   SpO2 97%   BMI 28.65 kg/m²     Physical Exam  Vitals and nursing note reviewed. Constitutional:       General: He is not in acute distress. Appearance: Normal appearance. He is not ill-appearing. HENT:      Head: Normocephalic and atraumatic. Right Ear: Tympanic membrane and external ear normal.      Left Ear: Tympanic membrane normal.      Nose: Nose normal. No congestion. Mouth/Throat:      Mouth: Mucous membranes are moist.      Pharynx: No oropharyngeal exudate. Eyes:      Extraocular Movements: Extraocular movements intact. Conjunctiva/sclera: Conjunctivae normal.      Pupils: Pupils are equal, round, and reactive to light.    Cardiovascular:      Rate and Rhythm: Normal rate and regular rhythm. Pulses: Normal pulses. Heart sounds: Normal heart sounds. No murmur heard. Pulmonary:      Effort: Pulmonary effort is normal.      Breath sounds: Normal breath sounds. No wheezing, rhonchi or rales. Abdominal:      General: Bowel sounds are normal.      Palpations: Abdomen is soft. Tenderness: There is no abdominal tenderness. There is no guarding or rebound. Musculoskeletal:         General: Normal range of motion. Cervical back: Normal range of motion. Right lower leg: No edema. Left lower leg: No edema. Lymphadenopathy:      Cervical: No cervical adenopathy. Skin:     General: Skin is warm. Capillary Refill: Capillary refill takes less than 2 seconds. Findings: No erythema. Neurological:      General: No focal deficit present. Mental Status: He is alert and oriented to person, place, and time. Cranial Nerves: No cranial nerve deficit. Motor: No weakness.    Psychiatric:         Mood and Affect: Mood normal.         Behavior: Behavior normal.       Cherelle Mon,

## 2023-09-19 PROBLEM — I10 PRIMARY HYPERTENSION: Status: ACTIVE | Noted: 2023-09-19

## 2023-09-19 PROBLEM — L40.9 SCALP PSORIASIS: Status: ACTIVE | Noted: 2023-09-19

## 2023-09-19 NOTE — ASSESSMENT & PLAN NOTE
- Stable. Has been using clobetasol solution on his scalp when needed. Over-the-counter cortisone cream seems to help any other spots.

## 2023-09-19 NOTE — ASSESSMENT & PLAN NOTE
Blood Pressure: 112/76    Stable. Continue lisinopril 10 mg p.o. daily  Continue low-salt/low-carb diet as discussed. Continue exercise recommendations as discussed  Follow-up in 6 months or sooner as needed.

## 2023-09-19 NOTE — ASSESSMENT & PLAN NOTE
Stable on the Amitiza. Having bowel movement approximately every other day.   Continue following with GI as scheduled

## 2023-09-19 NOTE — ASSESSMENT & PLAN NOTE
- Discussed healthier eating habits, avoid saturated fats/trans fats, processed foods, lower carbs and sugar.   Discussed exercise recommendations.  -We will repeat lipid panel prior to follow-up

## 2023-10-16 DIAGNOSIS — E78.00 PURE HYPERCHOLESTEROLEMIA: ICD-10-CM

## 2023-10-16 DIAGNOSIS — I10 ESSENTIAL HYPERTENSION: ICD-10-CM

## 2023-10-16 RX ORDER — ROSUVASTATIN CALCIUM 10 MG/1
10 TABLET, COATED ORAL DAILY
Qty: 90 TABLET | Refills: 2 | Status: SHIPPED | OUTPATIENT
Start: 2023-10-16

## 2023-10-16 RX ORDER — LISINOPRIL 10 MG/1
10 TABLET ORAL DAILY
Qty: 90 TABLET | Refills: 2 | Status: SHIPPED | OUTPATIENT
Start: 2023-10-16

## 2024-02-18 DIAGNOSIS — L40.9 SCALP PSORIASIS: ICD-10-CM

## 2024-02-19 RX ORDER — CLOBETASOL PROPIONATE 0.46 MG/ML
SOLUTION TOPICAL
Qty: 50 ML | Refills: 2 | Status: SHIPPED | OUTPATIENT
Start: 2024-02-19

## 2024-02-21 PROBLEM — Z12.11 COLON CANCER SCREENING: Status: RESOLVED | Noted: 2018-09-12 | Resolved: 2024-02-21

## 2024-03-15 LAB
ALBUMIN SERPL-MCNC: 4.6 G/DL (ref 3.5–5.7)
ALP SERPL-CCNC: 40 U/L (ref 35–120)
ALT SERPL-CCNC: 18 U/L
ANION GAP SERPL CALCULATED.3IONS-SCNC: 7 MMOL/L (ref 3–11)
AST SERPL-CCNC: 18 U/L
BILIRUB SERPL-MCNC: 0.8 MG/DL (ref 0.2–1)
BUN SERPL-MCNC: 14 MG/DL (ref 7–28)
CALCIUM SERPL-MCNC: 9 MG/DL (ref 8.5–10.1)
CHLORIDE SERPL-SCNC: 103 MMOL/L (ref 100–109)
CHOLEST SERPL-MCNC: 154 MG/DL
CHOLEST/HDLC SERPL: 1.9 {RATIO}
CO2 SERPL-SCNC: 27 MMOL/L (ref 21–31)
CREAT SERPL-MCNC: 0.94 MG/DL (ref 0.53–1.3)
CYTOLOGY CMNT CVX/VAG CYTO-IMP: NORMAL
GFR/BSA.PRED SERPLBLD CYS-BASED-ARV: 95 ML/MIN/{1.73_M2}
GLUCOSE SERPL-MCNC: 80 MG/DL (ref 65–99)
HDLC SERPL-MCNC: 83 MG/DL (ref 23–92)
LDLC SERPL CALC-MCNC: 63 MG/DL
NONHDLC SERPL-MCNC: 71 MG/DL
POTASSIUM SERPL-SCNC: 3.6 MMOL/L (ref 3.5–5.2)
PROT SERPL-MCNC: 6.7 G/DL (ref 6.3–8.3)
PSA SERPL-MCNC: 1.01 NG/ML
SODIUM SERPL-SCNC: 137 MMOL/L (ref 135–145)
TRIGL SERPL-MCNC: 40 MG/DL

## 2024-03-18 ENCOUNTER — OFFICE VISIT (OUTPATIENT)
Dept: FAMILY MEDICINE CLINIC | Facility: CLINIC | Age: 54
End: 2024-03-18
Payer: COMMERCIAL

## 2024-03-18 VITALS
DIASTOLIC BLOOD PRESSURE: 82 MMHG | SYSTOLIC BLOOD PRESSURE: 130 MMHG | WEIGHT: 192.8 LBS | RESPIRATION RATE: 16 BRPM | TEMPERATURE: 97.5 F | HEIGHT: 69 IN | HEART RATE: 62 BPM | OXYGEN SATURATION: 98 % | BODY MASS INDEX: 28.56 KG/M2

## 2024-03-18 DIAGNOSIS — Z13.1 SCREENING FOR DIABETES MELLITUS: ICD-10-CM

## 2024-03-18 DIAGNOSIS — E78.00 PURE HYPERCHOLESTEROLEMIA: ICD-10-CM

## 2024-03-18 DIAGNOSIS — E78.5 DYSLIPIDEMIA: ICD-10-CM

## 2024-03-18 DIAGNOSIS — Z00.00 ANNUAL PHYSICAL EXAM: Primary | ICD-10-CM

## 2024-03-18 DIAGNOSIS — I10 PRIMARY HYPERTENSION: ICD-10-CM

## 2024-03-18 DIAGNOSIS — I10 ESSENTIAL HYPERTENSION: ICD-10-CM

## 2024-03-18 PROCEDURE — 99396 PREV VISIT EST AGE 40-64: CPT | Performed by: FAMILY MEDICINE

## 2024-03-18 RX ORDER — ROSUVASTATIN CALCIUM 10 MG/1
10 TABLET, COATED ORAL DAILY
Qty: 90 TABLET | Refills: 2 | Status: SHIPPED | OUTPATIENT
Start: 2024-03-18

## 2024-03-18 RX ORDER — ROSUVASTATIN CALCIUM 10 MG/1
10 TABLET, COATED ORAL DAILY
Qty: 90 TABLET | Refills: 2 | Status: SHIPPED | OUTPATIENT
Start: 2024-03-18 | End: 2024-03-18

## 2024-03-18 RX ORDER — LISINOPRIL 10 MG/1
10 TABLET ORAL DAILY
Qty: 90 TABLET | Refills: 2 | Status: SHIPPED | OUTPATIENT
Start: 2024-03-18 | End: 2024-03-18

## 2024-03-18 RX ORDER — LISINOPRIL 10 MG/1
10 TABLET ORAL DAILY
Qty: 90 TABLET | Refills: 2 | Status: SHIPPED | OUTPATIENT
Start: 2024-03-18

## 2024-03-21 NOTE — ASSESSMENT & PLAN NOTE
Stable.   -Continue current regimen lisinopril 10 mg p.o. daily  -Continue low-salt, low-carb, low sugar, high-fiber, whole food eating.  -Discussed recommended activity level with a goal of 30 minutes moderate intensity exercise 5 days/week.  -Follow-up in 6 months.

## 2024-03-21 NOTE — ASSESSMENT & PLAN NOTE
- Well-controlled.  Continue current management with rosuvastatin 10 mg p.o. daily  -Continue dietary and lifestyle modifications as discussed.

## 2024-03-21 NOTE — PROGRESS NOTES
ADULT ANNUAL PHYSICAL  [facility] PRACTICE    NAME: Angelo Perry  AGE: 54 y.o. SEX: male  : 1970     DATE: 3/21/2024     Assessment and Plan:     Problem List Items Addressed This Visit       Dyslipidemia     - Well-controlled.  Continue current management with rosuvastatin 10 mg p.o. daily  -Continue dietary and lifestyle modifications as discussed.         Relevant Orders    Lipid Panel With Direct LDL    Primary hypertension     Stable.   -Continue current regimen lisinopril 10 mg p.o. daily  -Continue low-salt, low-carb, low sugar, high-fiber, whole food eating.  -Discussed recommended activity level with a goal of 30 minutes moderate intensity exercise 5 days/week.  -Follow-up in 6 months.           Relevant Medications    lisinopril (ZESTRIL) 10 mg tablet     Other Visit Diagnoses       Annual physical exam    -  Primary    Pure hypercholesterolemia        Relevant Medications    rosuvastatin (CRESTOR) 10 MG tablet    Essential hypertension        Relevant Medications    lisinopril (ZESTRIL) 10 mg tablet    Other Relevant Orders    Comprehensive metabolic panel    Screening for diabetes mellitus        Relevant Orders    Comprehensive metabolic panel            Immunizations and preventive care screenings were discussed with patient today. Appropriate education was printed on patient's after visit summary.    Discussed risks and benefits of prostate cancer screening. We discussed the controversial history of PSA screening for prostate cancer in the [place] as well as the risk of over detection and over treatment of prostate cancer by way of PSA screening.  The patient understands that PSA blood testing is an imperfect way to screen for prostate cancer and that elevated PSA levels in the blood may also be caused by infection, inflammation, prostatic trauma or manipulation, urological procedures, or by benign prostatic enlargement.    The role of the  digital rectal examination in prostate cancer screening was also discussed and I discussed with him that there is large interobserver variability in the findings of digital rectal examination.    Counseling:  Alcohol/drug use: discussed moderation in alcohol intake, the recommendations for healthy alcohol use, and avoidance of illicit drug use.  Dental Health: discussed importance of regular tooth brushing, flossing, and dental visits.  Injury prevention: discussed safety/seat belts, safety helmets, smoke detectors, carbon dioxide detectors, and smoking near bedding or upholstery.  Sexual health: discussed sexually transmitted diseases, partner selection, use of condoms, avoidance of unintended pregnancy, and contraceptive alternatives.  Exercise: the importance of regular exercise/physical activity was discussed. Recommend exercise 3-5 times per week for at least 30 minutes.          No follow-ups on file.     Chief Complaint:     Chief Complaint   Patient presents with    Physical Exam     Patient being seen for a physical exam      History of Present Illness:     Adult Annual Physical   Patient here for a comprehensive physical exam. The patient reports no problems.    Diet and Physical Activity  Diet/Nutrition: well balanced diet and consuming 3-5 servings of fruits/vegetables daily.   Exercise: walking, moderate cardiovascular exercise, and 3-4 times a week on average.      Depression Screening  PHQ-2/9 Depression Screening    Little interest or pleasure in doing things: 0 - not at all  Feeling down, depressed, or hopeless: 0 - not at all  PHQ-2 Score: 0  PHQ-2 Interpretation: Negative depression screen       General Health  Sleep: sleeps well and gets 7-8 hours of sleep on average.   Hearing: normal - bilateral.  Vision: goes for regular eye exams and wears glasses.   Dental: regular dental visits and brushes teeth twice daily.        Health  Symptoms include: none    Advanced Care Planning  Do you have an  advanced directive? no  Do you have a durable medical power of ? no  ACP document given to patient? no     Review of Systems:     Review of Systems   Constitutional:  Negative for chills and fever.   HENT:  Negative for ear pain and sore throat.    Eyes:  Negative for pain and visual disturbance.   Respiratory:  Negative for cough and shortness of breath.    Cardiovascular:  Negative for chest pain and palpitations.   Gastrointestinal:  Negative for abdominal pain and vomiting.   Genitourinary:  Negative for dysuria and hematuria.   Musculoskeletal:  Negative for arthralgias and back pain.   Skin:  Negative for color change and rash.   Neurological:  Negative for seizures and syncope.   Psychiatric/Behavioral:  Negative for confusion and sleep disturbance. The patient is not nervous/anxious.    All other systems reviewed and are negative.     Past Medical History:     Past Medical History:   Diagnosis Date    Allergic     Arthritis     Constipation       Past Surgical History:     Past Surgical History:   Procedure Laterality Date    COLONOSCOPY      [place] ESOPHAGOGASTRODUODENOSCOPY TRANSORAL DIAGNOSTIC N/A 10/18/2018    Procedure: EGD AND COLONOSCOPY;  Surgeon: Saba Amaya DO;  Location: [facility] GI LAB;  Service: Gastroenterology    UPPER GASTROINTESTINAL ENDOSCOPY        Family History:     Family History   Problem Relation Age of Onset    Pancreatic cancer Mother     Hypertension Mother     Diabetes Mother     COPD Mother     Arthritis Mother     Cancer Mother       Social History:     Social History     Socioeconomic History    Marital status: /Civil Union     Spouse name: None    Number of children: None    Years of education: None    Highest education level: None   Occupational History    None   Tobacco Use    Smoking status: Some Days     Current packs/day: 0.25     Average packs/day: 0.3 packs/day for 5.0 years (1.3 ttl pk-yrs)     Types: Cigars, Cigarettes    Smokeless tobacco: Never     "Tobacco comments:     cigars daily   Vaping Use    Vaping status: Never Used   Substance and Sexual Activity    Alcohol use: Yes     Alcohol/week: 12.0 standard drinks of alcohol     Types: 12 Cans of beer per week     Comment: socially    Drug use: Never    Sexual activity: Yes     Partners: Female     Birth control/protection: Male Sterilization, None   Other Topics Concern    None   Social History Narrative    None     Social Determinants of Health     Financial Resource Strain: Not on file   Food Insecurity: Not on file   Transportation Needs: Not on file   Physical Activity: Not on file   Stress: Not on file   Social Connections: Not on file   Intimate Partner Violence: Not on file   Housing Stability: Not on file      Current Medications:     Current Outpatient Medications   Medication Sig Dispense Refill    Amitiza 24 MCG capsule TAKE 1 CAPSULE BY MOUTH 2 TIMES A DAY WITH MEALS. 60 capsule 11    Cetirizine HCl 10 MG CAPS Take by mouth as needed       clobetasol (TEMOVATE) 0.05 % external solution APPLY TOPICALLY TO AFFECTED AREA EVERY DAY 50 mL 2    fluticasone (FLONASE) 50 mcg/act nasal spray as needed       lisinopril (ZESTRIL) 10 mg tablet Take 1 tablet (10 mg total) by mouth daily 90 tablet 2    rosuvastatin (CRESTOR) 10 MG tablet Take 1 tablet (10 mg total) by mouth daily 90 tablet 2    tadalafil (CIALIS) 5 MG tablet Take 1 tablet (5 mg total) by mouth daily as needed for erectile dysfunction 10 tablet 5     No current facility-administered medications for this visit.      Allergies:     No Known Allergies   Physical Exam:     /82 (BP Location: Left arm, Patient Position: Sitting, Cuff Size: Standard)   Pulse 62   Temp 97.5 °F (36.4 °C) (Tympanic)   Resp 16   Ht 5' 8.7\" (1.745 m)   Wt 87.5 kg (192 lb 12.8 oz)   SpO2 98%   BMI 28.72 kg/m²     Physical Exam  Vitals and nursing note reviewed.   Constitutional:       General: He is not in acute distress.     Appearance: Normal appearance. "   HENT:      Head: Normocephalic and atraumatic.      Right Ear: Tympanic membrane and external ear normal.      Left Ear: Tympanic membrane and external ear normal.      Nose: Nose normal.      Mouth/Throat:      Mouth: Mucous membranes are moist.   Eyes:      Extraocular Movements: Extraocular movements intact.      Conjunctiva/sclera: Conjunctivae normal.      Pupils: Pupils are equal, round, and reactive to light.   Cardiovascular:      Rate and Rhythm: Normal rate and regular rhythm.      Pulses: Normal pulses.      Heart sounds: Normal heart sounds. No murmur heard.  Pulmonary:      Effort: Pulmonary effort is normal.      Breath sounds: Normal breath sounds. No wheezing, rhonchi or rales.   Abdominal:      General: Bowel sounds are normal.      Palpations: Abdomen is soft.      Tenderness: There is no abdominal tenderness. There is no guarding.   Musculoskeletal:         General: Normal range of motion.      Cervical back: Normal range of motion.      Right lower leg: No edema.      Left lower leg: No edema.   Lymphadenopathy:      Cervical: No cervical adenopathy.   Skin:     General: Skin is warm.      Capillary Refill: Capillary refill takes less than 2 seconds.   Neurological:      General: No focal deficit present.      Mental Status: He is alert and oriented to person, place, and time.   Psychiatric:         Mood and Affect: Mood normal.         Behavior: Behavior normal.          Delvin Don, DO [facility]

## 2024-05-03 DIAGNOSIS — K58.1 IRRITABLE BOWEL SYNDROME WITH CONSTIPATION: ICD-10-CM

## 2024-05-03 RX ORDER — LUBIPROSTONE 24 UG/1
CAPSULE ORAL
Qty: 60 CAPSULE | Refills: 11 | Status: SHIPPED | OUTPATIENT
Start: 2024-05-03 | End: 2024-05-08 | Stop reason: SDUPTHER

## 2024-05-07 NOTE — PROGRESS NOTES
[facility] - Outpatient Follow-up Note  Angelo Perry 54 y.o. male MRN: [id number]  Encounter: [id number]          ASSESSMENT AND PLAN:       Angelo is a 53 y/o male with IBS-C, GERD, HTN, HLD who presents for follow-up.     IBS-C  On amitiza 24 mcg once/day. He says he moves his bowels every 5-6 days, which is his norm. He says he would be interested in moving his bowels more often and says miralax as needed does work.   -explained I would like for him to try to move his bowels every 2-3 days instead of every 5-6 days  -continue amitiza 24 mcg daily with dinner  -increase daily water intake to at least 64 ounces of water/day  -start miralax every 2-3 days as needed for constipation: explained he can even take this daily if he would like    2. GERD  Last EGD in 2018 essentially WNL with gastric bx negative for H.pylori but did note chronic gastritis and duodenal bx negative for celiac. He has not had any issues with reflux.     3. Colon cancer screening  Last colonoscopy in 2018 WNL  -repeat colonoscopy due 2028   ______________________________________________________________________    SUBJECTIVE:  Angelo is a 53 y/o male with IBS-C, GERD, HTN, HLD who presents for follow-up. He says he moves his bowels every 5-6 days, which is his norm. He says he would be interested in moving his bowels more often and says miralax as needed does work. He denies n/v, heartburn, abdominal pain, trouble swallowing, diarrhea, NSAID use, family hx of colon cancer, unintentional weight loss, fevers, chills, night sweats, bloody or black BM.       REVIEW OF SYSTEMS IS OTHERWISE NEGATIVE.      Historical Information   Past Medical History:   Diagnosis Date    Allergic     Arthritis     Constipation      Past Surgical History:   Procedure Laterality Date    COLONOSCOPY      [place] ESOPHAGOGASTRODUODENOSCOPY TRANSORAL DIAGNOSTIC N/A 10/18/2018    Procedure: EGD AND COLONOSCOPY;  Surgeon: aSba Amaya DO;   Location: [facility] GI LAB;  Service: Gastroenterology    UPPER GASTROINTESTINAL ENDOSCOPY       Social History   Social History     Substance and Sexual Activity   Alcohol Use Yes    Alcohol/week: 12.0 standard drinks of alcohol    Types: 12 Cans of beer per week    Comment: socially     Social History     Substance and Sexual Activity   Drug Use Never     Social History     Tobacco Use   Smoking Status Some Days    Current packs/day: 0.25    Average packs/day: 0.3 packs/day for 5.0 years (1.3 ttl pk-yrs)    Types: Cigars, Cigarettes   Smokeless Tobacco Never   Tobacco Comments    cigars daily     Family History   Problem Relation Age of Onset    Pancreatic cancer Mother     Hypertension Mother     Diabetes Mother     COPD Mother     Arthritis Mother     Cancer Mother        Meds/Allergies       Current Outpatient Medications:     Cetirizine HCl 10 MG CAPS    clobetasol (TEMOVATE) 0.05 % external solution    fluticasone (FLONASE) 50 mcg/act nasal spray    lisinopril (ZESTRIL) 10 mg tablet    lubiprostone (AMITIZA) 24 mcg capsule    rosuvastatin (CRESTOR) 10 MG tablet    tadalafil (CIALIS) 5 MG tablet    No Known Allergies        Objective     There were no vitals taken for this visit. There is no height or weight on file to calculate BMI.      PHYSICAL EXAM:      General Appearance:   Alert, cooperative, no distress   HEENT:   Normocephalic, atraumatic, anicteric.     Neck:  Supple, symmetrical, trachea midline   Lungs:   Clear to auscultation bilaterally; no rales, rhonchi or wheezing; respirations unlabored    Heart::   Regular rate and rhythm; no murmur, rub, or gallop.   Abdomen:   Soft, non-tender, non-distended; normal bowel sounds; no masses, no organomegaly    Genitalia:   Deferred    Rectal:   Deferred    Extremities:  No cyanosis, clubbing or edema    Pulses:  2+ and symmetric    Skin:  No jaundice, rashes, or lesions    Lymph nodes:  No palpable cervical lymphadenopathy        Lab Results:   No visits with  results within 1 Day(s) from this visit.   Latest known visit with results is:   Orders Only on 03/15/2024   Component Date Value    Glucose, Random 03/15/2024 80     BUN 03/15/2024 14     Creatinine 03/15/2024 0.94     Sodium 03/15/2024 137     Potassium 03/15/2024 3.6     Chloride 03/15/2024 103     CO2 03/15/2024 27     Calcium 03/15/2024 9.0     Alkaline Phosphatase 03/15/2024 40     Albumin 03/15/2024 4.6     TOTAL BILIRUBIN 03/15/2024 0.8     Protein, Total 03/15/2024 6.7     AST 03/15/2024 18     ALT 03/15/2024 18     ANION GAP 03/15/2024 7     eGFR 03/15/2024 95     Comment 03/15/2024 (Note)     Cholesterol, Total 03/15/2024 154     Triglycerides 03/15/2024 40     HDL Cholesterol 03/15/2024 83     Non HDL Chol. (LDL+VLDL) 03/15/2024 71     Chol HDLC Ratio 03/15/2024 1.9     LDL Calculated 03/15/2024 63     Prostate Specific Antige* 03/15/2024 1.01          Radiology Results:   No results found.

## 2024-05-08 ENCOUNTER — OFFICE VISIT (OUTPATIENT)
Dept: GASTROENTEROLOGY | Facility: CLINIC | Age: 54
End: 2024-05-08
Payer: COMMERCIAL

## 2024-05-08 VITALS
DIASTOLIC BLOOD PRESSURE: 60 MMHG | WEIGHT: 196 LBS | HEIGHT: 68 IN | SYSTOLIC BLOOD PRESSURE: 120 MMHG | TEMPERATURE: 99.1 F | BODY MASS INDEX: 29.7 KG/M2

## 2024-05-08 DIAGNOSIS — K58.1 IRRITABLE BOWEL SYNDROME WITH CONSTIPATION: ICD-10-CM

## 2024-05-08 PROCEDURE — 99214 OFFICE O/P EST MOD 30 MIN: CPT | Performed by: PHYSICIAN ASSISTANT

## 2024-05-08 RX ORDER — LUBIPROSTONE 24 UG/1
24 CAPSULE ORAL DAILY
Qty: 30 CAPSULE | Refills: 11 | Status: SHIPPED | OUTPATIENT
Start: 2024-05-08

## 2024-05-08 NOTE — PATIENT INSTRUCTIONS
Please try increasing daily water intake to at least 64 ounces of water/day  Continue amitiza 24 mcg daily once/day with meals  Start taking miralax every 2-3 days. This is even safe enough to use daily.

## 2024-11-09 ENCOUNTER — OFFICE VISIT (OUTPATIENT)
Dept: URGENT CARE | Age: 54
End: 2024-11-09
Payer: COMMERCIAL

## 2024-11-09 VITALS
HEIGHT: 70 IN | TEMPERATURE: 97.5 F | SYSTOLIC BLOOD PRESSURE: 164 MMHG | RESPIRATION RATE: 18 BRPM | WEIGHT: 195 LBS | HEART RATE: 61 BPM | OXYGEN SATURATION: 98 % | BODY MASS INDEX: 27.92 KG/M2 | DIASTOLIC BLOOD PRESSURE: 80 MMHG

## 2024-11-09 DIAGNOSIS — M25.512 ACUTE PAIN OF LEFT SHOULDER: ICD-10-CM

## 2024-11-09 DIAGNOSIS — S46.211A STRAIN OF RIGHT BICEPS, INITIAL ENCOUNTER: ICD-10-CM
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
## 2024-11-09 DIAGNOSIS — M25.511 ACUTE PAIN OF RIGHT SHOULDER: Primary | ICD-10-CM

## 2024-11-09 DIAGNOSIS — S46.212A STRAIN OF LEFT BICEPS, INITIAL ENCOUNTER: ICD-10-CM

## 2024-11-09 PROCEDURE — 99213 OFFICE O/P EST LOW 20 MIN: CPT | Performed by: PHYSICIAN ASSISTANT

## 2024-11-09 RX ORDER — IBUPROFEN 600 MG/1
600 TABLET, FILM COATED ORAL EVERY 6 HOURS PRN
Qty: 30 TABLET | Refills: 0 | Status: SHIPPED | OUTPATIENT
Start: 2024-11-09

## 2024-11-09 NOTE — LETTER
November 9, 2024     Patient: Angelo Perry   YOB: 1970   Date of Visit: 11/9/2024       To Whom It May Concern:    It is my medical opinion that Angelo Perry may return to light duty immediately with the following restrictions: no lifting with either arm until 11/14/24 .    If you have any questions or concerns, please don't hesitate to call.         Sincerely,        Swathi Duval PA-C    CC: No Recipients

## 2024-11-09 NOTE — PATIENT INSTRUCTIONS
Please ice for 20 minutes at least four times daily and keep injury elevated as much as possible until symptoms improve. Please perform gentle range of motion four times daily  Ibuprofen for pain  Avoid heavy lifting  Please follow up with orthopedics if pain not improving in 3-5 days    Please recheck blood pressure and if it remains over 140/90 please follow up with family doctor.

## 2024-11-09 NOTE — PROGRESS NOTES
[facility]        NAME: Angelo Perry is a 54 y.o. male  : 1970    MRN: [id number]  DATE: 2024  TIME: 1:38 PM      Assessment and Plan     Acute pain of right shoulder [M25.511]  1. Acute pain of right shoulder  ibuprofen (MOTRIN) 600 mg tablet    Ambulatory Referral to Orthopedic Surgery      2. Acute pain of left shoulder  ibuprofen (MOTRIN) 600 mg tablet    Ambulatory Referral to Orthopedic Surgery      3. Strain of left biceps, initial encounter  ibuprofen (MOTRIN) 600 mg tablet      4. Strain of right biceps, initial encounter  ibuprofen (MOTRIN) 600 mg tablet      5. Fall from one level to another, initial encounter  ibuprofen (MOTRIN) 600 mg tablet        Pt is very tender on physical exam with lifting arms to shoulder level, concern for rotator cuff injury. I discussed with pt to trial conservative measures and if no improvement in 3-5 days follow up with orthopedics.     POC Testing Results        Note:       Patient Instructions     Patient Instructions    Please ice for 20 minutes at least four times daily and keep injury elevated as much as possible until symptoms improve. Please perform gentle range of motion four times daily  Ibuprofen for pain  Avoid heavy lifting  Please follow up with orthopedics if pain not improving in 3-5 days    Please recheck blood pressure and if it remains over 140/90 please follow up with family doctor.     Please recheck blood pressure and if it remains over 140/90 please follow up with family doctor.    Follow up with primary care provider.   Go to ER if symptoms worsen.    Chief Complaint     Chief Complaint   Patient presents with    Shoulder Pain     Per patient, he slipped coming down a ladder and tried to prevent his fall and hurt his bilateral shoulders. He did not hit his head or LOC. He is having difficulty raising his arms above his shoulder height.          History of Present Illness     Pt reports he was putting away furniture in an  beata loft about 7 feet off the floor when he lost his footing on the ladder and used his arms to hold his weight up before landing on his feet on the floor beneath him. Pt denies hitting his head or LOC. He reports previous injury to right shoulder, states he received steroid injections for tendonitis of bicep. Pt denies numbness/tingling. Pt reports pain lifting shoulders to bilateral bicep areas.        Review of Systems     Review of Systems   Musculoskeletal:  Positive for arthralgias and myalgias. Negative for back pain, gait problem and joint swelling.   Skin:  Negative for color change, pallor, rash and wound.   Neurological:  Negative for syncope and numbness.         Current Medications       Current Outpatient Medications:     Cetirizine HCl 10 MG CAPS, Take by mouth as needed , Disp: , Rfl:     clobetasol (TEMOVATE) 0.05 % external solution, APPLY TOPICALLY TO AFFECTED AREA EVERY DAY, Disp: 50 mL, Rfl: 2    ibuprofen (MOTRIN) 600 mg tablet, Take 1 tablet (600 mg total) by mouth every 6 (six) hours as needed for moderate pain, Disp: 30 tablet, Rfl: 0    lisinopril (ZESTRIL) 10 mg tablet, Take 1 tablet (10 mg total) by mouth daily, Disp: 90 tablet, Rfl: 2    rosuvastatin (CRESTOR) 10 MG tablet, Take 1 tablet (10 mg total) by mouth daily, Disp: 90 tablet, Rfl: 2    tadalafil (CIALIS) 5 MG tablet, Take 1 tablet (5 mg total) by mouth daily as needed for erectile dysfunction, Disp: 10 tablet, Rfl: 5    fluticasone (FLONASE) 50 mcg/act nasal spray, as needed  (Patient not taking: Reported on 11/9/2024), Disp: , Rfl:     lubiprostone (AMITIZA) 24 mcg capsule, Take 1 capsule (24 mcg total) by mouth daily With meals (Patient not taking: Reported on 11/9/2024), Disp: 30 capsule, Rfl: 11    Current Allergies     Allergies as of 11/09/2024    (No Known Allergies)              The following portions of the patient's history were reviewed and updated as appropriate: allergies, current medications, past family history,  "past medical history, past social history, past surgical history, and problem list.     Past Medical History:   Diagnosis Date    Allergic     Arthritis     Constipation     Hernia CT July    Irritable bowel syndrome     On file       Past Surgical History:   Procedure Laterality Date    COLONOSCOPY      [place] ESOPHAGOGASTRODUODENOSCOPY TRANSORAL DIAGNOSTIC N/A 10/18/2018    Procedure: EGD AND COLONOSCOPY;  Surgeon: Saba Amaya DO;  Location: [facility] GI LAB;  Service: Gastroenterology    UPPER GASTROINTESTINAL ENDOSCOPY         Family History   Problem Relation Age of Onset    Pancreatic cancer Mother     Hypertension Mother     Diabetes Mother     COPD Mother     Arthritis Mother     Cancer Mother     Hepatitis Mother          Medications have been verified.        Objective     /80   Pulse 61   Temp 97.5 °F (36.4 °C) (Tympanic)   Resp 18   Ht 5' 10\" (1.778 m)   Wt 88.5 kg (195 lb)   SpO2 98%   BMI 27.98 kg/m²   No LMP for male patient.         Physical Exam     Physical Exam  Vitals and nursing note reviewed.   Constitutional:       General: He is not in acute distress.     Appearance: Normal appearance. He is normal weight. He is not ill-appearing, toxic-appearing or diaphoretic.   HENT:      Head: Normocephalic and atraumatic.   Eyes:      Conjunctiva/sclera: Conjunctivae normal.   Cardiovascular:      Rate and Rhythm: Normal rate and regular rhythm.      Pulses: Normal pulses.      Heart sounds: Normal heart sounds. No murmur heard.     No friction rub. No gallop.   Pulmonary:      Effort: Pulmonary effort is normal. No respiratory distress.      Breath sounds: Normal breath sounds. No stridor. No wheezing, rhonchi or rales.   Musculoskeletal:      Right shoulder: Tenderness present. No swelling, deformity, effusion, laceration, bony tenderness or crepitus. Decreased range of motion. Decreased strength. Normal pulse.      Left shoulder: Tenderness present. No swelling, deformity, effusion, " laceration, bony tenderness or crepitus. Decreased range of motion. Decreased strength. Normal pulse.      Right upper arm: Tenderness present. No swelling, edema, deformity, lacerations or bony tenderness.      Left upper arm: Tenderness present. No swelling, edema, deformity, lacerations or bony tenderness.      Right elbow: Normal.      Left elbow: Normal.      Thoracic back: Normal.      Comments: Pt is tender over bilateral biceps with pain with yergason. No derformity noted or sulcus. Pt has pain with resistance against internal/external rotation, negative lift off, unable to perform empty can due to pain with ROM. Pt abduction, extension at shoulder is reduced to about 45 degrees   Skin:     General: Skin is warm and dry.      Coloration: Skin is not jaundiced or pale.      Findings: No bruising, erythema, lesion or rash.   Neurological:      General: No focal deficit present.      Mental Status: He is alert and oriented to person, place, and time. Mental status is at baseline.   Psychiatric:         Mood and Affect: Mood normal.         Behavior: Behavior normal.         Thought Content: Thought content normal.         Judgment: Judgment normal.

## 2024-11-11 ENCOUNTER — DOCUMENTATION (OUTPATIENT)
Dept: ADMINISTRATIVE | Facility: OTHER | Age: 54
End: 2024-11-11

## 2024-11-11 NOTE — PROGRESS NOTES
Swathi Duval PA-C  P Patient Reported Team         Blood pressure elevated  Appointment department: [facility]  Appointment provider: Swathi Duval PA-C  Blood pressure  11/09/24 1050 164/80  11/09/24 1018 163/77

## 2024-11-11 NOTE — PROGRESS NOTES
11/11/24 11:55 AM    Patient was called after the Urgent Care visit . Patient does not have a home BP machine to re-take BP. Patient has no symptoms.     Thank you.  Marely Gamez MA  [facility]

## 2024-11-13 ENCOUNTER — OFFICE VISIT (OUTPATIENT)
Dept: OBGYN CLINIC | Facility: CLINIC | Age: 54
End: 2024-11-13
Payer: COMMERCIAL

## 2024-11-13 ENCOUNTER — HOSPITAL ENCOUNTER (OUTPATIENT)
Dept: RADIOLOGY | Facility: HOSPITAL | Age: 54
Discharge: HOME | End: 2024-11-13
Attending: STUDENT IN AN ORGANIZED HEALTH CARE EDUCATION/TRAINING PROGRAM
Payer: COMMERCIAL

## 2024-11-13 VITALS — HEIGHT: 70 IN | BODY MASS INDEX: 28.49 KG/M2 | WEIGHT: 199 LBS | OXYGEN SATURATION: 99 % | HEART RATE: 56 BPM

## 2024-11-13 DIAGNOSIS — M75.22 BICEPS TENDONITIS ON LEFT: ICD-10-CM

## 2024-11-13 DIAGNOSIS — M25.511 ACUTE PAIN OF RIGHT SHOULDER: ICD-10-CM

## 2024-11-13 DIAGNOSIS — M25.512 LEFT SHOULDER PAIN, UNSPECIFIED CHRONICITY: ICD-10-CM

## 2024-11-13 DIAGNOSIS — M75.21 BICEPS TENDONITIS ON RIGHT: Primary | ICD-10-CM

## 2024-11-13 PROCEDURE — 99203 OFFICE O/P NEW LOW 30 MIN: CPT | Performed by: STUDENT IN AN ORGANIZED HEALTH CARE EDUCATION/TRAINING PROGRAM

## 2024-11-13 PROCEDURE — 73030 X-RAY EXAM OF SHOULDER: CPT

## 2024-11-13 RX ORDER — MELOXICAM 15 MG/1
15 TABLET ORAL DAILY
Qty: 30 TABLET | Refills: 2 | Status: SHIPPED | OUTPATIENT
Start: 2024-11-13 | End: 2025-02-11

## 2024-11-13 NOTE — ASSESSMENT & PLAN NOTE
Orders:    XR shoulder 2+ vw left; Future    Ambulatory Referral to Physical Therapy; Future    meloxicam (Mobic) 15 mg tablet; Take 1 tablet (15 mg total) by mouth daily

## 2024-11-13 NOTE — ASSESSMENT & PLAN NOTE
Orders:    XR shoulder 2+ vw right; Future    Ambulatory Referral to Orthopedic Surgery    Ambulatory Referral to Physical Therapy; Future    meloxicam (Mobic) 15 mg tablet; Take 1 tablet (15 mg total) by mouth daily

## 2024-11-13 NOTE — PROGRESS NOTES
Initial Orthopedic Sports Medicine Office Visit    Assessment:  Angelo Perry is a 54 y.o.  RHD male  with a history and physical examination consistent with bilateral shoulder long head of the biceps tendinitis.      Plan:   Discussed x-ray findings as well as physical exam findings with patient in the office today.  Discussed physical exam findings consistent with biceps tendinitis of the bilateral shoulders.  Discussed conservative treatment with physical therapy to work on shoulder stabilization as well as strengthening of the muscles around the shoulder.  Discussed continued activity modification as well as rest as needed for persistent pain and inflammation.  Offered meloxicam to be taken as needed for any persistent inflammation to the bilateral shoulders.  Discussed side effects and risks of medication.  Prescription and instructions for oral use provided to patient today.  Patient expressed understanding of the above treatments and chose to move forward with activity modification as well as physical therapy and meloxicam secondary to underlying biceps tendinitis of the bilateral shoulders.  Patient will follow-up in 6 weeks for reevaluation of current issue.  If persistent pain at this time can consider steroid injections of the bilateral biceps tendon sheath.    Imaging:    All imaging from today was reviewed by myself and explained to the patient.  X-ray of the bilateral shoulders reveals no acute fractures or dislocation.  No significant degenerative changes.      Injection:    No Injection planned at this time.  Discussed return in 6 weeks for reevaluation after physical therapy and meloxicam.  If persistent pain at this time can consider injection of the bilateral biceps tendon sheath      Surgery:     No surgery is recommended at this point, continue with conservative treatment plan as noted.      Follow up:    Return in about 6 weeks (around 12/25/2024). without x-rays    Assessment & Plan  Biceps  tendonitis on right    Orders:    XR shoulder 2+ vw right; Future    Ambulatory Referral to Orthopedic Surgery    Ambulatory Referral to Physical Therapy; Future    meloxicam (Mobic) 15 mg tablet; Take 1 tablet (15 mg total) by mouth daily    Biceps tendonitis on left    Orders:    XR shoulder 2+ vw left; Future    Ambulatory Referral to Physical Therapy; Future    meloxicam (Mobic) 15 mg tablet; Take 1 tablet (15 mg total) by mouth daily      CC: Bilateral shoulder pain    History of Present Illness:  Angelo Perry is a 54 y.o. male  who presents to the office for evaluation of his bilateral shoulders.  Patient states he had a full on 11/08/2024 in which he was placing items in an attic and fell off a ladder causing the shoulders to get pulled in a overhead motion.  Patient states since this time he has had persistent pain over the anterior aspect of the bilateral shoulders.  Patient works in a labor-intensive job and notes previous problems with the anterior aspect of the right shoulder.  Has had 2 previous injections of the right shoulder with relief of pain at that time.  Patient denies formal physical therapy.  Denies previous surgery to the bilateral shoulders.  Denies numbness and tingling.  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    PMHx:   Past Medical History:   Diagnosis Date    Allergic     Arthritis     Constipation     Hernia CT July    Irritable bowel syndrome     On file     PSHx:   Past Surgical History:   Procedure Laterality Date    COLONOSCOPY      [place] ESOPHAGOGASTRODUODENOSCOPY TRANSORAL DIAGNOSTIC N/A 10/18/2018    Procedure: EGD AND COLONOSCOPY;  Surgeon: Saba Amaya DO;  Location: [facility] GI LAB;  Service: Gastroenterology    UPPER GASTROINTESTINAL ENDOSCOPY       Medications:   Current Outpatient Medications on File Prior to Visit   Medication Sig Dispense Refill    Cetirizine HCl 10 MG CAPS Take by mouth as needed       clobetasol (TEMOVATE) 0.05 %  external solution APPLY TOPICALLY TO AFFECTED AREA EVERY DAY 50 mL 2    ibuprofen (MOTRIN) 600 mg tablet Take 1 tablet (600 mg total) by mouth every 6 (six) hours as needed for moderate pain 30 tablet 0    lisinopril (ZESTRIL) 10 mg tablet Take 1 tablet (10 mg total) by mouth daily 90 tablet 2    rosuvastatin (CRESTOR) 10 MG tablet Take 1 tablet (10 mg total) by mouth daily 90 tablet 2    tadalafil (CIALIS) 5 MG tablet Take 1 tablet (5 mg total) by mouth daily as needed for erectile dysfunction 10 tablet 5    fluticasone (FLONASE) 50 mcg/act nasal spray as needed  (Patient not taking: Reported on 11/9/2024)      lubiprostone (AMITIZA) 24 mcg capsule Take 1 capsule (24 mcg total) by mouth daily With meals (Patient not taking: Reported on 11/13/2024) 30 capsule 11     No current facility-administered medications on file prior to visit.     Allergies: No Known Allergies   Social History: Patient works in [employer].  Family History:   Family History   Problem Relation Age of Onset    Pancreatic cancer Mother     Hypertension Mother     Diabetes Mother     COPD Mother     Arthritis Mother     Cancer Mother     Hepatitis Mother       Review of systems: ROS is negative other than that noted in the HPI.  Constitutional: Negative for fatigue and fever.   HENT: Negative for sore throat.    Respiratory: Negative for shortness of breath.    Cardiovascular: Negative for chest pain.   Gastrointestinal: Negative for abdominal pain.   Endocrine: Negative for cold intolerance and heat intolerance.   Genitourinary: Negative for flank pain.   Musculoskeletal: Negative for back pain.   Skin: Negative for rash.   Allergic/Immunologic: Negative for immunocompromised state.   Neurological: Negative for dizziness.   Psychiatric/Behavioral: Negative for agitation.       Comprehensive Physical Examination:  Vitals:    11/13/24 1037   Pulse: 56   SpO2: 99%        General Appearance: The patient is a well developed, well nourished male  in no  apparent distress.  Orientation:  The patient is alert and interactive.  Oriented to time, place and person.  Mood and Affect:  The patient has normal mood and affect.    Shoulder focused exam:       RIGHT LEFT    Scapula Atrophy Negative Negative     Winging Negative Negative     Protraction Negative Negative    Rotator cuff SS 5/5 5/5     IS 5/5 5/5     SubS 5/5 5/5    ROM     170     ER0 60 60     ER90 90    90     IR90 T6    T6     IRb T6    T6    TTP: AC Negative Negative     Biceps Positive Positive     SC Joint Negative Negative     Scapular Spine Negative Negative     Proximal Humerus Negative Negative    Special Tests: O'Briens Positive Positive     Cross body Adduction Negative Negative     Speeds  Positive Positive     Theo's Negative Negative     Neer Negative Negative     Blue Negative Negative     Bear Hug Negative Negative    Instability: Apprehension & relocation not tested not tested     Load & shift not tested not tested                 UE NV Exam: +2 Radial pulses bilaterally  Sensation intact to light touch C5-T1 bilaterally, Radial/median/ulnar nerve motor intact    Bilateral elbow, wrist, and and forearm ROM full, painless with passive ROM, no ttp or crepitance throughout extremities below shoulder joint    Cervical ROM is full without pain, numbness or tingling. Negative Spurling.    Radiographic Imaging: I personally reviewed and interpreted 4 radiographs of his bilateral shoulder including AP internal rotation, Grashey, axillary lateral, and scapular Y views which were taken in the office and reviewed with the patient in detail.  The shoulder is reduced.  There is no evidence of glenohumeral arthritis.  There is Mild DJD of his AC joint.  No significant acromiohumeral interval narrowing. No bony pathology is noted to be present.     Imaging discussed and reviewed at length with patient in the office today.

## 2024-11-20 DIAGNOSIS — I10 ESSENTIAL HYPERTENSION: ICD-10-CM

## 2024-11-20 DIAGNOSIS — E78.00 PURE HYPERCHOLESTEROLEMIA: ICD-10-CM

## 2024-11-20 RX ORDER — LISINOPRIL 10 MG/1
10 TABLET ORAL DAILY
Qty: 90 TABLET | Refills: 1 | Status: SHIPPED | OUTPATIENT
Start: 2024-11-20

## 2024-11-20 RX ORDER — ROSUVASTATIN CALCIUM 10 MG/1
10 TABLET, COATED ORAL DAILY
Qty: 90 TABLET | Refills: 1 | Status: SHIPPED | OUTPATIENT
Start: 2024-11-20

## 2024-11-21 ENCOUNTER — TELEPHONE (OUTPATIENT)
Dept: PHYSICAL THERAPY | Facility: REHABILITATION | Age: 54
End: 2024-11-21

## 2024-11-21 NOTE — TELEPHONE ENCOUNTER
Attempted to contact the patient regarding their insurance verification. Left the patient a voicemail.

## 2024-12-27 ENCOUNTER — OFFICE VISIT (OUTPATIENT)
Dept: OBGYN CLINIC | Facility: CLINIC | Age: 54
End: 2024-12-27
Payer: COMMERCIAL

## 2024-12-27 VITALS — WEIGHT: 198 LBS | HEIGHT: 70 IN | BODY MASS INDEX: 28.35 KG/M2

## 2024-12-27 DIAGNOSIS — M75.81 ROTATOR CUFF TENDINITIS, RIGHT: ICD-10-CM

## 2024-12-27 DIAGNOSIS — M75.41 IMPINGEMENT SYNDROME OF RIGHT SHOULDER: ICD-10-CM

## 2024-12-27 DIAGNOSIS — M75.22 BICEPS TENDONITIS ON LEFT: ICD-10-CM

## 2024-12-27 DIAGNOSIS — M75.21 BICEPS TENDINITIS OF RIGHT SHOULDER: Primary | ICD-10-CM

## 2024-12-27 DIAGNOSIS — M75.42 IMPINGEMENT SYNDROME OF LEFT SHOULDER: ICD-10-CM

## 2024-12-27 DIAGNOSIS — M75.21 BICEPS TENDONITIS ON RIGHT: ICD-10-CM

## 2024-12-27 DIAGNOSIS — M75.22 BICEPS TENDINITIS OF LEFT SHOULDER: ICD-10-CM

## 2024-12-27 DIAGNOSIS — M75.82 ROTATOR CUFF TENDINITIS, LEFT: ICD-10-CM

## 2024-12-27 PROCEDURE — 20610 DRAIN/INJ JOINT/BURSA W/O US: CPT | Performed by: STUDENT IN AN ORGANIZED HEALTH CARE EDUCATION/TRAINING PROGRAM

## 2024-12-27 PROCEDURE — 99214 OFFICE O/P EST MOD 30 MIN: CPT | Performed by: STUDENT IN AN ORGANIZED HEALTH CARE EDUCATION/TRAINING PROGRAM

## 2024-12-27 PROCEDURE — 20611 DRAIN/INJ JOINT/BURSA W/US: CPT | Performed by: STUDENT IN AN ORGANIZED HEALTH CARE EDUCATION/TRAINING PROGRAM

## 2024-12-27 RX ORDER — MELOXICAM 15 MG/1
15 TABLET ORAL DAILY
Qty: 30 TABLET | Refills: 2 | Status: SHIPPED | OUTPATIENT
Start: 2024-12-27 | End: 2025-03-27

## 2024-12-27 RX ADMIN — LIDOCAINE HYDROCHLORIDE 1 ML: 10 INJECTION, SOLUTION INFILTRATION; PERINEURAL at 13:45

## 2024-12-27 RX ADMIN — METHYLPREDNISOLONE ACETATE 1 ML: 40 INJECTION, SUSPENSION INTRA-ARTICULAR; INTRALESIONAL; INTRAMUSCULAR; SOFT TISSUE at 13:45

## 2024-12-27 RX ADMIN — BUPIVACAINE HYDROCHLORIDE 1 ML: 2.5 INJECTION, SOLUTION INFILTRATION; PERINEURAL at 13:45

## 2024-12-27 NOTE — PROGRESS NOTES
[facility] Sports Medicine Shoulder Follow Up Visit     Assesment:   54 y.o. male bilateral shoulder biceps long head tenosynovitis and subacromial impingement syndrome    Plan:    Angelo demonstrates persistent pain of both his left and right shoulders consistent with biceps long head tenosynovitis and subacromial impingement syndrome with rotator cuff tendinitis.  His range of motion and strength are overall intact.  Given the persistent nature of his pain as previously discussed I did offer him a steroid injection to the biceps long head tendon sheath for both the left and right sides as well as into the subacromial space bilaterally.  Risks and benefits of the injection were discussed with him today.  He will continue his home exercise regimen to maintain range of motion and increase strength.  I did provide him with a refill of his meloxicam today to be taken as needed.  I like to see him back in 6 weeks for repeat clinical evaluation.  If he fails have lasting relief from the injections and continued therapy we will consider ordering an MRI of the more symptomatic shoulder.    Imaging:    No imaging was available for review today.      Injection:    The risks and benefits of the injection (which include but are not limited to: infection, bleeding,damage to nerve/artery, need for further intervention), as well as the risks and benefits of all alternative treatments were explained and understood.  The patient elected to proceed with injection. The procedure was done with aseptic technique, and the patient tolerated the procedure well with no complications.  A corticosteroid injection of the subacromial space was performed.  Corticosteroid injection of the biceps long head tendon sheath was performed under ultrasound guidance.    Large joint arthrocentesis  Universal Protocol:  procedure performed by consultantConsent: Verbal consent obtained.  Risks and benefits: risks, benefits and alternatives were  discussed  Consent given by: patient  Timeout called at: 12/27/2024 2:18 PM.  Patient understanding: patient states understanding of the procedure being performed  Site marked: the operative site was marked  Radiology Images displayed and confirmed. If images not available, report reviewed: imaging studies available  Patient identity confirmed: verbally with patient  Supporting Documentation  Indications: pain   Procedure Details  Location: shoulder - Shoulder joint: Right shoulder biceps long head tendon sheath.  Preparation: Patient was prepped and draped in the usual sterile fashion  Needle size: 22 G  Ultrasound guidance: yes  Approach: anterior  Medications administered: 1 mL bupivacaine 0.25 %; 1 mL lidocaine 1 %; 1 mL methylPREDNISolone acetate 40 mg/mL    Patient tolerance: patient tolerated the procedure well with no immediate complications  Dressing:  Sterile dressing applied      Large joint arthrocentesis  Universal Protocol:  procedure performed by consultantConsent: Verbal consent obtained.  Risks and benefits: risks, benefits and alternatives were discussed  Consent given by: patient  Timeout called at: 12/27/2024 2:21 PM.  Patient understanding: patient states understanding of the procedure being performed  Site marked: the operative site was marked  Radiology Images displayed and confirmed. If images not available, report reviewed: imaging studies available  Patient identity confirmed: verbally with patient  Supporting Documentation  Indications: pain   Procedure Details  Location: shoulder - Shoulder joint: Left biceps long head tendon sheath.  Preparation: Patient was prepped and draped in the usual sterile fashion  Needle size: 22 G  Ultrasound guidance: yes  Approach: anterior  Medications administered: 1 mL bupivacaine 0.25 %; 1 mL lidocaine 1 %; 1 mL methylPREDNISolone acetate 40 mg/mL    Patient tolerance: patient tolerated the procedure well with no immediate complications  Dressing:   Sterile dressing applied      Large joint arthrocentesis: R subacromial bursa  [place] Protocol:  procedure performed by consultantConsent: Verbal consent obtained.  Risks and benefits: risks, benefits and alternatives were discussed  Consent given by: patient  Timeout called at: 12/27/2024 2:21 PM.  Patient understanding: patient states understanding of the procedure being performed  Site marked: the operative site was marked  Radiology Images displayed and confirmed. If images not available, report reviewed: imaging studies available  Patient identity confirmed: verbally with patient  Supporting Documentation  Indications: pain   Procedure Details  Location: shoulder - R subacromial bursa  Preparation: Patient was prepped and draped in the usual sterile fashion  Needle size: 22 G  Ultrasound guidance: no  Approach: posterolateral  Medications administered: 1 mL bupivacaine 0.25 %; 1 mL lidocaine 1 %; 1 mL methylPREDNISolone acetate 40 mg/mL    Patient tolerance: patient tolerated the procedure well with no immediate complications  Dressing:  Sterile dressing applied      Large joint arthrocentesis: L subacromial bursa  [place] Protocol:  procedure performed by consultantConsent: Verbal consent obtained.  Risks and benefits: risks, benefits and alternatives were discussed  Consent given by: patient  Timeout called at: 12/27/2024 2:22 PM.  Patient understanding: patient states understanding of the procedure being performed  Site marked: the operative site was marked  Radiology Images displayed and confirmed. If images not available, report reviewed: imaging studies available  Patient identity confirmed: verbally with patient  Supporting Documentation  Indications: pain   Procedure Details  Location: shoulder - L subacromial bursa  Preparation: Patient was prepped and draped in the usual sterile fashion  Needle size: 22 G  Ultrasound guidance: no  Approach: anterior  Medications administered: 1 mL bupivacaine  0.25 %; 1 mL lidocaine 1 %; 1 mL methylPREDNISolone acetate 40 mg/mL    Patient tolerance: patient tolerated the procedure well with no immediate complications  Dressing:  Sterile dressing applied          Surgery:     No surgery is recommended at this point, continue with conservative treatment plan as noted.      Follow up:    No follow-ups on file.      Chief Complaint   Patient presents with    Left Shoulder - Follow-up    Right Shoulder - Follow-up         History of Present Illness:    The patient is returns for follow up of his bilateral shoulder pain.  Since the prior visit, He reports minimal improvement with his left shoulder.  He has utilized the meloxicam with temporary symptomatic relief.  He states that he did attempt to schedule formal physical therapy.  However there was a significant cost associated with this.  He has since began a home exercise program to work on gentle range of motion of the shoulder.  He has not progressed to strengthening at this point.  He does continue to work and does utilize magnetic lifts on the job to minimize his weightbearing of his upper extremities.  However there are times when he does have to unexpectedly use his shoulder more heavily resulting in increased pain.  He notes that during the day his right shoulder is more symptomatic than the left.  However at night both shoulders will experience pain to the anterior and superior aspect that he describes as a pulsing aching sensation which does result in loss of sleep.  He denies any distal paresthesias today.  He does wish to consider steroid injections today as previously discussed at his last visit.    Shoulder Surgical History:  None    Past Medical, Social and Family History:  Past Medical History:   Diagnosis Date    Allergic     Arthritis     Constipation     Hernia CT July    Irritable bowel syndrome     On file     Past Surgical History:   Procedure Laterality Date    COLONOSCOPY      [place]  ESOPHAGOGASTRODUODENOSCOPY TRANSORAL DIAGNOSTIC N/A 10/18/2018    Procedure: EGD AND COLONOSCOPY;  Surgeon: Saba Amaya DO;  Location: [facility] GI LAB;  Service: Gastroenterology    UPPER GASTROINTESTINAL ENDOSCOPY       No Known Allergies  Current Outpatient Medications on File Prior to Visit   Medication Sig Dispense Refill    Cetirizine HCl 10 MG CAPS Take by mouth as needed       clobetasol (TEMOVATE) 0.05 % external solution APPLY TOPICALLY TO AFFECTED AREA EVERY DAY 50 mL 2    fluticasone (FLONASE) 50 mcg/act nasal spray as needed  (Patient not taking: Reported on 11/9/2024)      ibuprofen (MOTRIN) 600 mg tablet Take 1 tablet (600 mg total) by mouth every 6 (six) hours as needed for moderate pain 30 tablet 0    lisinopril (ZESTRIL) 10 mg tablet TAKE 1 TABLET DAILY 90 tablet 1    lubiprostone (AMITIZA) 24 mcg capsule Take 1 capsule (24 mcg total) by mouth daily With meals (Patient not taking: Reported on 11/9/2024) 30 capsule 11    rosuvastatin (CRESTOR) 10 MG tablet TAKE 1 TABLET DAILY 90 tablet 1    tadalafil (CIALIS) 5 MG tablet Take 1 tablet (5 mg total) by mouth daily as needed for erectile dysfunction 10 tablet 5     No current facility-administered medications on file prior to visit.     Social History     Socioeconomic History    Marital status: /Civil Union     Spouse name: Not on file    Number of children: Not on file    Years of education: Not on file    Highest education level: Not on file   Occupational History    Not on file   Tobacco Use    Smoking status: Some Days     Types: Cigars    Smokeless tobacco: Never    Tobacco comments:     cigars daily   Vaping Use    Vaping status: Never Used   Substance and Sexual Activity    Alcohol use: Yes     Alcohol/week: 12.0 standard drinks of alcohol     Types: 12 Cans of beer per week     Comment: socially    Drug use: Never    Sexual activity: Yes     Partners: Female     Birth control/protection: Male Sterilization, None   Other Topics  "Concern    Not on file   Social History Narrative    Not on file     Social Drivers of Health     Financial Resource Strain: Not on file   Food Insecurity: Not on file   Transportation Needs: Not on file   Physical Activity: Not on file   Stress: Not on file   Social Connections: Not on file   Intimate Partner Violence: Not on file   Housing Stability: Not on file       I have reviewed the past medical, surgical, social and family history, medications and allergies as documented in the EMR.    Review of systems: ROS is negative other than that noted in the HPI.  Constitutional: Negative for fatigue and fever.      Physical Exam:    Height 5' 10\" (1.778 m), weight 89.8 kg (198 lb).    General/Constitutional: NAD, well developed, well nourished  HENT: Normocephalic, atraumatic  CV: Intact distal pulses, regular rate  Resp: No respiratory distress or labored breathing  GI: Soft and non-tender   Lymphatic: No lymphadenopathy palpated  Neuro: Alert and Oriented x 3, no focal deficits  Psych: Normal mood, normal affect, normal judgement, normal behavior  Skin: Warm, dry, no rashes, no erythema      Shoulder focused exam:       RIGHT LEFT    Scapula Atrophy Negative Negative     Winging Negative Negative     Protraction Negative Negative    Rotator cuff SS 5/5 5/5     IS 5/5 5/5     SubS 5/5 5/5    ROM     170     ER0 60 60     ER90 90    90     IR90 80    80     IRb L1    L1    TTP: AC Negative Negative     Biceps Positive Positive     Coracoid Negative Negative    Special Tests: O'Briens Positive Positive     Cross body Adduction Positive Positive     Speeds  Positive Positive     Theo's Negative Negative     Neer Positive Positive     Blue Positive Positive    Instability: Apprehension & relocation not tested not tested     Load & shift not tested not tested               UE NV Exam: +2 Radial pulses bilaterally  Sensation intact to light touch C5-T1 bilaterally, Radial/median/ulnar nerve motor " intact    Cervical ROM is full without pain, numbness or tingling      Shoulder Imaging    No new imaging today

## 2024-12-27 NOTE — PROGRESS NOTES
ORTHO [facility]ARE [facility] [address] [address]  [phone]       Angelo Perry  [id number]  1970    ORTHOPAEDIC SURGERY OUTPATIENT NOTE  12/27/2024      HISTORY:  54 y.o. male  ***    Past Medical History:   Diagnosis Date    Allergic     Arthritis     Constipation     Hernia CT July    Irritable bowel syndrome     On file       Past Surgical History:   Procedure Laterality Date    COLONOSCOPY      ID ESOPHAGOGASTRODUODENOSCOPY TRANSORAL DIAGNOSTIC N/A 10/18/2018    Procedure: EGD AND COLONOSCOPY;  Surgeon: Saba Amaya DO;  Location: [facility] GI LAB;  Service: Gastroenterology    UPPER GASTROINTESTINAL ENDOSCOPY         Social History     Socioeconomic History    Marital status: /Civil Union     Spouse name: Not on file    Number of children: Not on file    Years of education: Not on file    Highest education level: Not on file   Occupational History    Not on file   Tobacco Use    Smoking status: Some Days     Types: Cigars    Smokeless tobacco: Never    Tobacco comments:     cigars daily   Vaping Use    Vaping status: Never Used   Substance and Sexual Activity    Alcohol use: Yes     Alcohol/week: 12.0 standard drinks of alcohol     Types: 12 Cans of beer per week     Comment: socially    Drug use: Never    Sexual activity: Yes     Partners: Female     Birth control/protection: Male Sterilization, None   Other Topics Concern    Not on file   Social History Narrative    Not on file     Social Drivers of Health     Financial Resource Strain: Not on file   Food Insecurity: Not on file   Transportation Needs: Not on file   Physical Activity: Not on file   Stress: Not on file   Social Connections: Not on file   Intimate Partner Violence: Not on file   Housing Stability: Not on file       Family History   Problem Relation Age of Onset    Pancreatic cancer Mother     Hypertension Mother     Diabetes Mother     COPD Mother      "Arthritis Mother     Cancer Mother     Hepatitis Mother         Patient's Medications   New Prescriptions    No medications on file   Previous Medications    CETIRIZINE HCL 10 MG CAPS    Take by mouth as needed     CLOBETASOL (TEMOVATE) 0.05 % EXTERNAL SOLUTION    APPLY TOPICALLY TO AFFECTED AREA EVERY DAY    FLUTICASONE (FLONASE) 50 MCG/ACT NASAL SPRAY    as needed     IBUPROFEN (MOTRIN) 600 MG TABLET    Take 1 tablet (600 mg total) by mouth every 6 (six) hours as needed for moderate pain    LISINOPRIL (ZESTRIL) 10 MG TABLET    TAKE 1 TABLET DAILY    LUBIPROSTONE (AMITIZA) 24 MCG CAPSULE    Take 1 capsule (24 mcg total) by mouth daily With meals    MELOXICAM (MOBIC) 15 MG TABLET    Take 1 tablet (15 mg total) by mouth daily    ROSUVASTATIN (CRESTOR) 10 MG TABLET    TAKE 1 TABLET DAILY    TADALAFIL (CIALIS) 5 MG TABLET    Take 1 tablet (5 mg total) by mouth daily as needed for erectile dysfunction   Modified Medications    No medications on file   Discontinued Medications    No medications on file       No Known Allergies     Ht 5' 10\" (1.778 m)   Wt 89.8 kg (198 lb)   BMI 28.41 kg/m²      REVIEW OF SYSTEMS:  Constitutional: Negative.    HEENT: Negative.    Respiratory: Negative.    Skin: Negative.    Neurological: Negative.    Psychiatric/Behavioral: Negative.  Musculoskeletal: Negative except for that mentioned in the HPI.    PHYSICAL EXAM:      R elbow:  Flexion: 140 degrees  Extension: 0 degrees  Pronation: 80 degrees  Supination: 80 degrees    TTP Lateral Epicondyle: negative  TTP Medial Epicondyle: negative  TTP Olecranon: negative  TTP Radial Head: negative  TTP Biceps Tendon: negative    Strength:  Flexion: 5/5  Extension: 5/5  Pronation: 5/5  Supination: 5/5    Pain with resisted wrist extension: negative  Pain with resisted 3rd finger extension: negative  Pain with resisted wrist flexion: negative    Varus laxity: negative  Valgus laxity: negative  Milking maneuver: negative  Moving valgus stress test: " negative    Cubital tunnel Tinel's: negative    Radial/median/ulnar nerve intact    <2 sec cap refill      L elbow:  Flexion: 140 degrees  Extension: 0 degrees  Pronation: 80 degrees  Supination: 80 degrees    TTP Lateral Epicondyle: negative  TTP Medial Epicondyle: negative  TTP Olecranon: negative  TTP Radial Head: negative  TTP Biceps Tendon: negative    Strength:  Flexion: 5/5  Extension: 5/5  Pronation: 5/5  Supination: 5/5    Pain with resisted wrist extension: negative  Pain with resisted 3rd finger extension: negative  Pain with resisted wrist flexion: negative    Varus laxity: negative  Valgus laxity: negative  Milking maneuver: negative  Moving valgus stress test: negative    Cubital tunnel Tinel's: negative    Radial/median/ulnar nerve intact    <2 sec cap refill    Neck:   Spurling's Maneuver: negative  FROM flexion, extension, rotation, sidebending    Reflexes:   Triceps: symmetric bilaterally  Biceps: symmetric bilaterally  Brachioradialis: symmetric bilaterally      Integumentary: -  Bruising: -  Abrasion: -   Rash -  Laceration: -      IMAGING:  ***    ASSESSMENT AND PLAN:  54 y.o. male  ***

## 2025-01-01 RX ORDER — BUPIVACAINE HYDROCHLORIDE 2.5 MG/ML
1 INJECTION, SOLUTION INFILTRATION; PERINEURAL
Status: COMPLETED | OUTPATIENT
Start: 2024-12-27 | End: 2024-12-27

## 2025-01-01 RX ORDER — LIDOCAINE HYDROCHLORIDE 10 MG/ML
1 INJECTION, SOLUTION INFILTRATION; PERINEURAL
Status: COMPLETED | OUTPATIENT
Start: 2024-12-27 | End: 2024-12-27

## 2025-01-01 RX ORDER — METHYLPREDNISOLONE ACETATE 40 MG/ML
1 INJECTION, SUSPENSION INTRA-ARTICULAR; INTRALESIONAL; INTRAMUSCULAR; SOFT TISSUE
Status: COMPLETED | OUTPATIENT
Start: 2024-12-27 | End: 2024-12-27

## 2025-01-09 DIAGNOSIS — L40.9 SCALP PSORIASIS: ICD-10-CM

## 2025-01-10 RX ORDER — CLOBETASOL PROPIONATE 0.5 MG/ML
SOLUTION TOPICAL
Qty: 50 ML | Refills: 2 | Status: SHIPPED | OUTPATIENT
Start: 2025-01-10

## 2025-03-28 ENCOUNTER — OFFICE VISIT (OUTPATIENT)
Dept: OBGYN CLINIC | Facility: CLINIC | Age: 55
End: 2025-03-28
Payer: COMMERCIAL

## 2025-03-28 VITALS — HEIGHT: 70 IN | BODY MASS INDEX: 28.35 KG/M2 | WEIGHT: 198 LBS

## 2025-03-28 DIAGNOSIS — M75.81 ROTATOR CUFF TENDINITIS, RIGHT: ICD-10-CM

## 2025-03-28 DIAGNOSIS — M75.21 BICEPS TENDINITIS OF RIGHT SHOULDER: Primary | ICD-10-CM

## 2025-03-28 DIAGNOSIS — M75.41 IMPINGEMENT SYNDROME OF RIGHT SHOULDER: ICD-10-CM

## 2025-03-28 DIAGNOSIS — M75.42 IMPINGEMENT SYNDROME OF LEFT SHOULDER: ICD-10-CM

## 2025-03-28 DIAGNOSIS — M75.82 ROTATOR CUFF TENDINITIS, LEFT: ICD-10-CM

## 2025-03-28 DIAGNOSIS — M75.22 BICEPS TENDINITIS OF LEFT SHOULDER: ICD-10-CM

## 2025-03-28 DIAGNOSIS — M75.111 NONTRAUMATIC INCOMPLETE TEAR OF RIGHT ROTATOR CUFF: ICD-10-CM

## 2025-03-28 PROCEDURE — 99213 OFFICE O/P EST LOW 20 MIN: CPT | Performed by: STUDENT IN AN ORGANIZED HEALTH CARE EDUCATION/TRAINING PROGRAM

## 2025-03-28 NOTE — PROGRESS NOTES
[facility] Sports Medicine Shoulder Follow Up Visit     Assesment:   55 y.o. male bilateral shoulder biceps long head tenosynovitis and subacromial impingement syndrome    Assessment & Plan  Biceps tendinitis of right shoulder  We reviewed their current history, physical exam, and imaging in detail today with the patient.   We discussed getting an MRI to further evaluate the structural integrity of his right shoulder as this is the shoulder that is causing him the most pain.  He continues to have pain despite extensive conservative treatment including physical therapy, home exercise program, activity modification, steroid injection, and NSAIDs for over 6 weeks, so we will obtain an MRI to further evaluate at this point and to confirm no rotator cuff tear.  This will help further evaluate his continued pain and guide treatment options moving forward. Based on the MRI we can further evaluate if repeat injections would be the best course of action for him. He will continue to take meloxicam as needed as we have previously discussed at past visits. All of their questions were answered in detail today.  The patient is agreeable to this plan.  They will follow-up in clinic after his MRI is obtained for MRI review.  Orders:    MRI shoulder right wo contrast; Future    Rotator cuff tendinitis, right    Orders:    MRI shoulder right wo contrast; Future    Impingement syndrome of right shoulder         Biceps tendinitis of left shoulder         Rotator cuff tendinitis, left         Impingement syndrome of left shoulder         Nontraumatic incomplete tear of right rotator cuff           Conservative treatment:    Ice to shoulder 1-2 times daily, for 20 minutes at a time.  Home exercise program for shoulder, including ROM and strenthening.  Instructions given to patient of what exercises to perform.      Imaging:    All imaging from today was reviewed by myself and explained to the patient.   We will order a right shoulder MRI for  further evaluation.       Injection:    No Injection planned at this time.  May consider future corticosteroid injection depending on clinical exam/imaging.      Surgery:     No surgery is recommended at this point, continue with conservative treatment plan as noted.      Follow up:    No follow-ups on file.      Chief Complaint   Patient presents with    Left Shoulder - Follow-up    Right Shoulder - Follow-up         History of Present Illness:    The patient is returns for follow up of bilateral shoulder biceps long head tenosynovitis and subacromial impingement syndrome .  Since the prior visit, He reports significant improvement.  He reports that he had significant relief from his bilateral injections for 6 weeks.  He reports since then he has had intermittent pain, some days are better than others.  He has been able to do more activity as well since the injections.  He is continuing to have pain, but reports that this is significantly less than when we first saw him.  He has been continuing to do an at home exercise program and states this is going well.  He has also been taking meloxicam as needed.  He is continuing to have pain but continuing to progress in a positive direction.  He has completed extensive conservative treatment including prescription NSAIDs, physical therapy, and home exercise program.    Shoulder Surgical History:  None    Past Medical, Social and Family History:  Past Medical History:   Diagnosis Date    Allergic     Arthritis     Constipation     Hernia CT July    Irritable bowel syndrome     On file     Past Surgical History:   Procedure Laterality Date    COLONOSCOPY      AL ESOPHAGOGASTRODUODENOSCOPY TRANSORAL DIAGNOSTIC N/A 10/18/2018    Procedure: EGD AND COLONOSCOPY;  Surgeon: Saba Amaya DO;  Location: [facility] GI LAB;  Service: Gastroenterology    UPPER GASTROINTESTINAL ENDOSCOPY       No Known Allergies  Current Outpatient Medications on File Prior to Visit   Medication Sig  Dispense Refill    Cetirizine HCl 10 MG CAPS Take by mouth as needed       clobetasol (TEMOVATE) 0.05 % external solution APPLY TOPICALLY TO AFFECTED AREA EVERY DAY 50 mL 2    ibuprofen (MOTRIN) 600 mg tablet Take 1 tablet (600 mg total) by mouth every 6 (six) hours as needed for moderate pain 30 tablet 0    lisinopril (ZESTRIL) 10 mg tablet TAKE 1 TABLET DAILY 90 tablet 1    meloxicam (Mobic) 15 mg tablet Take 1 tablet (15 mg total) by mouth daily 30 tablet 2    rosuvastatin (CRESTOR) 10 MG tablet TAKE 1 TABLET DAILY 90 tablet 1    tadalafil (CIALIS) 5 MG tablet Take 1 tablet (5 mg total) by mouth daily as needed for erectile dysfunction 10 tablet 5    fluticasone (FLONASE) 50 mcg/act nasal spray as needed  (Patient not taking: Reported on 11/9/2024)      lubiprostone (AMITIZA) 24 mcg capsule Take 1 capsule (24 mcg total) by mouth daily With meals (Patient not taking: Reported on 3/28/2025) 30 capsule 11     No current facility-administered medications on file prior to visit.     Social History     Socioeconomic History    Marital status: /Civil Union     Spouse name: Not on file    Number of children: Not on file    Years of education: Not on file    Highest education level: Not on file   Occupational History    Not on file   Tobacco Use    Smoking status: Some Days     Types: Cigars    Smokeless tobacco: Never    Tobacco comments:     cigars daily   Vaping Use    Vaping status: Never Used   Substance and Sexual Activity    Alcohol use: Yes     Alcohol/week: 12.0 standard drinks of alcohol     Types: 12 Cans of beer per week     Comment: socially    Drug use: Never    Sexual activity: Yes     Partners: Female     Birth control/protection: Male Sterilization, None   Other Topics Concern    Not on file   Social History Narrative    Not on file     Social Drivers of Health     Financial Resource Strain: Not on file   Food Insecurity: Not on file   Transportation Needs: Not on file   Physical Activity: Not on  "file   Stress: Not on file   Social Connections: Not on file   Intimate Partner Violence: Not on file   Housing Stability: Not on file       I have reviewed the past medical, surgical, social and family history, medications and allergies as documented in the EMR.    Review of systems: ROS is negative other than that noted in the HPI.  Constitutional: Negative for fatigue and fever.      Physical Exam:    Height 5' 10\" (1.778 m), weight 89.8 kg (198 lb).    General/Constitutional: NAD, well developed, well nourished  HENT: Normocephalic, atraumatic  CV: Intact distal pulses, regular rate  Resp: No respiratory distress or labored breathing  GI: Soft and non-tender   Lymphatic: No lymphadenopathy palpated  Neuro: Alert and Oriented x 3, no focal deficits  Psych: Normal mood, normal affect, normal judgement, normal behavior  Skin: Warm, dry, no rashes, no erythema      Shoulder focused exam:         RIGHT LEFT     Scapula Atrophy Negative Negative       Winging Negative Negative       Protraction Negative Negative     Rotator cuff SS 5/5 5/5       IS 5/5 5/5       SubS 5/5 5/5     ROM     170       ER0 60 60       ER90 90    90       IR90 80    80       IRb L1    L1     TTP: AC Negative Negative       Biceps Positive Positive       Coracoid Negative Negative     Special Tests: O'Briens Positive Positive       Cross body Adduction Positive Positive       Speeds  Positive Positive       Theo's Positive  Negative       Neer Positive Positive       Blue Positive Positive     Instability: Apprehension & relocation not tested not tested       Load & shift not tested not tested                      UE NV Exam: +2 Radial pulses bilaterally  Sensation intact to light touch C5-T1 bilaterally, Radial/median/ulnar nerve motor intact     Cervical ROM is full without pain, numbness or tingling      Shoulder Imaging    No imaging was performed today, prior x-rays reviewed      Scribe Attestation      I,:  Moni Morin am " acting as a scribe while in the presence of the attending physician.:       I,:  Eric Pastor MD personally performed the services described in this documentation    as scribed in my presence.:

## 2025-04-02 ENCOUNTER — TELEPHONE (OUTPATIENT)
Dept: DERMATOLOGY | Facility: CLINIC | Age: 55
End: 2025-04-02

## 2025-04-02 NOTE — TELEPHONE ENCOUNTER
Patient called the RX Refill Line. Message is being forwarded to the office.     Patient is requesting a call back from the office he would like to discuss getting UV treatment. Has not been seen since 2021. Advised that he would probably need to be seen. Patient requesting a call back.      Please contact patient at [phone]

## 2025-04-02 NOTE — TELEPHONE ENCOUNTER
No Patient has not been seen since 2021. Would be considered new patient? Okay to schedule with an ap?

## 2025-04-21 ENCOUNTER — TELEPHONE (OUTPATIENT)
Dept: GASTROENTEROLOGY | Facility: CLINIC | Age: 55
End: 2025-04-21

## 2025-04-21 NOTE — TELEPHONE ENCOUNTER
Called and left a message for the patient to call back to schedule 1 year follow up office visit from recall. Sent letter via myc

## 2025-04-24 DIAGNOSIS — M75.21 BICEPS TENDONITIS ON RIGHT: ICD-10-CM

## 2025-04-24 DIAGNOSIS — M75.22 BICEPS TENDONITIS ON LEFT: ICD-10-CM

## 2025-04-24 RX ORDER — MELOXICAM 15 MG/1
15 TABLET ORAL DAILY
Qty: 30 TABLET | Refills: 2 | Status: SHIPPED | OUTPATIENT
Start: 2025-04-24 | End: 2025-07-23

## 2025-05-14 ENCOUNTER — OFFICE VISIT (OUTPATIENT)
Dept: DERMATOLOGY | Facility: CLINIC | Age: 55
End: 2025-05-14
Payer: COMMERCIAL

## 2025-05-14 ENCOUNTER — TELEPHONE (OUTPATIENT)
Dept: DERMATOLOGY | Facility: CLINIC | Age: 55
End: 2025-05-14

## 2025-05-14 VITALS — HEIGHT: 70 IN | TEMPERATURE: 97 F | BODY MASS INDEX: 28.63 KG/M2 | WEIGHT: 200 LBS

## 2025-05-14 DIAGNOSIS — L40.9 PSORIASIS: Primary | ICD-10-CM

## 2025-05-14 DIAGNOSIS — L40.9 PSORIASIS: ICD-10-CM

## 2025-05-14 PROCEDURE — 99204 OFFICE O/P NEW MOD 45 MIN: CPT | Performed by: NURSE PRACTITIONER

## 2025-05-14 RX ORDER — APREMILAST 10-20-30MG
KIT ORAL
Qty: 55 EACH | Refills: 0 | Status: CANCELLED | OUTPATIENT
Start: 2025-05-14

## 2025-05-14 RX ORDER — APREMILAST 30 MG/1
30 TABLET, FILM COATED ORAL 2 TIMES DAILY
Qty: 60 TABLET | Refills: 11 | Status: CANCELLED | OUTPATIENT
Start: 2025-05-14

## 2025-05-14 RX ORDER — TRIAMCINOLONE ACETONIDE 1 MG/G
OINTMENT TOPICAL
Qty: 453.6 G | Refills: 1 | Status: SHIPPED | OUTPATIENT
Start: 2025-05-14

## 2025-05-14 NOTE — PROGRESS NOTES
"[facility] Dermatology Clinic Note     Patient Name: Angelo Perry  Encounter Date: 05/14/2025       Have you been cared for by a [facility] Dermatologist in the last 3 years and, if so, which description applies to you? NO. I am considered a \"new\" patient and must complete all patient intake questions. I am not of child-bearing potential.     REVIEW OF SYSTEMS:  Have you recently had or currently have any of the following? Recent fever or chills? No  Any non-healing wound? No   PAST MEDICAL HISTORY:  Have you personally ever had or currently have any of the following?  If \"YES,\" then please provide more detail. Skin cancer (such as Melanoma, Basal Cell Carcinoma, Squamous Cell Carcinoma?  No  Tuberculosis, HIV/AIDS, Hepatitis B or C: No  Radiation Treatment No   HISTORY OF IMMUNOSUPPRESSION:   Do you have a history of any of the following:  Systemic Immunosuppression such as Diabetes, Biologic or Immunotherapy, Chemotherapy, Organ Transplantation, Bone Marrow Transplantation or Prednisone?  No     Answering \"YES\" requires the addition of the dotphrase \"IMMUNOSUPPRESSED\" as the first diagnosis of the patient's visit.   FAMILY HISTORY:  Any \"first degree relatives\" (parent, brother, sister, or child) with the following?    Skin Cancer, Pancreatic or Other Cancer? No   PATIENT EXPERIENCE:    Do you want the Dermatologist to perform a COMPLETE skin exam today including a clinical examination under the \"bra and underwear\" areas?  NO  If necessary, do we have your permission to call and leave a detailed message on your Preferred Phone number that includes your specific medical information?  Yes      Allergies[1] Current Medications[2]              Whom besides the patient is providing clinical information about today's encounter?   NO ADDITIONAL HISTORIAN (patient alone provided history)    Physical Exam and Assessment/Plan by Diagnosis:      PSORIASIS f/u     Physical Exam:  Anatomic Location: scalp, elbows, knees, " trunk, and back  Morphologic Description:  Pink patches with silvery scales  Pustules present? YES  Severity: severe  Body Percent Affected: >35%  Pertinent Positives:  Itching? YES  Nail dystrophy (pitting, onycholysis, and/or hyperkeratosis)? No  Dactylitis?  No  Pertinent Negatives:    Additional History of Present Condition:  present for years. He has been completing at home phototherapy, but ran out of session. He is not currently using topicals but he has tried and fail clobetasol, triamcinolone and many others. Patient is interested in trying systemic alternatives at this time.   Family history of psoriasis?  No  Recent infection (strep throat)? No  Psoriatic Arthritis (PEST) Screen:   Have you ever had a swollen joint or joints?  No  Has your doctor ever told you that you have arthritis?  No  Do your fingernails or toenails have holes or pits?    No  Have you had pain in your heel?  No  Have you ever had a finger or toe that was painful or swollen for no apparent reason?  No  Rheumatoid factor NEGATIVITY?  No  Personal history of dactylitis?  No  IMAGING STUDIES:  Juxta-articular new bone formation?  No    Plan:  Discussed chronic nature of disease. Psoriasis tends to persist lifelong and fluctuates in extent and severity. To help manage the disease, decrease factors that aggravate psoriasis. This includes treating streptococcal infections, minimizing skin injuries, avoiding sun exposure if it exacerbates psoriasis, avoiding smoking and alcohol usage, decreasing stress, and maintaining an optimal body weight. Certain medications such as lithium, beta blockers, antimalarials, and NSAIDs have also been implicated. Suddenly stopping oral steroids or strong topical steroids can cause rebound disease.   Dry skin is more susceptible to outbreaks of psoriasis. Practice moisturizing throughout the day and after bathing or showering to reduce itching, dryness and scaling.  Topical Therapy: Triamcinolone 0.1%  "ointment: Apply a thin layer to affected areas twice daily. Do not apply to face, armpits, or groin. Make sure to give your skin breaks to avoid skin thinning and stretch marks. Apply for two weeks with a one week break in between, or apply for five days with a two day break in between.  Systemic Therapy: Otezla (apremilast) oral tablet: \"ADULT DOSE\" TITRATION INDUCTION: To reduce the risk of gastrointestinal symptoms, titrate to the recommended dosage of 30 mg twice daily according to the following schedule: Day 1: Take 10 mg in the morning  Day 2: Take 10 mg in the morning and 10 mg in the evening  Day 3: Take 10 mg in the morning and 20 mg in the evening  Day 4: Take 20 mg in the morning and 20 mg in the evening  Day 5: Take 20 mg in the morning and 30 mg in the evening  Day 6 and thereafter: Take 30 mg twice daily  \"ADULT DOSE\" MAINTENANCE: Take 30 mg twice daily starting on Day 6.  MOST COMMON SIDE EFFECTS: In clinical studies involving adults with moderate to severe plaque psoriasis, the most common side effects of Otezla (occurring in over 5% of patients) included diarrhea, nausea, upper respiratory tract infection, tension headache, and headache. Most events occurred within the first few weeks of treatment. Patients 65 years of age or older and patients taking medications that can lead to volume depletion or hypotension may be at a higher risk of complications from severe diarrhea, nausea, or vomiting. We discussed contacting the office about any side effect that is bothersome or does not go away.   DEPRESSION: Otezla is associated with an increase in depression. During clinical trials in patients with moderate to severe plaque psoriasis, 1.3% (12/920) of Otezla patients reported depression compared to 0.4% (2/506) on placebo. Some patients stopped taking Otezla due to depression, and some reported suicidal behavior while taking Otezla. Before starting Otezla, inform the doctor if you are currently " experiencing or have a history of feelings of depression, or suicidal thoughts or behavior. We discussed that if any of these symptoms or other mood changes develop or worsen during treatment with Otezla, call the office.   WEIGHT LOSS: Some patients taking Otezla have lost body weight. Body weight loss of greater than 10% occurred in 2% (16/784) of patients treated with Otezla compared to 1% (3/382) of patients treated with placebo. While on Otezla, monitor your weight and call the doctor if unexplained or significant weight loss occurs.   DRUG INTERACTIONS: Apremilast exposure was decreased when Otezla was co-administered with rifampin, a strong OGM518 enzyme inducer. Concomitant use of Otezla with SLE513 enzyme inducers (e.g., rifampin, phenobarbital, carbamazepine, phenytoin) is not recommended, as loss of Otezla efficacy may occur.   ALLERGIES: DO NOT take Otezla if you are allergic to apremilast or to any of the ingredients.   PREGNANCY: Otezla has not been studied in pregnant women. We discussed the potential risk of fetal loss and risks and benefits of becoming pregnant while on Otezla.   PA for Otezla initiated. No hx of depression/anxiety. No GI hx.      PROCEDURES PERFORMED TODAY ASSOCIATED WITH THIS CONDITION:               Medical Complexity:    CHRONIC ILLNESS (expected duration of >1 year):  EXACERBATION, PROGRESSION, OR SIDE EFFECTS OF TREATMENT.  Acutely worsening, poorly controlled, or progressing.  Intent is to control progression and requires additional supportive care or attention to treatment for side effects but not at level of consideration of hospital level of care.        Scribe Attestation      I,:  Sandie Chiu MA am acting as a scribe while in the presence of the attending physician.:       I,:  ELIDA Orta personally performed the services described in this documentation    as scribed in my presence.:                  [1] No Known Allergies  [2]   Current Outpatient  Medications:     Cetirizine HCl 10 MG CAPS, Take by mouth as needed, Disp: , Rfl:     clobetasol (TEMOVATE) 0.05 % external solution, APPLY TOPICALLY TO AFFECTED AREA EVERY DAY, Disp: 50 mL, Rfl: 2    lisinopril (ZESTRIL) 10 mg tablet, TAKE 1 TABLET DAILY, Disp: 90 tablet, Rfl: 1    meloxicam (Mobic) 15 mg tablet, Take 1 tablet (15 mg total) by mouth daily, Disp: 30 tablet, Rfl: 2    rosuvastatin (CRESTOR) 10 MG tablet, TAKE 1 TABLET DAILY, Disp: 90 tablet, Rfl: 1    fluticasone (FLONASE) 50 mcg/act nasal spray, as needed  (Patient not taking: Reported on 11/9/2024), Disp: , Rfl:     ibuprofen (MOTRIN) 600 mg tablet, Take 1 tablet (600 mg total) by mouth every 6 (six) hours as needed for moderate pain, Disp: 30 tablet, Rfl: 0    lubiprostone (AMITIZA) 24 mcg capsule, Take 1 capsule (24 mcg total) by mouth daily With meals (Patient not taking: Reported on 3/28/2025), Disp: 30 capsule, Rfl: 11    tadalafil (CIALIS) 5 MG tablet, Take 1 tablet (5 mg total) by mouth daily as needed for erectile dysfunction, Disp: 10 tablet, Rfl: 5

## 2025-05-14 NOTE — PATIENT INSTRUCTIONS
"PSORIASIS    Plan:  Discussed chronic nature of disease. Psoriasis tends to persist lifelong and fluctuates in extent and severity. To help manage the disease, decrease factors that aggravate psoriasis. This includes treating streptococcal infections, minimizing skin injuries, avoiding sun exposure if it exacerbates psoriasis, avoiding smoking and alcohol usage, decreasing stress, and maintaining an optimal body weight. Certain medications such as lithium, beta blockers, antimalarials, and NSAIDs have also been implicated. Suddenly stopping oral steroids or strong topical steroids can cause rebound disease.   Dry skin is more susceptible to outbreaks of psoriasis. Practice moisturizing throughout the day and after bathing or showering to reduce itching, dryness and scaling.  Topical Therapy: Triamcinolone 0.1% ointment: Apply a thin layer to affected areas twice daily. Do not apply to face, armpits, or groin. Make sure to give your skin breaks to avoid skin thinning and stretch marks. Apply for two weeks with a one week break in between, or apply for five days with a two day break in between.  Systemic Therapy: Otezla (apremilast) oral tablet: \"ADULT DOSE\" TITRATION INDUCTION: To reduce the risk of gastrointestinal symptoms, titrate to the recommended dosage of 30 mg twice daily according to the following schedule: Day 1: Take 10 mg in the morning  Day 2: Take 10 mg in the morning and 10 mg in the evening  Day 3: Take 10 mg in the morning and 20 mg in the evening  Day 4: Take 20 mg in the morning and 20 mg in the evening  Day 5: Take 20 mg in the morning and 30 mg in the evening  Day 6 and thereafter: Take 30 mg twice daily  \"ADULT DOSE\" MAINTENANCE: Take 30 mg twice daily starting on Day 6.  MOST COMMON SIDE EFFECTS: In clinical studies involving adults with moderate to severe plaque psoriasis, the most common side effects of Otezla (occurring in over 5% of patients) included diarrhea, nausea, upper respiratory " tract infection, tension headache, and headache. Most events occurred within the first few weeks of treatment. Patients 65 years of age or older and patients taking medications that can lead to volume depletion or hypotension may be at a higher risk of complications from severe diarrhea, nausea, or vomiting. We discussed contacting the office about any side effect that is bothersome or does not go away.   DEPRESSION: Otezla is associated with an increase in depression. During clinical trials in patients with moderate to severe plaque psoriasis, 1.3% (12/920) of Otezla patients reported depression compared to 0.4% (2/506) on placebo. Some patients stopped taking Otezla due to depression, and some reported suicidal behavior while taking Otezla. Before starting Otezla, inform the doctor if you are currently experiencing or have a history of feelings of depression, or suicidal thoughts or behavior. We discussed that if any of these symptoms or other mood changes develop or worsen during treatment with Otezla, call the office.   WEIGHT LOSS: Some patients taking Otezla have lost body weight. Body weight loss of greater than 10% occurred in 2% (16/784) of patients treated with Otezla compared to 1% (3/382) of patients treated with placebo. While on Otezla, monitor your weight and call the doctor if unexplained or significant weight loss occurs.   DRUG INTERACTIONS: Apremilast exposure was decreased when Otezla was co-administered with rifampin, a strong DQZ853 enzyme inducer. Concomitant use of Otezla with AYO019 enzyme inducers (e.g., rifampin, phenobarbital, carbamazepine, phenytoin) is not recommended, as loss of Otezla efficacy may occur.   ALLERGIES: DO NOT take Otezla if you are allergic to apremilast or to any of the ingredients.   PREGNANCY: Otezla has not been studied in pregnant women. We discussed the potential risk of fetal loss and risks and benefits of becoming pregnant while on Otezla.

## 2025-05-15 NOTE — TELEPHONE ENCOUNTER
PA for Otezla SUBMITTED to [facility]     via    [x]M-KEY: [id number]  []Surescripts-Case ID #   []Availity-Auth ID # NDC #   []Faxed to plan   []Other website   []Phone call Case ID #     [x]PA sent as URGENT    All office notes, labs and other pertaining documents and studies sent. Clinical questions answered. Awaiting determination from insurance company.     Turnaround time for your insurance to make a decision on your Prior Authorization can take 7-21 business days.

## 2025-05-16 NOTE — TELEPHONE ENCOUNTER
Rec'd call from patient stating that he spoke with the pharmacy and was told Otezla medication would need to be sent to a specialty pharmacy. I advised patient of previous note. Patient verbalized understanding.

## 2025-05-16 NOTE — TELEPHONE ENCOUNTER
PA for Otezla Tablets  APPROVED     Date(s) approved until 05/14/2026    Case #[id number]    Patient advised by          []MyChart Message  [x]Phone call   []LMOM  []L/M to call office as no active Communication consent on file  []Unable to leave detailed message as VM not approved on Communication consent       Pharmacy advised by    [x]Fax  []Phone call  []Secure Chat    Specialty Pharmacy    [x][facility]      Approval letter scanned into Media Yes

## 2025-05-19 DIAGNOSIS — E78.00 PURE HYPERCHOLESTEROLEMIA: ICD-10-CM

## 2025-05-19 DIAGNOSIS — I10 ESSENTIAL HYPERTENSION: ICD-10-CM

## 2025-05-20 RX ORDER — ROSUVASTATIN CALCIUM 10 MG/1
10 TABLET, COATED ORAL DAILY
Qty: 90 TABLET | Refills: 3 | Status: SHIPPED | OUTPATIENT
Start: 2025-05-20

## 2025-05-20 RX ORDER — LISINOPRIL 10 MG/1
10 TABLET ORAL DAILY
Qty: 90 TABLET | Refills: 3 | Status: SHIPPED | OUTPATIENT
Start: 2025-05-20

## 2025-05-22 NOTE — TELEPHONE ENCOUNTER
"Received call from Luis at [facility] re rx for starter pack of otezla, notes only maintenance dosing rx able to be received when transferred from local [facility]. Requested new rx to be sent, resent to specialty pharmacy per protocol. No other questions at this time.  FOLLOW UP: none needed    REASON FOR CONVERSATION: PA for Otezla, PA for Otezla Approved, and Medication Problem    SYMPTOMS: n/a    OTHER: resent to specialty pharmacy per request    DISPOSITION: No disposition on file.    Answer Assessment - Initial Assessment Questions  1. NAME of MEDICINE: \"What medicine(s) are you calling about?\"      Otezla starter pack  2. QUESTION: \"What is your question?\" (e.g., double dose of medicine, side effect)      Resend to specialty pharmacy  3. PRESCRIBER: \"Who prescribed the medicine?\" Reason: if prescribed by specialist, call should be referred to that group.      miky    Protocols used: Medication Question Call-Adult-    " no

## 2025-05-22 NOTE — TELEPHONE ENCOUNTER
Rec'd call from Niru at [facility] - she stated that the prescriptions for Otezla rec'd, were invalid. She is requesting that they be resent ASAP. She advised that it looks like a system error and not any error on the actually script.    Please advise. Any questions please call [facility] - Ph # [phone] Fax # [phone]

## 2025-05-22 NOTE — ADDENDUM NOTE
Addended by: JUNE GRADY on: 5/22/2025 03:15 PM     Modules accepted: Orders     Burow's Advancement Flap Text: The defect edges were debeveled with a #15 scalpel blade.  Given the location of the defect and the proximity to free margins a Burow's advancement flap was deemed most appropriate.  Using a sterile surgical marker, the appropriate advancement flap was drawn incorporating the defect and placing the expected incisions within the relaxed skin tension lines where possible.    The area thus outlined was incised deep to adipose tissue with a #15 scalpel blade.  The skin margins were undermined to an appropriate distance in all directions utilizing iris scissors.

## 2025-05-23 DIAGNOSIS — L40.9 PSORIASIS: Primary | ICD-10-CM

## 2025-05-30 ENCOUNTER — TELEPHONE (OUTPATIENT)
Dept: UROLOGY | Facility: AMBULATORY SURGERY CENTER | Age: 55
End: 2025-05-30

## 2025-05-30 NOTE — TELEPHONE ENCOUNTER
LVM for patient to call urology back with a reason for his visit next Tuesday, June 3rd with MANUEL Rogers. It looks like he made the appointment via [employer] but there is not reason given.    ** IF PATIENT CALL BACK, PLEASE GET A REASON FOR HIS WANTING THE APPOINTMENT AND PLEASE ADD IT TO THE APPOINTMENT NOTE. Thank you!

## 2025-06-03 ENCOUNTER — OFFICE VISIT (OUTPATIENT)
Dept: UROLOGY | Facility: AMBULATORY SURGERY CENTER | Age: 55
End: 2025-06-03
Payer: COMMERCIAL

## 2025-06-03 VITALS
HEART RATE: 78 BPM | OXYGEN SATURATION: 97 % | WEIGHT: 200 LBS | BODY MASS INDEX: 28.63 KG/M2 | HEIGHT: 70 IN | SYSTOLIC BLOOD PRESSURE: 130 MMHG | DIASTOLIC BLOOD PRESSURE: 80 MMHG

## 2025-06-03 DIAGNOSIS — N48.6 PEYRONIE'S DISEASE: ICD-10-CM

## 2025-06-03 DIAGNOSIS — Z12.5 SCREENING FOR PROSTATE CANCER: Primary | ICD-10-CM

## 2025-06-03 DIAGNOSIS — N52.9 ERECTILE DYSFUNCTION, UNSPECIFIED ERECTILE DYSFUNCTION TYPE: ICD-10-CM

## 2025-06-03 PROCEDURE — 99204 OFFICE O/P NEW MOD 45 MIN: CPT

## 2025-06-03 NOTE — ASSESSMENT & PLAN NOTE
Patient denies a known family history of for prostate cancer  Patient's last PSA was performed 3/15/2024 and found to be 1.01.  We discussed that the patient is due for repeat PSA testing.  New PSA order placed in the patient's chart and our office will call with results.

## 2025-06-03 NOTE — ASSESSMENT & PLAN NOTE
We discussed that I suspect the patient is suffering from Peyronie's disease with his history of spontaneous curvature  We discussed that a referral to my partner, Dr. Alonso, specializes in the treatment of Peyronie's disease.  We discussed that at their initial evaluation an erection would be spontaneously produced utilizing intracavernosal injection therapy with Trimix for the degree of curvature to be evaluated in the office.  We discussed that the medication cost $65 and that he is required to bring it to the evaluation appointment.  Patient would like to pursue therapy for his Peyronie's disease prior to pursuing erectile dysfunction medication.  We discussed that I will fax over Trimix to [facility] and that he was instructed to pick it up either a few days prior or the day of his office visit and to bring it with him to the appointment.

## 2025-06-03 NOTE — ASSESSMENT & PLAN NOTE
Patient previously trialed Viagra 100 mg and Cialis 20 mg as needed  We discussed that erectile dysfunction is multifactorial and that high blood pressure, high sugar levels, and high cholesterol can all contribute.  Additionally, we discussed that testosterone levels can affect the quality of our erections.  Patient does endorse fatigue, brain fog, inability to keep weight off, and difficulty maintaining erections.  We discussed other forms of pharmacotherapy with trialing Cialis 5 mg daily versus Levitra as needed.  Additionally, we discussed cavernosal injection therapy with Trimix.  Patient will undergo total testosterone testing to determine if this is also contributing to his erectile dysfunction.  Our office will call with testosterone results.  At this time, the patient would like to hold off on pursuing treatment for his erectile dysfunction until he is evaluated for the potential Peyronie's disease

## 2025-06-03 NOTE — PROGRESS NOTES
6/3/2025      Assessment and Plan    55 y.o. male new patient to [facility] for urology    Peyronie's disease  We discussed that I suspect the patient is suffering from Peyronie's disease with his history of spontaneous curvature  We discussed that a referral to my partner, Dr. Alonso, specializes in the treatment of Peyronie's disease.  We discussed that at their initial evaluation an erection would be spontaneously produced utilizing intracavernosal injection therapy with Trimix for the degree of curvature to be evaluated in the office.  We discussed that the medication cost $65 and that he is required to bring it to the evaluation appointment.  Patient would like to pursue therapy for his Peyronie's disease prior to pursuing erectile dysfunction medication.  We discussed that I will fax over Trimix to [facility] and that he was instructed to pick it up either a few days prior or the day of his office visit and to bring it with him to the appointment.    Erectile dysfunction  Patient previously trialed Viagra 100 mg and Cialis 20 mg as needed  We discussed that erectile dysfunction is multifactorial and that high blood pressure, high sugar levels, and high cholesterol can all contribute.  Additionally, we discussed that testosterone levels can affect the quality of our erections.  Patient does endorse fatigue, brain fog, inability to keep weight off, and difficulty maintaining erections.  We discussed other forms of pharmacotherapy with trialing Cialis 5 mg daily versus Levitra as needed.  Additionally, we discussed cavernosal injection therapy with Trimix.  Patient will undergo total testosterone testing to determine if this is also contributing to his erectile dysfunction.  Our office will call with testosterone results.  At this time, the patient would like to hold off on pursuing treatment for his erectile dysfunction until he is evaluated for the potential Peyronie's disease    Screening  for prostate cancer  Patient denies a known family history of for prostate cancer  Patient's last PSA was performed 3/15/2024 and found to be 1.01.  We discussed that the patient is due for repeat PSA testing.  New PSA order placed in the patient's chart and our office will call with results.        History of Present Illness  Angelo Perry is a 55 y.o. male here for evaluation of Peyronie's disease and prostate cancer screening.  Patient has a significant past medical history for hypertension, IBS, dyslipidemia, and spinal stenosis.    Today, the patient reports that about a month ago he noticed that his penis when erect would start to curve upward into the left.  Patient notes that when he has an erection and it does this does cause mild discomfort.  Patient notes that he has utilized both Cialis 20 mg and Viagra 100 mg as needed for treatment of erectile dysfunction.  Patient notes that these medications do help him produce an erection, but it is not as long-lasting as he would desire.  Patient notes that it is difficult for him to obtain an erection without these medications.  Patient would like to pursue therapy for his ED, but would like to have the curvature of his penis evaluated and treated prior to pursuing any further erectile dysfunction therapy.  Patient notes that he cannot recall any memorable event or trauma that would have contributed to the curvature.  Patient notes that developed around the time that his psoriasis returned.    Otherwise, the patient offers no other lower urinary tract complaints today's office visit.  Patient is unbothered by his frequency or urgency and reports a moderate urinary stream.    Patient denies any known family history for prostate cancer.  Patient's last PSA was performed 3/15/2024 and found to be 1.01.    Review of Systems   Constitutional:  Negative for chills and fever.   HENT:  Negative for ear pain and sore throat.    Eyes:  Negative for pain and visual  "disturbance.   Respiratory:  Negative for cough and shortness of breath.    Cardiovascular:  Negative for chest pain and palpitations.   Gastrointestinal:  Negative for abdominal pain and vomiting.   Genitourinary:  Negative for decreased urine volume, difficulty urinating, dysuria, flank pain, frequency, hematuria and urgency.   Musculoskeletal:  Negative for arthralgias and back pain.   Skin:  Negative for color change and rash.   Neurological:  Negative for seizures and syncope.   All other systems reviewed and are negative.          AUA SYMPTOM SCORE      Flowsheet Row Most Recent Value   AUA SYMPTOM SCORE    How often have you had a sensation of not emptying your bladder completely after you finished urinating? 0 (P)     How often have you had to urinate again less than two hours after you finished urinating? 0 (P)     How often have you found you stopped and started again several times when you urinate? 0 (P)     How often have you found it difficult to postpone urination? 0 (P)     How often have you had a weak urinary stream? 0 (P)     How often have you had to push or strain to begin urination? 0 (P)     How many times did you most typically get up to urinate from the time you went to bed at night until the time you got up in the morning? 0 (P)     Quality of Life: If you were to spend the rest of your life with your urinary condition just the way it is now, how would you feel about that? 4 (P)     AUA SYMPTOM SCORE 0 (P)               Vitals  Vitals:    06/03/25 1507   BP: 130/80   BP Location: Left arm   Patient Position: Sitting   Cuff Size: Adult   Pulse: 78   SpO2: 97%   Weight: 90.7 kg (200 lb)   Height: 5' 10\" (1.778 m)       Physical Exam  Vitals reviewed.   Constitutional:       General: He is not in acute distress.     Appearance: Normal appearance. He is not ill-appearing.   HENT:      Head: Normocephalic and atraumatic.      Nose: Nose normal.     Eyes:      General: No scleral " "icterus.    Pulmonary:      Effort: No respiratory distress.   Abdominal:      General: Abdomen is flat. There is no distension.      Palpations: Abdomen is soft.      Tenderness: There is no abdominal tenderness.     Musculoskeletal:         General: Normal range of motion.      Cervical back: Normal range of motion.     Skin:     General: Skin is warm.      Coloration: Skin is not jaundiced.     Neurological:      Mental Status: He is alert and oriented to person, place, and time.      Gait: Gait normal.     Psychiatric:         Mood and Affect: Mood normal.         Behavior: Behavior normal.           Past History  Past Medical History[1]  Social History[2]  Tobacco Use History[3]  Family History[4]    The following portions of the patient's history were reviewed and updated as appropriate: allergies, current medications, past medical history, past social history, past surgical history and problem list.    Results  No results found for this or any previous visit (from the past hour).]  Lab Results   Component Value Date    PSA 1.01 03/15/2024     Lab Results   Component Value Date    CALCIUM 9.0 03/15/2024    K 3.6 03/15/2024    CO2 27 03/15/2024     03/15/2024    BUN 14 03/15/2024    CREATININE 0.94 03/15/2024     No results found for: \"WBC\", \"HGB\", \"HCT\", \"MCV\", \"PLT\"          [1]   Past Medical History:  Diagnosis Date    Allergic     Arthritis     Constipation     Hernia CT July    Irritable bowel syndrome     On file   [2]   Social History  Socioeconomic History    Marital status: /Civil Union   Tobacco Use    Smoking status: Some Days     Types: Cigars    Smokeless tobacco: Never    Tobacco comments:     cigars daily   Vaping Use    Vaping status: Never Used   Substance and Sexual Activity    Alcohol use: Yes     Alcohol/week: 12.0 standard drinks of alcohol     Types: 12 Cans of beer per week     Comment: socially    Drug use: Never    Sexual activity: Yes     Partners: Female     Birth " control/protection: Male Sterilization, None   [3]   Social History  Tobacco Use   Smoking Status Some Days    Types: Cigars   Smokeless Tobacco Never   Tobacco Comments    cigars daily   [4]   Family History  Problem Relation Name Age of Onset    Pancreatic cancer Mother Michelle Bennett     Hypertension Mother Michelle Bennett     Diabetes Mother Michelle Bennett     COPD Mother Michelle Bennett     Arthritis Mother Michelle Bennett     Cancer Mother Michelle Bennett     Hepatitis Mother Michelle Bennett

## 2025-07-03 PROBLEM — Z12.5 SCREENING FOR PROSTATE CANCER: Status: RESOLVED | Noted: 2025-06-03 | Resolved: 2025-07-03

## 2025-07-31 LAB
PSA SERPL-MCNC: 1.02 NG/ML
TESTOST SERPL-MCNC: 242 NG/DL (ref 150–684)

## 2025-08-01 ENCOUNTER — TELEPHONE (OUTPATIENT)
Age: 55
End: 2025-08-01

## 2025-08-05 ENCOUNTER — OFFICE VISIT (OUTPATIENT)
Dept: UROLOGY | Facility: CLINIC | Age: 55
End: 2025-08-05
Payer: COMMERCIAL

## 2025-08-05 VITALS
WEIGHT: 195 LBS | DIASTOLIC BLOOD PRESSURE: 76 MMHG | HEIGHT: 70 IN | OXYGEN SATURATION: 98 % | BODY MASS INDEX: 27.92 KG/M2 | SYSTOLIC BLOOD PRESSURE: 124 MMHG | HEART RATE: 58 BPM

## 2025-08-05 DIAGNOSIS — N48.6 PEYRONIE'S DISEASE: Primary | ICD-10-CM

## 2025-08-05 PROCEDURE — 54235 NJX CORPORA CAVERNOSA RX AGT: CPT | Performed by: UROLOGY

## 2025-08-05 PROCEDURE — 99215 OFFICE O/P EST HI 40 MIN: CPT | Performed by: UROLOGY

## 2025-08-06 ENCOUNTER — TELEPHONE (OUTPATIENT)
Dept: UROLOGY | Facility: CLINIC | Age: 55
End: 2025-08-06

## 2025-08-11 ENCOUNTER — OFFICE VISIT (OUTPATIENT)
Dept: PODIATRY | Facility: CLINIC | Age: 55
End: 2025-08-11
Payer: COMMERCIAL

## 2025-08-11 PROBLEM — B35.3 TINEA PEDIS OF BOTH FEET: Status: ACTIVE | Noted: 2025-08-11

## 2025-08-11 PROBLEM — L08.89 PITTED KERATOLYSIS: Status: ACTIVE | Noted: 2025-08-11
